# Patient Record
Sex: FEMALE | Race: WHITE | NOT HISPANIC OR LATINO | Employment: OTHER | URBAN - METROPOLITAN AREA
[De-identification: names, ages, dates, MRNs, and addresses within clinical notes are randomized per-mention and may not be internally consistent; named-entity substitution may affect disease eponyms.]

---

## 2017-03-03 ENCOUNTER — OFFICE VISIT (OUTPATIENT)
Dept: INTERNAL MEDICINE | Facility: CLINIC | Age: 62
End: 2017-03-03
Payer: COMMERCIAL

## 2017-03-03 VITALS
BODY MASS INDEX: 36.06 KG/M2 | OXYGEN SATURATION: 99 % | SYSTOLIC BLOOD PRESSURE: 131 MMHG | HEART RATE: 90 BPM | HEIGHT: 68 IN | DIASTOLIC BLOOD PRESSURE: 84 MMHG | RESPIRATION RATE: 16 BRPM | WEIGHT: 237.9 LBS | TEMPERATURE: 97.7 F

## 2017-03-03 DIAGNOSIS — I10 ESSENTIAL HYPERTENSION, BENIGN: ICD-10-CM

## 2017-03-03 PROCEDURE — 99213 OFFICE O/P EST LOW 20 MIN: CPT | Performed by: INTERNAL MEDICINE

## 2017-03-03 RX ORDER — LISINOPRIL AND HYDROCHLOROTHIAZIDE 12.5; 2 MG/1; MG/1
1 TABLET ORAL DAILY
Qty: 90 TABLET | Refills: 1 | Status: SHIPPED | OUTPATIENT
Start: 2017-03-03 | End: 2017-08-27

## 2017-03-03 NOTE — MR AVS SNAPSHOT
"              After Visit Summary   3/3/2017    Heather Bauer    MRN: [id number]           Patient Information     Date Of Birth          1955        Visit Information        Provider Department      3/3/2017 1:20 PM Sylvia Bird MD [facility]        Today's Diagnoses     Essential hypertension, benign           Follow-ups after your visit        Who to contact     If you have questions or need follow up information about today's clinic visit or your schedule please contact [facility] directly at [phone].  Normal or non-critical lab and imaging results will be communicated to you by MyChart, letter or phone within 4 business days after the clinic has received the results. If you do not hear from us within 7 days, please contact the clinic through Deemhart or phone. If you have a critical or abnormal lab result, we will notify you by phone as soon as possible.  Submit refill requests through Ziftit or call your pharmacy and they will forward the refill request to us. Please allow 3 business days for your refill to be completed.          Additional Information About Your Visit        MyChart Information     Ziftit gives you secure access to your electronic health record. If you see a primary care provider, you can also send messages to your care team and make appointments. If you have questions, please call your primary care clinic.  If you do not have a primary care provider, please call [phone] and they will assist you.        Care EveryWhere ID     This is your Care EveryWhere ID. This could be used by other organizations to access your [facility] medical records  [id number]        Your Vitals Were     Pulse Temperature Respirations Height Last Period Pulse Oximetry    90 97.7  F (36.5  C) (Oral) 16 5' 7.75\" (1.721 m) 07/07/2008 99%    BMI (Body Mass Index)                   36.44 kg/m2            Blood Pressure from Last 3 Encounters:   03/03/17 131/84 "   07/11/16 110/80   12/14/15 132/88    Weight from Last 3 Encounters:   03/03/17 237 lb 14.4 oz (107.9 kg)   07/11/16 236 lb (107 kg)   12/14/15 242 lb (109.8 kg)              Today, you had the following     No orders found for display         Where to get your medicines      These medications were sent to [facility] - [place], [place] - [address]  [address] [facility] [address]     Phone:  [phone]     lisinopril-hydrochlorothiazide 20-12.5 MG per tablet          Primary Care Provider Office Phone # Fax #    Sylvia Bird [phone]       [facility] [address] [facility] [address]  [facility] [address]        Thank you!     Thank you for choosing [facility]  for your care. Our goal is always to provide you with excellent care. Hearing back from our patients is one way we can continue to improve our services. Please take a few minutes to complete the written survey that you may receive in the mail after your visit with us. Thank you!             Your Updated Medication List - Protect others around you: Learn how to safely use, store and throw away your medicines at www.disposemymeds.org.          This list is accurate as of: 3/3/17  3:32 PM.  Always use your most recent med list.                   Brand Name Dispense Instructions for use    CALCIUM-VITAMIN D3 PO      Take 2 tablets by mouth as needed       lisinopril-hydrochlorothiazide 20-12.5 MG per tablet    PRINZIDE/ZESTORETIC    90 tablet    Take 1 tablet by mouth daily       Multiple Minerals-Vitamins Tabs

## 2017-03-03 NOTE — NURSING NOTE
"Chief Complaint   Patient presents with     Hypertension     LOV 07/11/2016       Initial /84  Pulse 90  Temp 97.7  F (36.5  C) (Oral)  Resp 16  Ht 5' 7.75\" (1.721 m)  Wt 237 lb 14.4 oz (107.9 kg)  LMP 07/07/2008  SpO2 99%  BMI 36.44 kg/m2 Estimated body mass index is 36.44 kg/(m^2) as calculated from the following:    Height as of this encounter: 5' 7.75\" (1.721 m).    Weight as of this encounter: 237 lb 14.4 oz (107.9 kg).  Medication Reconciliation: complete      Tete Lopez CMA    Pt received HCD and will return when complete.          "

## 2017-03-03 NOTE — PROGRESS NOTES
"  SUBJECTIVE:                                                    Heather Bauer is a 62 year old female who presents to clinic today for the following health issues:      Hypertension Follow-up      Outpatient blood pressures are not being checked.    Low Salt Diet: low salt       Amount of exercise or physical activity: None    Problems taking medications regularly: Yes,  problems remembering to take    Medication side effects: none  Diet: low salt             Current Concerns: none    Patient Active Problem List   Diagnosis     Essential hypertension, benign     CARDIOVASCULAR SCREENING; LDL GOAL LESS THAN 160     HCD (health care directive)     Pap smear of cervix with ASCUS, cannot exclude HGSIL       Current Outpatient Prescriptions   Medication Sig Dispense Refill     lisinopril-hydrochlorothiazide (PRINZIDE,ZESTORETIC) 20-12.5 MG per tablet Take 1 tablet by mouth daily 90 tablet 3     Calcium Carbonate-Vitamin D (CALCIUM-VITAMIN D3 PO) Take 2 tablets by mouth as needed        MULTIPLE MINERALS-VITAMINS OR TABS            Social History   Substance Use Topics     Smoking status: Former Smoker     Smokeless tobacco: Never Used      Comment: quit 20 years ago     Alcohol use Yes      Comment: very little        ROS:  No fever, chills, no dyspnea on exertion, no chest pain, palpitations, PND, orthopnea, edema, syncope, headache, abdominal pain     OBJECTIVE:                                                    /84  Pulse 90  Temp 97.7  F (36.5  C) (Oral)  Resp 16  Ht 5' 7.75\" (1.721 m)  Wt 237 lb 14.4 oz (107.9 kg)  LMP 07/07/2008  SpO2 99%  BMI 36.44 kg/m2  Body mass index is 36.44 kg/(m^2).      Lungs: clear  CV: normal S1, S2 without murmur, S3 or S4.   Abdomen: Bowel sounds normal, soft, nontender. No hepatosplenomegaly. No masses.   No edema  Pulses full, no carotid bruits       ASSESSMENT/PLAN:                                                            1. Essential hypertension, benign  well " controlled, continue med, follow up in 6 months with physical, labs then.   - lisinopril-hydrochlorothiazide (PRINZIDE/ZESTORETIC) 20-12.5 MG per tablet; Take 1 tablet by mouth daily  Dispense: 90 tablet; Refill: 1        Sylvia Bird MD  [facility]

## 2017-08-27 DIAGNOSIS — I10 ESSENTIAL HYPERTENSION, BENIGN: ICD-10-CM

## 2017-08-28 RX ORDER — LISINOPRIL AND HYDROCHLOROTHIAZIDE 12.5; 2 MG/1; MG/1
TABLET ORAL
Qty: 30 TABLET | Refills: 0 | Status: SHIPPED | OUTPATIENT
Start: 2017-08-28 | End: 2017-10-21

## 2017-08-28 NOTE — TELEPHONE ENCOUNTER
Lisinopril-Hydrochlorothiazide      Last Written Prescription Date: 03/03/17  Last Fill Quantity: 90, # refills: 1  Last Office Visit with G, P or [facility] prescribing provider: 03/03/17       Potassium   Date Value Ref Range Status   07/11/2016 3.9 3.4 - 5.3 mmol/L Final     Creatinine   Date Value Ref Range Status   07/11/2016 0.85 0.52 - 1.04 mg/dL Final     BP Readings from Last 3 Encounters:   03/03/17 131/84   07/11/16 110/80   12/14/15 132/88

## 2017-10-21 DIAGNOSIS — I10 ESSENTIAL HYPERTENSION, BENIGN: ICD-10-CM

## 2017-10-24 RX ORDER — LISINOPRIL AND HYDROCHLOROTHIAZIDE 12.5; 2 MG/1; MG/1
TABLET ORAL
Qty: 30 TABLET | Refills: 0 | Status: SHIPPED | OUTPATIENT
Start: 2017-10-24 | End: 2017-11-30

## 2017-10-24 NOTE — TELEPHONE ENCOUNTER
Pt was sent my chart message that her 11/3 appt is being cancelled, due to provider being out of the office

## 2017-11-30 ENCOUNTER — OFFICE VISIT (OUTPATIENT)
Dept: INTERNAL MEDICINE | Facility: CLINIC | Age: 62
End: 2017-11-30
Payer: COMMERCIAL

## 2017-11-30 VITALS
DIASTOLIC BLOOD PRESSURE: 82 MMHG | OXYGEN SATURATION: 99 % | RESPIRATION RATE: 16 BRPM | WEIGHT: 239.5 LBS | TEMPERATURE: 98.4 F | HEART RATE: 74 BPM | BODY MASS INDEX: 36.69 KG/M2 | SYSTOLIC BLOOD PRESSURE: 118 MMHG

## 2017-11-30 DIAGNOSIS — L30.9 ECZEMA, UNSPECIFIED TYPE: ICD-10-CM

## 2017-11-30 DIAGNOSIS — Z23 NEED FOR PROPHYLACTIC VACCINATION AND INOCULATION AGAINST INFLUENZA: ICD-10-CM

## 2017-11-30 DIAGNOSIS — Z13.6 CARDIOVASCULAR SCREENING; LDL GOAL LESS THAN 130: ICD-10-CM

## 2017-11-30 DIAGNOSIS — I10 ESSENTIAL HYPERTENSION, BENIGN: Primary | ICD-10-CM

## 2017-11-30 DIAGNOSIS — Z11.59 SCREENING FOR VIRAL DISEASE: ICD-10-CM

## 2017-11-30 PROCEDURE — 99214 OFFICE O/P EST MOD 30 MIN: CPT | Mod: 25 | Performed by: INTERNAL MEDICINE

## 2017-11-30 PROCEDURE — 90471 IMMUNIZATION ADMIN: CPT | Performed by: INTERNAL MEDICINE

## 2017-11-30 PROCEDURE — 90686 IIV4 VACC NO PRSV 0.5 ML IM: CPT | Performed by: INTERNAL MEDICINE

## 2017-11-30 PROCEDURE — 82043 UR ALBUMIN QUANTITATIVE: CPT | Performed by: INTERNAL MEDICINE

## 2017-11-30 RX ORDER — FLUOCINONIDE 0.5 MG/G
CREAM TOPICAL
Qty: 30 G | Refills: 1 | Status: SHIPPED | OUTPATIENT
Start: 2017-11-30 | End: 2018-04-10

## 2017-11-30 RX ORDER — LISINOPRIL AND HYDROCHLOROTHIAZIDE 12.5; 2 MG/1; MG/1
1 TABLET ORAL DAILY
Qty: 90 TABLET | Refills: 3 | Status: SHIPPED | OUTPATIENT
Start: 2017-11-30 | End: 2018-10-21

## 2017-11-30 NOTE — NURSING NOTE
"Chief Complaint   Patient presents with     Hypertension     LOV 03/03/2017:      Derm Problem     spot on left inner ankle- have noticed for a few years. Sometimes it can get sore.      Imm/Inj       Initial /82  Pulse 74  Temp 98.4  F (36.9  C) (Oral)  Resp 16  Wt 239 lb 8 oz (108.6 kg)  LMP 07/07/2008  SpO2 99%  BMI 36.69 kg/m2 Estimated body mass index is 36.69 kg/(m^2) as calculated from the following:    Height as of 3/3/17: 5' 7.75\" (1.721 m).    Weight as of this encounter: 239 lb 8 oz (108.6 kg).  Medication Reconciliation: complete      Tete Lopez CMA      "

## 2017-11-30 NOTE — MR AVS SNAPSHOT
After Visit Summary   11/30/2017    Heather Bauer    MRN: [id number]           Patient Information     Date Of Birth          1955        Visit Information        Provider Department      11/30/2017 5:00 PM Sylvia Bird MD [facility]        Today's Diagnoses     Essential hypertension, benign    -  1    Eczema, unspecified type        CARDIOVASCULAR SCREENING; LDL GOAL LESS THAN 130        Screening for viral disease        Need for prophylactic vaccination and inoculation against influenza           Follow-ups after your visit        Your next 10 appointments already scheduled     Dec 09, 2017  9:15 AM CST   LAB with [facility]   [facility]    [address]  [facility] [address]   [phone]           Please do not eat 10-12 hours before your appointment if you are coming in fasting for labs on lipids, cholesterol, or glucose (sugar). This does not apply to pregnant women. Water, hot tea and black coffee (with nothing added) are okay. Do not drink other fluids, diet soda or chew gum.              Who to contact     If you have questions or need follow up information about today's clinic visit or your schedule please contact [facility] directly at [phone].  Normal or non-critical lab and imaging results will be communicated to you by MyChart, letter or phone within 4 business days after the clinic has received the results. If you do not hear from us within 7 days, please contact the clinic through Funderahart or phone. If you have a critical or abnormal lab result, we will notify you by phone as soon as possible.  Submit refill requests through AwarenessHub or call your pharmacy and they will forward the refill request to us. Please allow 3 business days for your refill to be completed.          Additional Information About Your Visit        MyChart Information     AwarenessHub gives you secure access to your  electronic health record. If you see a primary care provider, you can also send messages to your care team and make appointments. If you have questions, please call your primary care clinic.  If you do not have a primary care provider, please call [phone] and they will assist you.        Care EveryWhere ID     This is your Care EveryWhere ID. This could be used by other organizations to access your [facility] medical records  [id number]        Your Vitals Were     Pulse Temperature Respirations Last Period Pulse Oximetry BMI (Body Mass Index)    74 98.4  F (36.9  C) (Oral) 16 07/07/2008 99% 36.69 kg/m2       Blood Pressure from Last 3 Encounters:   11/30/17 118/82   03/03/17 131/84   07/11/16 110/80    Weight from Last 3 Encounters:   11/30/17 239 lb 8 oz (108.6 kg)   03/03/17 237 lb 14.4 oz (107.9 kg)   07/11/16 236 lb (107 kg)              We Performed the Following     Albumin Random Urine Quantitative with Creat Ratio     FLU VAC, SPLIT VIRUS IM > 3 YO (QUADRIVALENT) [96591]     Vaccine Administration, Initial [43135]          Today's Medication Changes          These changes are accurate as of: 11/30/17 11:59 PM.  If you have any questions, ask your nurse or doctor.               Start taking these medicines.        Dose/Directions    fluocinonide 0.05 % cream   Commonly known as:  LIDEX   Used for:  Eczema, unspecified type   Started by:  Sylvia Bird MD        Apply sparingly to affected area daily   Quantity:  30 g   Refills:  1         These medicines have changed or have updated prescriptions.        Dose/Directions    lisinopril-hydrochlorothiazide 20-12.5 MG per tablet   Commonly known as:  PRINZIDE/ZESTORETIC   This may have changed:  See the new instructions.   Used for:  Essential hypertension, benign   Changed by:  Sylvia Bird MD        Dose:  1 tablet   Take 1 tablet by mouth daily   Quantity:  90 tablet   Refills:  3            Where to get your medicines      These medications were sent to  [facility] - [place], [place] - [address]  [address], [facility] [address]     Phone:  [phone]     fluocinonide 0.05 % cream    lisinopril-hydrochlorothiazide 20-12.5 MG per tablet                Primary Care Provider Office Phone # Fax #    Sylvia Bird [phone]       [address] [facility] [address]  [facility] [address]        Equal Access to Services     MARK KHAN : Hadii aad ku hadasho Soomaali, waaxda luqadaha, qaybta kaalmada adeegyada, waxay idiin hayaan adeeg kharash laemily . So [facility] [phone].    ATENCIÓN: Si habla español, tiene a torres disposición servicios gratuitos de asistencia lingüística. [facility] [phone].    We comply with applicable federal civil rights laws and [place] laws. We do not discriminate on the basis of race, color, national origin, age, disability, sex, sexual orientation, or gender identity.            Thank you!     Thank you for choosing [facility]  for your care. Our goal is always to provide you with excellent care. Hearing back from our patients is one way we can continue to improve our services. Please take a few minutes to complete the written survey that you may receive in the mail after your visit with us. Thank you!             Your Updated Medication List - Protect others around you: Learn how to safely use, store and throw away your medicines at www.disposemymeds.org.          This list is accurate as of: 11/30/17 11:59 PM.  Always use your most recent med list.                   Brand Name Dispense Instructions for use Diagnosis    CALCIUM-VITAMIN D3 PO      Take 2 tablets by mouth as needed        fluocinonide 0.05 % cream    LIDEX    30 g    Apply sparingly to affected area daily    Eczema, unspecified type       lisinopril-hydrochlorothiazide 20-12.5 MG per tablet    PRINZIDE/ZESTORETIC    90 tablet    Take 1 tablet by mouth daily    Essential hypertension, benign       Multiple Minerals-Vitamins Tabs

## 2017-11-30 NOTE — PROGRESS NOTES
SUBJECTIVE:   Heather Bauer is a 62 year old female who presents to clinic today for the following health issues:      Hypertension Follow-up      Outpatient blood pressures are not being checked.    Low Salt Diet: sometimes- no added salt         Amount of exercise or physical activity: None    Problems taking medications regularly: forgetting     Medication side effects: none    Diet: low salt      Current Concerns:   She has a skin rash at left lower leg medially. This has been present several years. It can seem to almost go away then worsens. It can be itchy, sometimes sore. She has used lotion, tried fungal cream in distant pas. She has had some lidex but did not try it on this area.       Patient Active Problem List   Diagnosis     Essential hypertension, benign     HCD (health care directive)     CARDIOVASCULAR SCREENING; LDL GOAL LESS THAN 130       Current Outpatient Prescriptions   Medication Sig Dispense Refill     lisinopril-hydrochlorothiazide (PRINZIDE/ZESTORETIC) 20-12.5 MG per tablet TAKE 1 TABLET BY MOUTH DAILY 30 tablet 0     Calcium Carbonate-Vitamin D (CALCIUM-VITAMIN D3 PO) Take 2 tablets by mouth as needed        MULTIPLE MINERALS-VITAMINS OR TABS            Social History   Substance Use Topics     Smoking status: Former Smoker     Smokeless tobacco: Never Used      Comment: quit 20 years ago     Alcohol use Yes      Comment: very little          Reviewed and updated as needed this visit by clinical staff     Reviewed and updated as needed this visit by Provider         ROS:  No fever, chills, no dyspnea on exertion, no chest pain, palpitations, PND, orthopnea, edema, syncope, headache, abdominal pain     OBJECTIVE:     /82  Pulse 74  Temp 98.4  F (36.9  C) (Oral)  Resp 16  Wt 239 lb 8 oz (108.6 kg)  LMP 07/07/2008  SpO2 99%  BMI 36.69 kg/m2  Body mass index is 36.69 kg/(m^2).    Lungs: clear  CV: normal S1, S2 without murmur, S3 or S4.   Abdomen: Bowel sounds normal, soft,  nontender. No hepatosplenomegaly. No masses.   No edema  Pulses full, no carotid bruits      Skin: 2 cm patch with raised edges, mild scaling on left lower leg      ASSESSMENT/PLAN:             1. Essential hypertension, benign  Well controlled, continue med, labs due  - Albumin Random Urine Quantitative with Creat Ratio; Future  - lisinopril-hydrochlorothiazide (PRINZIDE/ZESTORETIC) 20-12.5 MG per tablet; Take 1 tablet by mouth daily  Dispense: 90 tablet; Refill: 3  - Albumin Random Urine Quantitative with Creat Ratio  - Basic metabolic panel; Future    2. Eczema, unspecified type  Likely to be some eczematous dermatitis since it did not respond to antifungal. She can try the lidex on this patch, if worse then try lotrimin, consider derm in future.   - fluocinonide (LIDEX) 0.05 % cream; Apply sparingly to affected area daily  Dispense: 30 g; Refill: 1    3. CARDIOVASCULAR SCREENING; LDL GOAL LESS THAN 130  recheck  - Lipid panel reflex to direct LDL Fasting; Future    4. Screening for viral disease  Hep C    5. Need for prophylactic vaccination and inoculation against influenza    - FLU VAC, SPLIT VIRUS IM > 3 YO (QUADRIVALENT) [62623]  - Vaccine Administration, Initial [38078]        Sylvia Bird MD  [facility]    Injectable Influenza Immunization Documentation    1.  Is the person to be vaccinated sick today?   No    2. Does the person to be vaccinated have an allergy to a component   of the vaccine?   No  Egg Allergy Algorithm Link    3. Has the person to be vaccinated ever had a serious reaction   to influenza vaccine in the past?   No    4. Has the person to be vaccinated ever had Guillain-Barré syndrome?   No    Form completed by  Tete Lopez [facility]

## 2017-12-01 LAB
CREAT UR-MCNC: 154 MG/DL
MICROALBUMIN UR-MCNC: 11 MG/L
MICROALBUMIN/CREAT UR: 6.88 MG/G CR (ref 0–25)

## 2017-12-04 ENCOUNTER — TELEPHONE (OUTPATIENT)
Dept: INTERNAL MEDICINE | Facility: CLINIC | Age: 62
End: 2017-12-04

## 2017-12-04 NOTE — TELEPHONE ENCOUNTER
Left message on pt's mobile number to call back. Need to know if pt received flu vaccine at her office visit with Dr Bird on 11/30/2017?

## 2017-12-09 DIAGNOSIS — I10 ESSENTIAL HYPERTENSION, BENIGN: ICD-10-CM

## 2017-12-09 DIAGNOSIS — Z13.6 CARDIOVASCULAR SCREENING; LDL GOAL LESS THAN 130: ICD-10-CM

## 2017-12-09 DIAGNOSIS — Z11.59 SCREENING FOR VIRAL DISEASE: ICD-10-CM

## 2017-12-09 LAB
ANION GAP SERPL CALCULATED.3IONS-SCNC: 5 MMOL/L (ref 3–14)
BUN SERPL-MCNC: 21 MG/DL (ref 7–30)
CALCIUM SERPL-MCNC: 9.2 MG/DL (ref 8.5–10.1)
CHLORIDE SERPL-SCNC: 105 MMOL/L (ref 94–109)
CHOLEST SERPL-MCNC: 191 MG/DL
CO2 SERPL-SCNC: 29 MMOL/L (ref 20–32)
CREAT SERPL-MCNC: 0.92 MG/DL (ref 0.52–1.04)
GFR SERPL CREATININE-BSD FRML MDRD: 62 ML/MIN/1.7M2
GLUCOSE SERPL-MCNC: 79 MG/DL (ref 70–99)
HDLC SERPL-MCNC: 77 MG/DL
LDLC SERPL CALC-MCNC: 103 MG/DL
NONHDLC SERPL-MCNC: 114 MG/DL
POTASSIUM SERPL-SCNC: 4.4 MMOL/L (ref 3.4–5.3)
SODIUM SERPL-SCNC: 139 MMOL/L (ref 133–144)
TRIGL SERPL-MCNC: 57 MG/DL

## 2017-12-09 PROCEDURE — 36415 COLL VENOUS BLD VENIPUNCTURE: CPT | Performed by: INTERNAL MEDICINE

## 2017-12-09 PROCEDURE — 80061 LIPID PANEL: CPT | Performed by: INTERNAL MEDICINE

## 2017-12-09 PROCEDURE — 86803 HEPATITIS C AB TEST: CPT | Performed by: INTERNAL MEDICINE

## 2017-12-09 PROCEDURE — 80048 BASIC METABOLIC PNL TOTAL CA: CPT | Performed by: INTERNAL MEDICINE

## 2017-12-11 LAB — HCV AB SERPL QL IA: NONREACTIVE

## 2018-04-10 ENCOUNTER — DOCUMENTATION ONLY (OUTPATIENT)
Dept: LAB | Facility: CLINIC | Age: 63
End: 2018-04-10

## 2018-04-10 ENCOUNTER — OFFICE VISIT (OUTPATIENT)
Dept: INTERNAL MEDICINE | Facility: CLINIC | Age: 63
End: 2018-04-10
Payer: COMMERCIAL

## 2018-04-10 VITALS
OXYGEN SATURATION: 100 % | RESPIRATION RATE: 16 BRPM | HEART RATE: 90 BPM | DIASTOLIC BLOOD PRESSURE: 62 MMHG | HEIGHT: 68 IN | SYSTOLIC BLOOD PRESSURE: 118 MMHG | TEMPERATURE: 98.1 F | BODY MASS INDEX: 35.89 KG/M2 | WEIGHT: 236.8 LBS

## 2018-04-10 DIAGNOSIS — I10 ESSENTIAL HYPERTENSION, BENIGN: ICD-10-CM

## 2018-04-10 DIAGNOSIS — N93.9 VAGINAL BLEEDING: ICD-10-CM

## 2018-04-10 DIAGNOSIS — E66.01 MORBID OBESITY (H): ICD-10-CM

## 2018-04-10 DIAGNOSIS — Z00.00 ENCOUNTER FOR ROUTINE ADULT HEALTH EXAMINATION WITHOUT ABNORMAL FINDINGS: Primary | ICD-10-CM

## 2018-04-10 PROCEDURE — 99396 PREV VISIT EST AGE 40-64: CPT | Performed by: INTERNAL MEDICINE

## 2018-04-10 NOTE — MR AVS SNAPSHOT
After Visit Summary   4/10/2018    Heather Bauer    MRN: [id number]           Patient Information     Date Of Birth          1955        Visit Information        Provider Department      4/10/2018 4:00 PM Sylvia Bird MD [facility]        Care Instructions      Preventive Health Recommendations  Female Ages 50 - 64    Yearly exam: See your health care provider every year in order to  o Review health changes.   o Discuss preventive care.    o Review your medicines if your doctor has prescribed any.      Get a Pap test every three years (unless you have an abnormal result and your provider advises testing more often).    If you get Pap tests with HPV test, you only need to test every 5 years, unless you have an abnormal result.     You do not need a Pap test if your uterus was removed (hysterectomy) and you have not had cancer.    You should be tested each year for STDs (sexually transmitted diseases) if you're at risk.     Have a mammogram every 1 to 2 years.    Have a colonoscopy at age 50, or have a yearly FIT test (stool test). These exams screen for colon cancer.      Have a cholesterol test every 5 years, or more often if advised.    Have a diabetes test (fasting glucose) every three years. If you are at risk for diabetes, you should have this test more often.     If you are at risk for osteoporosis (brittle bone disease), think about having a bone density scan (DEXA).    Shots: Get a flu shot each year. Get a tetanus shot every 10 years.    Nutrition:     Eat at least 5 servings of fruits and vegetables each day.    Eat whole-grain bread, whole-wheat pasta and brown rice instead of white grains and rice.    Talk to your provider about Calcium and Vitamin D.     Lifestyle    Exercise at least 150 minutes a week (30 minutes a day, 5 days a week). This will help you control your weight and prevent disease.    Limit alcohol to one drink per day.    No smoking.     Wear  sunscreen to prevent skin cancer.     See your dentist every six months for an exam and cleaning.    See your eye doctor every 1 to 2 years.            Follow-ups after your visit        Your next 10 appointments already scheduled     Apr 21, 2018  9:45 AM CDT   LAB with [facility]   [facility]    [address]  [facility] [address]   [phone]           Please do not eat 10-12 hours before your appointment if you are coming in fasting for labs on lipids, cholesterol, or glucose (sugar). This does not apply to pregnant women. Water, hot tea and black coffee (with nothing added) are okay. Do not drink other fluids, diet soda or chew gum.              Who to contact     If you have questions or need follow up information about today's clinic visit or your schedule please contact [facility] directly at [phone].  Normal or non-critical lab and imaging results will be communicated to you by [employer], letter or phone within 4 business days after the clinic has received the results. If you do not hear from us within 7 days, please contact the clinic through [employer] or phone. If you have a critical or abnormal lab result, we will notify you by phone as soon as possible.  Submit refill requests through [employer] or call your pharmacy and they will forward the refill request to us. Please allow 3 business days for your refill to be completed.          Additional Information About Your Visit        [employer] Information     [employer] gives you secure access to your electronic health record. If you see a primary care provider, you can also send messages to your care team and make appointments. If you have questions, please call your primary care clinic.  If you do not have a primary care provider, please call [phone] and they will assist you.        Care EveryWhere ID     This is your Care EveryWhere ID. This could be used by other organizations to  "access your [facility] medical records  [id number]        Your Vitals Were     Pulse Temperature Respirations Height Last Period Pulse Oximetry    90 98.1  F (36.7  C) (Oral) 16 5' 7.5\" (1.715 m) 07/07/2008 100%    BMI (Body Mass Index)                   36.54 kg/m2            Blood Pressure from Last 3 Encounters:   04/10/18 118/62   11/30/17 118/82   03/03/17 131/84    Weight from Last 3 Encounters:   04/10/18 236 lb 12.8 oz (107.4 kg)   11/30/17 239 lb 8 oz (108.6 kg)   03/03/17 237 lb 14.4 oz (107.9 kg)              Today, you had the following     No orders found for display         Today's Medication Changes          These changes are accurate as of 4/10/18  4:43 PM.  If you have any questions, ask your nurse or doctor.               Stop taking these medicines if you haven't already. Please contact your care team if you have questions.     CALCIUM-VITAMIN D3 PO   Stopped by:  Sylvia Bird MD                    Primary Care Provider Office Phone # Fax #    Sylvia Bird [phone]       [address] [facility] [address]  [facility] [address]        Equal Access to Services     [facility] : Hadii liliana huertao Soraeann, waaxda luqadaha, qaybta kaalmada adeegyada, taisha max . So [facility] [phone].    ATENCIÓN: Si habla español, tiene a torres disposición servicios gratuitos de asistencia lingüística. Llame al [phone].    We comply with applicable federal civil rights laws and [place] laws. We do not discriminate on the basis of race, color, national origin, age, disability, sex, sexual orientation, or gender identity.            Thank you!     Thank you for choosing [facility]  for your care. Our goal is always to provide you with excellent care. Hearing back from our patients is one way we can continue to improve our services. Please take a few minutes to complete the written survey that you may receive in the mail after your visit with us. Thank " you!             Your Updated Medication List - Protect others around you: Learn how to safely use, store and throw away your medicines at www.disposemymeds.org.          This list is accurate as of 4/10/18  4:43 PM.  Always use your most recent med list.                   Brand Name Dispense Instructions for use Diagnosis    lisinopril-hydrochlorothiazide 20-12.5 MG per tablet    PRINZIDE/ZESTORETIC    90 tablet    Take 1 tablet by mouth daily    Essential hypertension, benign       Multiple Minerals-Vitamins Tabs

## 2018-04-10 NOTE — PROGRESS NOTES
SUBJECTIVE:   CC: Heather Bauer is an 63 year old woman who presents for preventive health visit.     Healthy Habits:    Do you get at least three servings of calcium containing foods daily (dairy, green leafy vegetables, etc.)? yes    Amount of exercise or daily activities, outside of work: no    Problems taking medications regularly No    Medication side effects: No    Have you had an eye exam in the past two years? no    Do you see a dentist twice per year? yes    Do you have sleep apnea, excessive snoring or daytime drowsiness?no      Current concerns:   She has had some blood spotting. She thinks it is vaginal, not rectal. She has had small  Spots of bright red blood on paper when wiping. This happened for for 5-7 days, last was about a week ago. She had a little burning sensation with the wiping. No dark blood or clots, no discharge or itching, no urinary symptoms, no recent intercourse.     Today's PHQ-2 Score:   PHQ-2 ( 1999 [employer]) 4/10/2018 11/30/2017   Q1: Little interest or pleasure in doing things 0 0   Q2: Feeling down, depressed or hopeless 0 0   PHQ-2 Score 0 0       Abuse: Current or Past(Physical, Sexual or Emotional)- No  Do you feel safe in your environment - Yes    Social History   Substance Use Topics     Smoking status: Former Smoker     Smokeless tobacco: Never Used      Comment: quit 20 years ago     Alcohol use Yes      Comment: very little     If you drink alcohol do you typically have >3 drinks per day or >7 drinks per week? No                     Reviewed orders with patient.  Reviewed health maintenance and updated orders accordingly - Yes      Patient over age 50, mutual decision to screen reflected in health maintenance.    Pertinent mammograms are reviewed under the imaging tab.  History of abnormal Pap smear: NO - age 30-65 PAP every 5 years with negative HPV co-testing recommended    Reviewed and updated as needed this visit by clinical staff  Tobacco  Allergies  Meds   "Med Hx  Surg Hx  Fam Hx  Soc Hx        Reviewed and updated as needed this visit by Provider          Patient Active Problem List   Diagnosis     Essential hypertension, benign     HCD (health care directive)     CARDIOVASCULAR SCREENING; LDL GOAL LESS THAN 130     Morbid obesity (H)     Current Outpatient Prescriptions   Medication Sig Dispense Refill     lisinopril-hydrochlorothiazide (PRINZIDE/ZESTORETIC) 20-12.5 MG per tablet Take 1 tablet by mouth daily 90 tablet 3     MULTIPLE MINERALS-VITAMINS OR TABS         Past Surgical History:   Procedure Laterality Date     C APPENDECTOMY        Review Of Systems  Skin: negative  Eyes: negative  Ears/Nose/Throat: negative  Respiratory: No shortness of breath, dyspnea on exertion, cough,   Cardiovascular: negative  Gastrointestinal: negative  Genitourinary: negative  Musculoskeletal: negative  Neurologic: negative  Psychiatric: negative  Hematologic/Lymphatic/Immunologic: negative  Endocrine: negative   Gynecologic ros reveals:no breast pain or new or enlarging lumps on self exam, bleeding, burning as above    Objective:  Patient alert, in no acute distress  /62  Pulse 90  Temp 98.1  F (36.7  C) (Oral)  Resp 16  Ht 5' 7.5\" (1.715 m)  Wt 236 lb 12.8 oz (107.4 kg)  LMP 07/07/2008  SpO2 100%  BMI 36.54 kg/m2    HEENT: extraocular movements are intact, pupils equal and reactive to light and accommodation, TMs clear, oropharynx clear  NECK: Neck supple. No adenopathy. Thyroid symmetric, normal size,, Carotids without bruits.  PULMONARY: clear to auscultation  CARDIAC: regular rate and rhythm and no murmurs, clicks, or gallops  PULSES: 2/2 throughout  BACK: no spinal or CVAT  ABDOMINAL: Soft, nontender.  Normal bowel sounds.  No hepatosplenomegaly or abnormal masses  BREAST: No breast masses or tenderness, No axillary masses or tenderness and No galactorrhea  PELVIC: External genitalia: mild atrophy, no lesions, no vaginal lesions or blood, bimanual exam " "unremarkable  REFLEXES: 2+ throughout  SKIN: unremarkable           ASSESSMENT/PLAN:   1. Encounter for routine adult health examination without abnormal findings  Up to date    2. Morbid obesity (H)  Weight down a little, work on exercise, diet    3. Vaginal bleeding  Reassured not likely due to uterine issues since it is small spotting and bright red blood, resolved now. May be related to some atrophy, no other skin issues.   Care with wiping, consider estrogen cream if more irritation, if darker blood, heavier bleeding refer for US.    4. Essential hypertension, benign  Controlled, continue med      COUNSELING:   Reviewed preventive health counseling, as reflected in patient instructions         reports that she has quit smoking. She has never used smokeless tobacco.    Estimated body mass index is 36.54 kg/(m^2) as calculated from the following:    Height as of this encounter: 5' 7.5\" (1.715 m).    Weight as of this encounter: 236 lb 12.8 oz (107.4 kg).   Weight management plan: Discussed healthy diet and exercise guidelines and patient will follow up in 12 months in clinic to re-evaluate.    Counseling Resources:  ATP IV Guidelines  Pooled Cohorts Equation Calculator  Breast Cancer Risk Calculator  FRAX Risk Assessment  ICSI Preventive Guidelines  Dietary Guidelines for Americans, 2010  USDA's MyPlate  ASA Prophylaxis  Lung CA Screening    Sylvia Bird MD  [facility]  "

## 2018-04-10 NOTE — NURSING NOTE
"Chief Complaint   Patient presents with     Physical     c/o vaginal blood spotting. Notice burning sensation 1 week ago.        Initial /62  Pulse 90  Temp 98.1  F (36.7  C) (Oral)  Resp 16  Ht 5' 7.5\" (1.715 m)  Wt 236 lb 12.8 oz (107.4 kg)  LMP 07/07/2008  SpO2 100%  BMI 36.54 kg/m2 Estimated body mass index is 36.54 kg/(m^2) as calculated from the following:    Height as of this encounter: 5' 7.5\" (1.715 m).    Weight as of this encounter: 236 lb 12.8 oz (107.4 kg).  Medication Reconciliation: complete      Tete Lopez CMA      "

## 2018-04-10 NOTE — PROGRESS NOTES
Please cancel this appointment. She does not need lab now. We discussed this at her appointment today. It was scheduled before I saw her.

## 2018-04-14 PROBLEM — E66.01 MORBID OBESITY (H): Status: ACTIVE | Noted: 2018-04-14

## 2018-10-21 DIAGNOSIS — I10 ESSENTIAL HYPERTENSION, BENIGN: ICD-10-CM

## 2018-10-22 RX ORDER — LISINOPRIL AND HYDROCHLOROTHIAZIDE 12.5; 2 MG/1; MG/1
TABLET ORAL
Qty: 90 TABLET | Refills: 0 | Status: SHIPPED | OUTPATIENT
Start: 2018-10-22 | End: 2019-01-18

## 2018-10-22 NOTE — TELEPHONE ENCOUNTER
"Requested Prescriptions   Pending Prescriptions Disp Refills     lisinopril-hydrochlorothiazide (PRINZIDE/ZESTORETIC) 20-12.5 MG per tablet [Pharmacy Med Name: LISINOPRIL-HCTZ 20-12.5 MG TAB] 90 tablet 2    Last Written Prescription Date:  11/30/2017  Last Fill Quantity: 90,  # refills: 3   Last office visit: 4/10/2018 with prescribing provider:     Future Office Visit:   Sig: TAKE 1 TABLET BY MOUTH DAILY    Diuretics (Including Combos) Protocol Passed    10/21/2018  4:13 AM       Passed - Blood pressure under 140/90 in past 12 months    BP Readings from Last 3 Encounters:   04/10/18 118/62   11/30/17 118/82   03/03/17 131/84                Passed - Recent (12 mo) or future (30 days) visit within the authorizing provider's specialty    Patient had office visit in the last 12 months or has a visit in the next 30 days with authorizing provider or within the authorizing provider's specialty.  See \"Patient Info\" tab in inbasket, or \"Choose Columns\" in Meds & Orders section of the refill encounter.             Passed - Patient is age 18 or older       Passed - No active pregancy on record       Passed - Normal serum creatinine on file in past 12 months    Recent Labs   Lab Test  12/09/17 0915   CR  0.92             Passed - Normal serum potassium on file in past 12 months    Recent Labs   Lab Test  12/09/17 0915   POTASSIUM  4.4                   Passed - Normal serum sodium on file in past 12 months    Recent Labs   Lab Test  12/09/17 0915   NA  139             Passed - No positive pregnancy test in past 12 months        "

## 2018-10-22 NOTE — TELEPHONE ENCOUNTER
Medication is being filled for 1 time refill only due to:  Patient needs labs --creat, K+, and NA in Dec 2018.  Future order in chart.    Zoom[employer] message sent.

## 2018-11-30 ENCOUNTER — TELEPHONE (OUTPATIENT)
Dept: INTERNAL MEDICINE | Facility: CLINIC | Age: 63
End: 2018-11-30

## 2018-11-30 NOTE — TELEPHONE ENCOUNTER
Panel Management Review      Patient has the following on her problem list:     Hypertension   Last three blood pressure readings:  BP Readings from Last 3 Encounters:   04/10/18 118/62   11/30/17 118/82   03/03/17 131/84     Blood pressure: Passed    HTN Guidelines:  Age 18-59 BP range:  Less than 140/90  Age 60-85 with Diabetes:  Less than 140/90  Age 60-85 without Diabetes:  less than 150/90      Composite cancer screening  Chart review shows that this patient is due/due soon for the following Mammogram  Summary:    Patient is due/failing the following:   MAMMOGRAM    Action needed:   Patient needs referral/order: schedule mammogram    Type of outreach:    Sent letter.    Questions for provider review:    None                                                                                                                                     Tete Lopez CMA       Chart routed to Care Team .

## 2018-11-30 NOTE — LETTER
[facility]  [address], [address]  [place], [place]  [address]                                            TEL:[phone]  FAX:[phone]      Heather Bauer  [address] [facility] [address]      November 30, 2018    Dear Heather,    In order to ensure we are providing the best quality care, we have reviewed your chart and see that you are due for:     1. Mammogram    Please call the Mammogram Breast center : [phone] at your earliest convenience to schedule an appointment.     Thank you for trusting us with your health care.              Sincerely,      Dr Bird

## 2018-11-30 NOTE — LETTER
[facility]  [address], [address]  [place], [place]  [address]                                            TEL:[phone]  FAX:[phone]      Heather Bauer  [address] [address] [facility] [address]      December 27, 2018    Dear Heather,    We sent you a letter a couple of weeks ago informing you of what you are due for, we hope that you did receive it. Your health is extremely important to us. Please take the time to consider calling the clinic to discuss your preventative health measures.?   Thank you for allowing us to help you take care of your health!       Sincerely,      Sylvia Bird M.D.                  ?

## 2019-01-18 DIAGNOSIS — I10 ESSENTIAL HYPERTENSION, BENIGN: ICD-10-CM

## 2019-01-18 NOTE — TELEPHONE ENCOUNTER
"Requested Prescriptions   Pending Prescriptions Disp Refills     lisinopril-hydrochlorothiazide (PRINZIDE/ZESTORETIC) 20-12.5 MG tablet [Pharmacy Med Name: LISINOPRIL-HCTZ 20-12.5 MG TAB] 90 tablet 0    Last Written Prescription Date:  10/22/2018  Last Fill Quantity: 90,  # refills: 0   Last office visit: 4/10/2018 with prescribing provider:     Future Office Visit:   Sig: TAKE 1 TABLET BY MOUTH EVERY DAY    Diuretics (Including Combos) Protocol Failed - 1/18/2019  1:37 AM       Failed - Normal serum creatinine on file in past 12 months    Recent Labs   Lab Test 12/09/17  0915   CR 0.92             Failed - Normal serum potassium on file in past 12 months    Recent Labs   Lab Test 12/09/17  0915   POTASSIUM 4.4                   Failed - Normal serum sodium on file in past 12 months    Recent Labs   Lab Test 12/09/17  0915                Passed - Blood pressure under 140/90 in past 12 months    BP Readings from Last 3 Encounters:   04/10/18 118/62   11/30/17 118/82   03/03/17 131/84                Passed - Recent (12 mo) or future (30 days) visit within the authorizing provider's specialty    Patient had office visit in the last 12 months or has a visit in the next 30 days with authorizing provider or within the authorizing provider's specialty.  See \"Patient Info\" tab in inbasket, or \"Choose Columns\" in Meds & Orders section of the refill encounter.             Passed - Medication is active on med list       Passed - Patient is age 18 or older       Passed - No active pregancy on record       Passed - No positive pregnancy test in past 12 months        "

## 2019-01-22 RX ORDER — LISINOPRIL AND HYDROCHLOROTHIAZIDE 12.5; 2 MG/1; MG/1
TABLET ORAL
Qty: 90 TABLET | Refills: 0 | Status: SHIPPED | OUTPATIENT
Start: 2019-01-22 | End: 2019-02-01

## 2019-02-01 ENCOUNTER — OFFICE VISIT (OUTPATIENT)
Dept: INTERNAL MEDICINE | Facility: CLINIC | Age: 64
End: 2019-02-01
Payer: COMMERCIAL

## 2019-02-01 ENCOUNTER — HOSPITAL ENCOUNTER (OUTPATIENT)
Dept: MAMMOGRAPHY | Facility: CLINIC | Age: 64
Discharge: HOME | End: 2019-02-01
Attending: INTERNAL MEDICINE | Admitting: INTERNAL MEDICINE
Payer: COMMERCIAL

## 2019-02-01 VITALS
WEIGHT: 242.1 LBS | HEIGHT: 68 IN | SYSTOLIC BLOOD PRESSURE: 108 MMHG | TEMPERATURE: 98.2 F | OXYGEN SATURATION: 97 % | BODY MASS INDEX: 36.69 KG/M2 | DIASTOLIC BLOOD PRESSURE: 82 MMHG | RESPIRATION RATE: 16 BRPM | HEART RATE: 93 BPM

## 2019-02-01 DIAGNOSIS — R21 RASH: ICD-10-CM

## 2019-02-01 DIAGNOSIS — I10 ESSENTIAL HYPERTENSION, BENIGN: Primary | ICD-10-CM

## 2019-02-01 DIAGNOSIS — Z13.6 CARDIOVASCULAR SCREENING; LDL GOAL LESS THAN 130: ICD-10-CM

## 2019-02-01 DIAGNOSIS — E66.01 MORBID OBESITY (H): ICD-10-CM

## 2019-02-01 DIAGNOSIS — Z12.31 VISIT FOR SCREENING MAMMOGRAM: ICD-10-CM

## 2019-02-01 DIAGNOSIS — Z11.59 SCREENING FOR VIRAL DISEASE: ICD-10-CM

## 2019-02-01 DIAGNOSIS — I35.0 AORTIC VALVE STENOSIS, ETIOLOGY OF CARDIAC VALVE DISEASE UNSPECIFIED: ICD-10-CM

## 2019-02-01 DIAGNOSIS — Z23 NEED FOR PROPHYLACTIC VACCINATION AND INOCULATION AGAINST INFLUENZA: ICD-10-CM

## 2019-02-01 DIAGNOSIS — I35.1 AORTIC VALVE INSUFFICIENCY, ETIOLOGY OF CARDIAC VALVE DISEASE UNSPECIFIED: ICD-10-CM

## 2019-02-01 PROCEDURE — 82043 UR ALBUMIN QUANTITATIVE: CPT | Performed by: INTERNAL MEDICINE

## 2019-02-01 PROCEDURE — 77063 BREAST TOMOSYNTHESIS BI: CPT

## 2019-02-01 PROCEDURE — 90688 IIV4 VACCINE SPLT 0.5 ML IM: CPT | Performed by: INTERNAL MEDICINE

## 2019-02-01 PROCEDURE — 87389 HIV-1 AG W/HIV-1&-2 AB AG IA: CPT | Performed by: INTERNAL MEDICINE

## 2019-02-01 PROCEDURE — 36415 COLL VENOUS BLD VENIPUNCTURE: CPT | Performed by: INTERNAL MEDICINE

## 2019-02-01 PROCEDURE — 80048 BASIC METABOLIC PNL TOTAL CA: CPT | Performed by: INTERNAL MEDICINE

## 2019-02-01 PROCEDURE — 99214 OFFICE O/P EST MOD 30 MIN: CPT | Mod: 25 | Performed by: INTERNAL MEDICINE

## 2019-02-01 PROCEDURE — 80061 LIPID PANEL: CPT | Performed by: INTERNAL MEDICINE

## 2019-02-01 PROCEDURE — 90471 IMMUNIZATION ADMIN: CPT | Performed by: INTERNAL MEDICINE

## 2019-02-01 RX ORDER — LISINOPRIL AND HYDROCHLOROTHIAZIDE 12.5; 2 MG/1; MG/1
1 TABLET ORAL DAILY
Qty: 90 TABLET | Refills: 3 | Status: SHIPPED | OUTPATIENT
Start: 2019-02-01 | End: 2020-01-16

## 2019-02-01 ASSESSMENT — MIFFLIN-ST. JEOR: SCORE: 1711.66

## 2019-02-01 NOTE — LETTER
February 5, 2019      Heather Bauer  [address] [address] [address] [facility] [address]        Dear ,    The HIV test is negative.  Your kidney tests, creatinine, GFR and urine albumin, are normal. The sugar is normal. The LDL, bad cholesterol, is at your goal of < 130.  Continue medication and recheck in a year.     Please call clinic if you have any questions.     Resulted Orders   Basic metabolic panel  (Ca, Cl, CO2, Creat, Gluc, K, Na, BUN)   Result Value Ref Range    Sodium 138 133 - 144 mmol/L    Potassium 3.8 3.4 - 5.3 mmol/L    Chloride 104 94 - 109 mmol/L    Carbon Dioxide 30 20 - 32 mmol/L    Anion Gap 4 3 - 14 mmol/L    Glucose 97 70 - 99 mg/dL      Comment:      Fasting specimen    Urea Nitrogen 21 7 - 30 mg/dL    Creatinine 0.90 0.52 - 1.04 mg/dL    GFR Estimate 69 >60 mL/min/[1.73_m2]      Comment:      Non  GFR Calc  Starting 12/18/2018, serum creatinine based estimated GFR (eGFR) will be   calculated using the Chronic Kidney Disease Epidemiology Collaboration   (CKD-EPI) equation.      GFR Estimate If Black 79 >60 mL/min/[1.73_m2]      Comment:       GFR Calc  Starting 12/18/2018, serum creatinine based estimated GFR (eGFR) will be   calculated using the Chronic Kidney Disease Epidemiology Collaboration   (CKD-EPI) equation.      Calcium 9.3 8.5 - 10.1 mg/dL   Albumin Random Urine Quantitative with Creat Ratio   Result Value Ref Range    Creatinine Urine 82 mg/dL    Albumin Urine mg/L <5 mg/L    Albumin Urine mg/g Cr Unable to calculate due to low value 0 - 25 mg/g Cr   HIV Antigen Antibody Combo   Result Value Ref Range    HIV Antigen Antibody Combo Nonreactive NR^Nonreactive          Comment:      HIV-1 p24 Ag & HIV-1/HIV-2 Ab Not Detected   Lipid panel reflex to direct LDL Fasting   Result Value Ref Range    Cholesterol 191 <200 mg/dL    Triglycerides 76 <150 mg/dL      Comment:      Fasting specimen    HDL Cholesterol 71 >49 mg/dL    LDL Cholesterol Calculated  105 (H) <100 mg/dL      Comment:      Above desirable:  100-129 mg/dl  Borderline High:  130-159 mg/dL  High:             160-189 mg/dL  Very high:       >189 mg/dl      Non HDL Cholesterol 120 <130 mg/dL       If you have any questions or concerns, please call the clinic at the number listed above.       Sincerely,        Sylvia Bird MD

## 2019-02-01 NOTE — PROGRESS NOTES
"  SUBJECTIVE:   Heather Bauer is a 63 year old female who presents to clinic today for the following health issues:      Hypertension Follow-up      Outpatient blood pressures are not being checked.    Low Salt Diet: no added salt        Do you get at least three servings of calcium containing foods daily (dairy, green leafy vegetables, etc.)? No    Amount of exercise or daily activities, outside of work: No    Problems taking medications regularly No    Medication side effects: No      Other problems:   1.  Morbid obesity: Weight is stable      Current Concerns:   She has had a brownish patch on her left lower leg near the medial ankle for some time.  She has tried some cream and it did not really help.  It is itchy or sore.    Patient Active Problem List   Diagnosis     Essential hypertension, benign     HCD (health care directive)     CARDIOVASCULAR SCREENING; LDL GOAL LESS THAN 130     Morbid obesity (H)       Current Outpatient Medications   Medication Sig Dispense Refill     lisinopril-hydrochlorothiazide (PRINZIDE/ZESTORETIC) 20-12.5 MG tablet Take 1 tablet by mouth daily 90 tablet 3     MULTIPLE MINERALS-VITAMINS OR TABS            Social History     Tobacco Use     Smoking status: Former Smoker     Smokeless tobacco: Never Used     Tobacco comment: quit 20 years ago   Substance Use Topics     Alcohol use: Yes     Comment: very little        Reviewed and updated as needed this visit by clinical staff  Tobacco  Allergies  Meds  Problems  Med Hx  Surg Hx  Fam Hx  Soc Hx        Reviewed and updated as needed this visit by Provider  Tobacco  Allergies  Meds  Problems  Med Hx  Surg Hx  Fam Hx         ROS:  No fever, chills, no dyspnea on exertion, no chest pain, palpitations, PND, orthopnea, edema, syncope, headache, abdominal pain     OBJECTIVE:     /82   Pulse 93   Temp 98.2  F (36.8  C) (Oral)   Resp 16   Ht 1.727 m (5' 8\")   Wt 109.8 kg (242 lb 1.6 oz)   LMP 07/07/2008   SpO2 97% "   BMI 36.81 kg/m    Body mass index is 36.81 kg/m .    Lungs: clear  CV: normal S1, S2 with 2/6 ZAYDA murmur, S3 or S4.   No edema, left lower leg with a patch approximately 15 cm long, 3 cm wide of slightly brownish discoloration, slightly firm skin.  Pulses full, no carotid bruits        ASSESSMENT/PLAN:         1. Essential hypertension, benign  Well-controlled, continue medication, check labs  - lisinopril-hydrochlorothiazide (PRINZIDE/ZESTORETIC) 20-12.5 MG tablet; Take 1 tablet by mouth daily  Dispense: 90 tablet; Refill: 3  - Basic metabolic panel  (Ca, Cl, CO2, Creat, Gluc, K, Na, BUN)  - Albumin Random Urine Quantitative with Creat Ratio    2. Morbid obesity (H)  Weight is stable, work on diet and exercise    3. Aortic valve stenosis, etiology of cardiac valve disease unspecified  Exam reveals a murmur that either was not there before was very very soft.  Previous echo had shown some valvular changes, recommend an echo to reassess the valve  - Echocardiogram Complete; Future    4. Aortic valve insufficiency, etiology of cardiac valve disease unspecified    - Echocardiogram Complete; Future    5. CARDIOVASCULAR SCREENING; LDL GOAL LESS THAN 130  Recommend check lipid  - Lipid panel reflex to direct LDL Fasting    6. Need for prophylactic vaccination and inoculation against influenza    - FLU VACCINE, IM (QUADRIVALENT W/PRESERVATIVES/MULTI-DOSE) [31304]- >3 YRS  - Vaccine Administration, Initial [42961]    7. Screening for viral disease    - HIV Antigen Antibody Combo    8 Rash: Reassured this is benign, likely related to some venous insufficiency.  It does get itchy try hydrocortisone cream    Sylvia Bird MD  [facility]  .      [facility]ation    1.  Is the person to be vaccinated sick today?   No    2. Does the person to be vaccinated have an allergy to a component   of the vaccine?   No  Egg Allergy Algorithm Link    3. Has the  person to be vaccinated ever had a serious reaction   to influenza vaccine in the past?   No    4. Has the person to be vaccinated ever had Guillain-Barré syndrome?   No    Form completed by  Tete Lopez [facility]

## 2019-02-01 NOTE — NURSING NOTE
"/82   Pulse 93   Temp 98.2  F (36.8  C) (Oral)   Resp 16   Ht 1.727 m (5' 8\")   Wt 109.8 kg (242 lb 1.6 oz)   LMP 07/07/2008   SpO2 97%   BMI 36.81 kg/m        Completed mamogram this morning. Will get flu vaccine.     Screening Questionnaire for Adult Immunization    Are you sick today?   No   Do you have allergies to medications, food, a vaccine component or latex?   No   Have you ever had a serious reaction after receiving a vaccination?   No   Do you have a long-term health problem with heart disease, lung disease, asthma, kidney disease, metabolic disease (e.g. diabetes), anemia, or other blood disorder?   No   Do you have cancer, leukemia, HIV/AIDS, or any other immune system problem?   No   In the past 3 months, have you taken medications that affect  your immune system, such as prednisone, other steroids, or anticancer drugs; drugs for the treatment of rheumatoid arthritis, Crohn s disease, or psoriasis; or have you had radiation treatments?   No   Have you had a seizure, or a brain or other nervous system problem?   No   During the past year, have you received a transfusion of blood or blood     products, or been given immune (gamma) globulin or antiviral drug?   No   For women: Are you pregnant or is there a chance you could become        pregnant during the next month?   No   Have you received any vaccinations in the past 4 weeks?   No     Immunization questionnaire answers were all negative.        Per orders of Dr. Bird, injection of Flu Vaccine given by Debo Lopez. Patient instructed to remain in clinic for 15 minutes afterwards, and to report any adverse reaction to me immediately.       Screening performed by Debo Loepz on 2/1/2019 at 10:25 AM.    "

## 2019-02-01 NOTE — PROGRESS NOTES
"  SUBJECTIVE:   Heather Bauer is a 63 year old female who presents to clinic today for the following health issues:      Hypertension Follow-up      Outpatient blood pressures {ISCHECKIN}    Low Salt Diet: { :557528::\"no added salt\"}      Amount of exercise or physical activity: {Exercise frequency days per week:665806}    Problems taking medications regularly: {Med Problems:700757::\"No\"}    Medication side effects: {CHRONIC MED SIDE EFFECTS:582439::\"none\"}    Diet: { :202240}        {additional problems for provider to add:779467}    Problem list and histories reviewed & adjusted, as indicated.  Additional history: {NONE - AS DOCUMENTED:693773::\"as documented\"}    {HIST REVIEW/ LINKS 2:291123}    Reviewed and updated as needed this visit by clinical staff       Reviewed and updated as needed this visit by Provider         {PROVIDER CHARTING PREFERENCE:053682}    "

## 2019-02-02 LAB
ANION GAP SERPL CALCULATED.3IONS-SCNC: 4 MMOL/L (ref 3–14)
BUN SERPL-MCNC: 21 MG/DL (ref 7–30)
CALCIUM SERPL-MCNC: 9.3 MG/DL (ref 8.5–10.1)
CHLORIDE SERPL-SCNC: 104 MMOL/L (ref 94–109)
CHOLEST SERPL-MCNC: 191 MG/DL
CO2 SERPL-SCNC: 30 MMOL/L (ref 20–32)
CREAT SERPL-MCNC: 0.9 MG/DL (ref 0.52–1.04)
CREAT UR-MCNC: 82 MG/DL
GFR SERPL CREATININE-BSD FRML MDRD: 69 ML/MIN/{1.73_M2}
GLUCOSE SERPL-MCNC: 97 MG/DL (ref 70–99)
HDLC SERPL-MCNC: 71 MG/DL
LDLC SERPL CALC-MCNC: 105 MG/DL
MICROALBUMIN UR-MCNC: <5 MG/L
MICROALBUMIN/CREAT UR: NORMAL MG/G CR (ref 0–25)
NONHDLC SERPL-MCNC: 120 MG/DL
POTASSIUM SERPL-SCNC: 3.8 MMOL/L (ref 3.4–5.3)
SODIUM SERPL-SCNC: 138 MMOL/L (ref 133–144)
TRIGL SERPL-MCNC: 76 MG/DL

## 2019-02-04 LAB — HIV 1+2 AB+HIV1 P24 AG SERPL QL IA: NONREACTIVE

## 2019-02-08 ENCOUNTER — HOSPITAL ENCOUNTER (OUTPATIENT)
Dept: CARDIOLOGY | Facility: CLINIC | Age: 64
Discharge: HOME | End: 2019-02-08
Attending: INTERNAL MEDICINE | Admitting: INTERNAL MEDICINE
Payer: COMMERCIAL

## 2019-02-08 DIAGNOSIS — I35.0 AORTIC VALVE STENOSIS, ETIOLOGY OF CARDIAC VALVE DISEASE UNSPECIFIED: ICD-10-CM

## 2019-02-08 DIAGNOSIS — I35.1 AORTIC VALVE INSUFFICIENCY, ETIOLOGY OF CARDIAC VALVE DISEASE UNSPECIFIED: ICD-10-CM

## 2019-02-08 PROCEDURE — 93306 TTE W/DOPPLER COMPLETE: CPT

## 2019-02-08 PROCEDURE — 93306 TTE W/DOPPLER COMPLETE: CPT | Mod: 26 | Performed by: INTERNAL MEDICINE

## 2019-02-12 ENCOUNTER — PRE VISIT (OUTPATIENT)
Dept: CARDIOLOGY | Facility: CLINIC | Age: 64
End: 2019-02-12

## 2019-02-13 ENCOUNTER — OFFICE VISIT (OUTPATIENT)
Dept: CARDIOLOGY | Facility: CLINIC | Age: 64
End: 2019-02-13
Attending: INTERNAL MEDICINE
Payer: COMMERCIAL

## 2019-02-13 VITALS
SYSTOLIC BLOOD PRESSURE: 124 MMHG | WEIGHT: 242 LBS | BODY MASS INDEX: 36.68 KG/M2 | HEART RATE: 80 BPM | DIASTOLIC BLOOD PRESSURE: 82 MMHG | HEIGHT: 68 IN

## 2019-02-13 DIAGNOSIS — I10 ESSENTIAL HYPERTENSION, BENIGN: Primary | ICD-10-CM

## 2019-02-13 DIAGNOSIS — I35.0 NONRHEUMATIC AORTIC VALVE STENOSIS: ICD-10-CM

## 2019-02-13 DIAGNOSIS — R06.09 DOE (DYSPNEA ON EXERTION): ICD-10-CM

## 2019-02-13 DIAGNOSIS — Z13.6 CARDIOVASCULAR SCREENING; LDL GOAL LESS THAN 130: ICD-10-CM

## 2019-02-13 PROBLEM — E66.812 CLASS 2 OBESITY DUE TO EXCESS CALORIES WITHOUT SERIOUS COMORBIDITY WITH BODY MASS INDEX (BMI) OF 36.0 TO 36.9 IN ADULT: Status: ACTIVE | Noted: 2018-04-14

## 2019-02-13 PROBLEM — E66.09 CLASS 2 OBESITY DUE TO EXCESS CALORIES WITHOUT SERIOUS COMORBIDITY WITH BODY MASS INDEX (BMI) OF 36.0 TO 36.9 IN ADULT: Status: ACTIVE | Noted: 2018-04-14

## 2019-02-13 PROCEDURE — 93000 ELECTROCARDIOGRAM COMPLETE: CPT | Performed by: INTERNAL MEDICINE

## 2019-02-13 PROCEDURE — 99204 OFFICE O/P NEW MOD 45 MIN: CPT | Performed by: INTERNAL MEDICINE

## 2019-02-13 ASSESSMENT — MIFFLIN-ST. JEOR: SCORE: 1706.2

## 2019-02-13 NOTE — PROGRESS NOTES
Service Date: 02/13/2019      CLINIC VISIT      HISTORY OF PRESENT ILLNESS:  Ms. Bauer is a very nice 64-year-old woman with past medical history significant for a family history of coronary artery disease and valvular heart disease, a former smoker having quit in 1999, obesity, hypertension and aortic stenosis.      Heather's mother was a smoker and had coronary artery bypass grafting x3 in her 60s.  Her father is 76 and had valve surgery and Heather does not know any details.  Heather states she has had a heart murmur she thinks going back just 2 years.  She does not remember being told she had a heart murmur prior to that.  She has no exertional chest discomfort but states she does get short of breath when walking up hills or walking up 2 flights of stairs.  She has had no lightheadedness, dizziness, syncope or near syncope.  She has had no palpitations.      She had an echocardiogram which demonstrates a normal size and ejection fraction of 60%.  She has moderate-to-severe aortic stenosis with a mean gradient of 33.  Aortic valve area is calculated to be 0.9.  Her dimensionless index is 0.27.  Stroke volume index is 39 mL/m.  It appears that the valve is tricuspid and heavily calcified.      ASSESSMENT AND PLAN:  Heather has symptoms of shortness of breath and dyspnea on exertion.  It is unclear as to what this is from.  Her valve is not that bad that I would suspect it is causing the symptoms.  It could easily be her weight and deconditioning, as she has a body mass index of 36.9.  Another possibility is this could be underlying coronary artery disease, given her family history of heart problems.  At this time, I would proceed with a stress echocardiogram.  If this appears that she has adequate exercise tolerance and no evidence of underlying coronary artery disease, I then would perform a calcium score to see what her risk is for coronary artery disease and I would use this to help guide whether we need to start  her on a statin.  If, however, she appears to have significantly decreased exercise tolerance that may be related to her valve, then we may need to proceed towards aortic valve replacement.      The other possibility is this is mostly just weight and deconditioning and I have asked her to start exercising on a regular basis and try to lose weight, as aortic valve replacement is inevitable and she should get in shape for this.      Fasting lipid profile:  Total cholesterol is 191, HDL 71, LDL is 105, triglycerides are 76.  At this time, I am not going to start her on a statin but we will evaluate further as outlined above.      Obviously, if her stress test is significantly abnormal, she will need a heart catheterization.      Basic metabolic profile shows normal kidney function with creatinine of 0.9, a BUN of 21 and a potassium of 3.8.      I reviewed her medications.  We will continue them as is.  I would also consider adding a beta blocker, given her resting heart rate between 80 and 90 but I will run her stress test first.       Thank you for allowing me to participate in her care.      Adolph Kimbrough MD, FACC         ADOLPH KIMBROUGH MD, FACC             D: 2019   T: 2019   MT: DAY      Name:     ABBY ASHTON   MRN:      [id number]        Account:      [id number]   :      1955           Service Date: 2019      Document: [id number]

## 2019-02-13 NOTE — PROGRESS NOTES
HPI and Plan:   See dictation    Orders Placed This Encounter   Procedures     EKG 12-lead complete w/read - Clinics (performed today)     Exercise Stress Echocardiogram       No orders of the defined types were placed in this encounter.      There are no discontinued medications.      Encounter Diagnoses   Name Primary?     Essential hypertension, benign Yes     CARDIOVASCULAR SCREENING; LDL GOAL LESS THAN 130      BORJA (dyspnea on exertion)      Nonrheumatic aortic valve stenosis        CURRENT MEDICATIONS:  Current Outpatient Medications   Medication Sig Dispense Refill     lisinopril-hydrochlorothiazide (PRINZIDE/ZESTORETIC) 20-12.5 MG tablet Take 1 tablet by mouth daily 90 tablet 3     MULTIPLE MINERALS-VITAMINS OR TABS          ALLERGIES     Allergies   Allergen Reactions     No Known Drug Allergies        PAST MEDICAL HISTORY:  Past Medical History:   Diagnosis Date     Aortic valve stenosis      Essential hypertension, benign      Fracture, tibia, shaft      History of colposcopy with cervical biopsy 2/22/11    AMRITA I     Papanicolaou smear of vagina with atypical squamous cells cannot exclude high grade squamous intraepithelial lesion (ASC-H) 1/31/11       PAST SURGICAL HISTORY:  Past Surgical History:   Procedure Laterality Date     C APPENDECTOMY         FAMILY HISTORY:  Family History   Problem Relation Age of Onset     C.A.D. Mother         early 60's     Lipids Mother      Heart Disease Mother 68        heart bypass surgery     Hypertension Mother      Thyroid Disease Mother      Heart Disease Father         valvular disease     Thyroid Disease Paternal Aunt      Heart Disease Maternal Grandmother         heart attack       SOCIAL HISTORY:  Social History     Socioeconomic History     Marital status:      Spouse name: None     Number of children: 2     Years of education: None     Highest education level: None   Social Needs     Financial resource strain: None     Food insecurity - worry: None      "Food insecurity - inability: None     Transportation needs - medical: None     Transportation needs - non-medical: None   Occupational History     Employer: [employer]   Tobacco Use     Smoking status: Former Smoker     Smokeless tobacco: Never Used     Tobacco comment: quit 20 years ago   Substance and Sexual Activity     Alcohol use: Yes     Comment: wine every week     Drug use: No     Sexual activity: Yes     Birth control/protection: Condom   Other Topics Concern      Service Not Asked     Blood Transfusions Not Asked     Caffeine Concern Not Asked     Occupational Exposure Not Asked     Hobby Hazards Not Asked     Sleep Concern Not Asked     Stress Concern Not Asked     Weight Concern Not Asked     Special Diet Not Asked     Back Care Not Asked     Exercise Yes     Bike Helmet Not Asked     Seat Belt Yes     Self-Exams Not Asked     Parent/sibling w/ CABG, MI or angioplasty before 65F 55M? Not Asked   Social History Narrative     None       Review of Systems:  Skin:  Positive for lumps or bumps red pino and bump on lower left ankle - stings occ   Eyes:  Positive for glasses    ENT:  Positive for   itching in both ears  Respiratory:  Positive for dyspnea on exertion;shortness of breath     Cardiovascular:    Positive for;heaviness chest heaviness/tightness with exertion; occ discomfort in jaw - none recently  Gastroenterology: Negative      Genitourinary:  Negative      Musculoskeletal:  Positive for nocturnal cramping    Neurologic:  Positive for numbness or tingling of hands    Psychiatric:  Negative      Heme/Lymph/Imm:  Negative      Endocrine:  Negative        Physical Exam:  Vitals: /82 (BP Location: Right arm, Patient Position: Chair, Cuff Size: Adult Large)   Pulse 80   Ht 1.727 m (5' 8\")   Wt 109.8 kg (242 lb)   LMP 07/07/2008   BMI 36.80 kg/m      Constitutional:  cooperative, alert and oriented, well developed, well nourished, in no acute distress obese      Skin:  warm and dry to " the touch, no apparent skin lesions or masses noted          Head:  normocephalic, no masses or lesions        Eyes:  pupils equal and round, conjunctivae and lids unremarkable, sclera white, no xanthalasma, EOMS intact, no nystagmus        Lymph:      ENT:           Neck:  carotid pulses are full and equal bilaterally;no carotid bruit        Respiratory:            Cardiac: regular rhythm       systolic murmur;LUSB;grade 2        pulses full and equal                                        GI:    obese      Extremities and Muscular Skeletal:  no edema;no spinal abnormalities noted;normal muscle strength and tone              Neurological:  no gross motor deficits        Psych:  affect appropriate, oriented to time, person and place        CC  Sylvia Bird MD  [address] [facility] [address]  [place] [address]

## 2019-02-13 NOTE — LETTER
2/13/2019      Sylvia iBrd MD  [address]  [facility] [address]      RE: Heather Callejasjulia       Dear Colleague,    I had the pleasure of seeing Heather Bauer in the [facility].    Service Date: 02/13/2019      CLINIC VISIT      HISTORY OF PRESENT ILLNESS:  Ms. Bauer is a very nice 64-year-old woman with past medical history significant for a family history of coronary artery disease and valvular heart disease, a former smoker having quit in 1999, obesity, hypertension and aortic stenosis.      Heather's mother was a smoker and had coronary artery bypass grafting x3 in her 60s.  Her father is 76 and had valve surgery and Heather does not know any details.  Heather states she has had a heart murmur she thinks going back just 2 years.  She does not remember being told she had a heart murmur prior to that.  She has no exertional chest discomfort but states she does get short of breath when walking up hills or walking up 2 flights of stairs.  She has had no lightheadedness, dizziness, syncope or near syncope.  She has had no palpitations.      She had an echocardiogram which demonstrates a normal size and ejection fraction of 60%.  She has moderate-to-severe aortic stenosis with a mean gradient of 33.  Aortic valve area is calculated to be 0.9.  Her dimensionless index is 0.27.  Stroke volume index is 39 mL/m.  It appears that the valve is tricuspid and heavily calcified.      ASSESSMENT AND PLAN:  Heather has symptoms of shortness of breath and dyspnea on exertion.  It is unclear as to what this is from.  Her valve is not that bad that I would suspect it is causing the symptoms.  It could easily be her weight and deconditioning, as she has a body mass index of 36.9.  Another possibility is this could be underlying coronary artery disease, given her family history of heart problems.  At this time, I would proceed with a stress echocardiogram.  If this appears that she has  adequate exercise tolerance and no evidence of underlying coronary artery disease, I then would perform a calcium score to see what her risk is for coronary artery disease and I would use this to help guide whether we need to start her on a statin.  If, however, she appears to have significantly decreased exercise tolerance that may be related to her valve, then we may need to proceed towards aortic valve replacement.      The other possibility is this is mostly just weight and deconditioning and I have asked her to start exercising on a regular basis and try to lose weight, as aortic valve replacement is inevitable and she should get in shape for this.      Fasting lipid profile:  Total cholesterol is 191, HDL 71, LDL is 105, triglycerides are 76.  At this time, I am not going to start her on a statin but we will evaluate further as outlined above.      Obviously, if her stress test is significantly abnormal, she will need a heart catheterization.      Basic metabolic profile shows normal kidney function with creatinine of 0.9, a BUN of 21 and a potassium of 3.8.      I reviewed her medications.  We will continue them as is.  I would also consider adding a beta blocker, given her resting heart rate between 80 and 90 but I will run her stress test first.       Thank you for allowing me to participate in her care.      Adolph Kimbrough MD, FACC         ADOLPH KIMBROUGH MD, FACC             D: 2019   T: 2019   MT: DAY      Name:     ABBY ASHTON   MRN:      [id number]        Account:      [id number]   :      1955           Service Date: 2019      Document: [id number]         Outpatient Encounter Medications as of 2019   Medication Sig Dispense Refill     lisinopril-hydrochlorothiazide (PRINZIDE/ZESTORETIC) 20-12.5 MG tablet Take 1 tablet by mouth daily 90 tablet 3     MULTIPLE MINERALS-VITAMINS OR TABS        No facility-administered encounter medications on file as of  2/13/2019.          Again, thank you for allowing me to participate in the care of your patient.      Sincerely,    Adolph Dave MD     [facility]

## 2019-02-13 NOTE — LETTER
2/13/2019    Sylvia Bird MD  [address]  [facility] [address]    RE: Heather Bauer       Dear Colleague,    I had the pleasure of seeing Heather Bauer in the [facility].    HPI and Plan:   See dictation    Orders Placed This Encounter   Procedures     EKG 12-lead complete w/read - Clinics (performed today)     Exercise Stress Echocardiogram       No orders of the defined types were placed in this encounter.      There are no discontinued medications.      Encounter Diagnoses   Name Primary?     Essential hypertension, benign Yes     CARDIOVASCULAR SCREENING; LDL GOAL LESS THAN 130      BORJA (dyspnea on exertion)      Nonrheumatic aortic valve stenosis        CURRENT MEDICATIONS:  Current Outpatient Medications   Medication Sig Dispense Refill     lisinopril-hydrochlorothiazide (PRINZIDE/ZESTORETIC) 20-12.5 MG tablet Take 1 tablet by mouth daily 90 tablet 3     MULTIPLE MINERALS-VITAMINS OR TABS          ALLERGIES     Allergies   Allergen Reactions     No Known Drug Allergies        PAST MEDICAL HISTORY:  Past Medical History:   Diagnosis Date     Aortic valve stenosis      Essential hypertension, benign      Fracture, tibia, shaft      History of colposcopy with cervical biopsy 2/22/11    AMRITA I     Papanicolaou smear of vagina with atypical squamous cells cannot exclude high grade squamous intraepithelial lesion (ASC-H) 1/31/11       PAST SURGICAL HISTORY:  Past Surgical History:   Procedure Laterality Date     C APPENDECTOMY         FAMILY HISTORY:  Family History   Problem Relation Age of Onset     C.A.D. Mother         early 60's     Lipids Mother      Heart Disease Mother 68        heart bypass surgery     Hypertension Mother      Thyroid Disease Mother      Heart Disease Father         valvular disease     Thyroid Disease Paternal Aunt      Heart Disease Maternal Grandmother         heart attack       SOCIAL HISTORY:  Social History     Socioeconomic History      Marital status:      Spouse name: None     Number of children: 2     Years of education: None     Highest education level: None   Social Needs     Financial resource strain: None     Food insecurity - worry: None     Food insecurity - inability: None     Transportation needs - medical: None     Transportation needs - non-medical: None   Occupational History     Employer: [employer]   Tobacco Use     Smoking status: Former Smoker     Smokeless tobacco: Never Used     Tobacco comment: quit 20 years ago   Substance and Sexual Activity     Alcohol use: Yes     Comment: wine every week     Drug use: No     Sexual activity: Yes     Birth control/protection: Condom   Other Topics Concern      Service Not Asked     Blood Transfusions Not Asked     Caffeine Concern Not Asked     Occupational Exposure Not Asked     Hobby Hazards Not Asked     Sleep Concern Not Asked     Stress Concern Not Asked     Weight Concern Not Asked     Special Diet Not Asked     Back Care Not Asked     Exercise Yes     Bike Helmet Not Asked     Seat Belt Yes     Self-Exams Not Asked     Parent/sibling w/ CABG, MI or angioplasty before 65F 55M? Not Asked   Social History Narrative     None       Review of Systems:  Skin:  Positive for lumps or bumps red pino and bump on lower left ankle - stings occ   Eyes:  Positive for glasses    ENT:  Positive for   itching in both ears  Respiratory:  Positive for dyspnea on exertion;shortness of breath     Cardiovascular:    Positive for;heaviness chest heaviness/tightness with exertion; occ discomfort in jaw - none recently  Gastroenterology: Negative      Genitourinary:  Negative      Musculoskeletal:  Positive for nocturnal cramping    Neurologic:  Positive for numbness or tingling of hands    Psychiatric:  Negative      Heme/Lymph/Imm:  Negative      Endocrine:  Negative        Physical Exam:  Vitals: /82 (BP Location: Right arm, Patient Position: Chair, Cuff Size: Adult Large)   Pulse 80   " Ht 1.727 m (5' 8\")   Wt 109.8 kg (242 lb)   LMP 07/07/2008   BMI 36.80 kg/m       Constitutional:  cooperative, alert and oriented, well developed, well nourished, in no acute distress obese      Skin:  warm and dry to the touch, no apparent skin lesions or masses noted          Head:  normocephalic, no masses or lesions        Eyes:  pupils equal and round, conjunctivae and lids unremarkable, sclera white, no xanthalasma, EOMS intact, no nystagmus        Lymph:      ENT:           Neck:  carotid pulses are full and equal bilaterally;no carotid bruit        Respiratory:            Cardiac: regular rhythm       systolic murmur;LUSB;grade 2        pulses full and equal                                        GI:    obese      Extremities and Muscular Skeletal:  no edema;no spinal abnormalities noted;normal muscle strength and tone              Neurological:  no gross motor deficits        Psych:  affect appropriate, oriented to time, person and place        CC  Sylvia Bird MD  [address] [place], [place] [address] [address]                Thank you for allowing me to participate in the care of your patient.      Sincerely,     Adolph Dave MD     [facility]    cc:   Sylvia Bird MD  [address] [place], [place] [address] [id number]        "

## 2019-02-20 ENCOUNTER — HOSPITAL ENCOUNTER (OUTPATIENT)
Facility: CLINIC | Age: 64
End: 2019-02-20
Payer: COMMERCIAL

## 2019-02-20 ENCOUNTER — HOSPITAL ENCOUNTER (OUTPATIENT)
Facility: CLINIC | Age: 64
End: 2019-02-20

## 2019-02-20 ENCOUNTER — TELEPHONE (OUTPATIENT)
Dept: CARDIOLOGY | Facility: CLINIC | Age: 64
End: 2019-02-20

## 2019-02-20 ENCOUNTER — HOSPITAL ENCOUNTER (OUTPATIENT)
Dept: CARDIOLOGY | Facility: CLINIC | Age: 64
Discharge: HOME | End: 2019-02-20
Attending: INTERNAL MEDICINE | Admitting: INTERNAL MEDICINE
Payer: COMMERCIAL

## 2019-02-20 ENCOUNTER — OFFICE VISIT (OUTPATIENT)
Dept: CARDIOLOGY | Facility: CLINIC | Age: 64
End: 2019-02-20
Payer: COMMERCIAL

## 2019-02-20 DIAGNOSIS — R06.09 DOE (DYSPNEA ON EXERTION): Primary | ICD-10-CM

## 2019-02-20 DIAGNOSIS — R94.39 ABNORMAL STRESS TEST: ICD-10-CM

## 2019-02-20 DIAGNOSIS — R94.39 ABNORMAL STRESS TEST: Primary | ICD-10-CM

## 2019-02-20 DIAGNOSIS — I35.0 NONRHEUMATIC AORTIC VALVE STENOSIS: ICD-10-CM

## 2019-02-20 DIAGNOSIS — R06.09 DOE (DYSPNEA ON EXERTION): ICD-10-CM

## 2019-02-20 PROCEDURE — 40000264 ECHO STRESS ECHOCARDIOGRAM

## 2019-02-20 PROCEDURE — 93016 CV STRESS TEST SUPVJ ONLY: CPT | Performed by: INTERNAL MEDICINE

## 2019-02-20 PROCEDURE — 93321 DOPPLER ECHO F-UP/LMTD STD: CPT | Mod: 26 | Performed by: INTERNAL MEDICINE

## 2019-02-20 PROCEDURE — 25500064 ZZH RX 255 OP 636: Performed by: INTERNAL MEDICINE

## 2019-02-20 PROCEDURE — 93350 STRESS TTE ONLY: CPT | Mod: 26 | Performed by: INTERNAL MEDICINE

## 2019-02-20 PROCEDURE — 93018 CV STRESS TEST I&R ONLY: CPT | Performed by: INTERNAL MEDICINE

## 2019-02-20 PROCEDURE — 93325 DOPPLER ECHO COLOR FLOW MAPG: CPT | Mod: 26 | Performed by: INTERNAL MEDICINE

## 2019-02-20 PROCEDURE — 99215 OFFICE O/P EST HI 40 MIN: CPT | Performed by: INTERNAL MEDICINE

## 2019-02-20 RX ORDER — ATORVASTATIN CALCIUM 40 MG/1
40 TABLET, FILM COATED ORAL DAILY
Qty: 90 TABLET | Refills: 3 | Status: SHIPPED | OUTPATIENT
Start: 2019-02-20 | End: 2019-06-04

## 2019-02-20 RX ORDER — METOPROLOL SUCCINATE 25 MG/1
12.5 TABLET, EXTENDED RELEASE ORAL DAILY
Qty: 45 TABLET | Refills: 3 | Status: SHIPPED | OUTPATIENT
Start: 2019-02-20 | End: 2019-06-04

## 2019-02-20 RX ADMIN — HUMAN ALBUMIN MICROSPHERES AND PERFLUTREN 3 ML: 10; .22 INJECTION, SOLUTION INTRAVENOUS at 13:46

## 2019-02-20 NOTE — PROGRESS NOTES
HPI and Plan:     The patient is a pleasant 64-year-old female with a history of moderate to severe aortic stenosis who has had progressive dyspnea with exertion and some chest tightness with exertion is sent for an exercise stress echocardiogram by Dr. Rodriguez.  I read the stress echocardiogram at [facility] today.  It shows evidence for moderate to severe aortic stenosis.  There is evidence for LAD ischemia.  The patient was only able to exercise for 2 minutes and 26 seconds on the Roe protocol before developing dyspnea with exertion.  Fortunately, she has been having no symptoms at rest.  She denies any other cardiac symptoms such as orthopnea, paroxysmal nocturnal dyspnea, palpitations, presyncope, or syncope.    CURRENT MEDICATIONS:  Current Outpatient Medications   Medication Sig Dispense Refill     lisinopril-hydrochlorothiazide (PRINZIDE/ZESTORETIC) 20-12.5 MG tablet Take 1 tablet by mouth daily 90 tablet 3     MULTIPLE MINERALS-VITAMINS OR TABS          ALLERGIES     Allergies   Allergen Reactions     No Known Drug Allergies        PAST MEDICAL HISTORY:  Past Medical History:   Diagnosis Date     Aortic valve stenosis      Essential hypertension, benign      Fracture, tibia, shaft      History of colposcopy with cervical biopsy 2/22/11    AMRITA I     Papanicolaou smear of vagina with atypical squamous cells cannot exclude high grade squamous intraepithelial lesion (ASC-H) 1/31/11       PAST SURGICAL HISTORY:  Past Surgical History:   Procedure Laterality Date     C APPENDECTOMY         FAMILY HISTORY:  Family History   Problem Relation Age of Onset     C.A.D. Mother         early 60's     Lipids Mother      Heart Disease Mother 68        heart bypass surgery     Hypertension Mother      Thyroid Disease Mother      Heart Disease Father         valvular disease     Thyroid Disease Paternal Aunt      Heart Disease Maternal Grandmother         heart attack       SOCIAL HISTORY:  Social History      Socioeconomic History     Marital status:      Spouse name: Not on file     Number of children: 2     Years of education: Not on file     Highest education level: Not on file   Occupational History     Employer: [employer]   Social Needs     Financial resource strain: Not on file     Food insecurity:     Worry: Not on file     Inability: Not on file     Transportation needs:     Medical: Not on file     Non-medical: Not on file   Tobacco Use     Smoking status: Former Smoker     Smokeless tobacco: Never Used     Tobacco comment: quit 20 years ago   Substance and Sexual Activity     Alcohol use: Yes     Comment: wine every week     Drug use: No     Sexual activity: Yes     Birth control/protection: Condom   Lifestyle     Physical activity:     Days per week: Not on file     Minutes per session: Not on file     Stress: Not on file   Relationships     Social connections:     Talks on phone: Not on file     Gets together: Not on file     Attends Holiness service: Not on file     Active member of club or organization: Not on file     Attends meetings of clubs or organizations: Not on file     Relationship status: Not on file     Intimate partner violence:     Fear of current or ex partner: Not on file     Emotionally abused: Not on file     Physically abused: Not on file     Forced sexual activity: Not on file   Other Topics Concern      Service Not Asked     Blood Transfusions Not Asked     Caffeine Concern Not Asked     Occupational Exposure Not Asked     Hobby Hazards Not Asked     Sleep Concern Not Asked     Stress Concern Not Asked     Weight Concern Not Asked     Special Diet Not Asked     Back Care Not Asked     Exercise Yes     Bike Helmet Not Asked     Seat Belt Yes     Self-Exams Not Asked     Parent/sibling w/ CABG, MI or angioplasty before 65F 55M? Not Asked   Social History Narrative     Not on file       Review of Systems:  Skin:          Eyes:         ENT:         Respiratory:           Cardiovascular:         Gastroenterology:        Genitourinary:         Musculoskeletal:         Neurologic:         Psychiatric:         Heme/Lymph/Imm:         Endocrine:           Physical Exam:  Vitals: LMP 07/07/2008     Constitutional:  in no acute distress obese      Skin:  warm and dry to the touch          Head:  normocephalic        Eyes:  sclera white        Lymph:No Cervical lymphadenopathy present     ENT:  no pallor or cyanosis        Neck:  no stiffness        Respiratory:  clear to auscultation         Cardiac: regular rhythm       systolic murmur;LUSB;grade 2        pulses full and equal                                        GI:    obese      Extremities and Muscular Skeletal:  no edema;no spinal abnormalities noted;normal muscle strength and tone              Neurological:  no gross motor deficits        Psych:  affect appropriate, oriented to time, person and place      Impression/plan:    The patient is a pleasant 64-year-old female with moderate to severe aortic stenosis and a abnormal stress echocardiogram suggesting ischemia in the LAD.  Fortunately, she is having no symptoms at rest and I believe that we can proceed to an outpatient cardiac catheterization.  I discussed her case with Dr. Rodriguez today who is in agreement with proceeding with a cardiac catheterization.  Informed consent was obtained after explaining the risks and benefits of the cardiac catheterization with the patient.  She is instructed to present to the emergency department with any new rest symptoms.  I have asked her to start aspirin 81 mg a day, and I will initiate low-dose beta-blocker therapy and statin therapy.  Further recommendations will be based on the results of her cardiac catheterization.    CC  No referring provider defined for this encounter.

## 2019-02-20 NOTE — TELEPHONE ENCOUNTER
Message from Dr Dave-     She will need right heart and cors and apt to TAVR clinic.    Order changed to left and right heart catheterization per Dr Dave. Left message for Anabela NAM RN with TAV[facility] group regarding referral.     2/21/19: Discussed with Anabela Peñaloza to call Dr Dave to discuss reason for TAVR and candidacy.

## 2019-02-20 NOTE — PROGRESS NOTES
Treadmill stress echo completed.  Patient talked with Dr. Urias and Dr. Dave about the results today.

## 2019-02-20 NOTE — LETTER
2/20/2019    Sylvia Bird MD  [address]  [facility] [address]    RE: Heather Bauer       Dear Colleague,    I had the pleasure of seeing Heather Bauer in the [facility].    HPI and Plan:     The patient is a pleasant 64-year-old female with a history of moderate to severe aortic stenosis who has had progressive dyspnea with exertion and some chest tightness with exertion is sent for an exercise stress echocardiogram by Dr. Rodriguez.  I read the stress echocardiogram at [facility] today.  It shows evidence for moderate to severe aortic stenosis.  There is evidence for LAD ischemia.  The patient was only able to exercise for 2 minutes and 26 seconds on the Roe protocol before developing dyspnea with exertion.  Fortunately, she has been having no symptoms at rest.  She denies any other cardiac symptoms such as orthopnea, paroxysmal nocturnal dyspnea, palpitations, presyncope, or syncope.    CURRENT MEDICATIONS:  Current Outpatient Medications   Medication Sig Dispense Refill     lisinopril-hydrochlorothiazide (PRINZIDE/ZESTORETIC) 20-12.5 MG tablet Take 1 tablet by mouth daily 90 tablet 3     MULTIPLE MINERALS-VITAMINS OR TABS          ALLERGIES     Allergies   Allergen Reactions     No Known Drug Allergies        PAST MEDICAL HISTORY:  Past Medical History:   Diagnosis Date     Aortic valve stenosis      Essential hypertension, benign      Fracture, tibia, shaft      History of colposcopy with cervical biopsy 2/22/11    AMRITA I     Papanicolaou smear of vagina with atypical squamous cells cannot exclude high grade squamous intraepithelial lesion (ASC-H) 1/31/11       PAST SURGICAL HISTORY:  Past Surgical History:   Procedure Laterality Date     C APPENDECTOMY         FAMILY HISTORY:  Family History   Problem Relation Age of Onset     C.A.D. Mother         early 60's     Lipids Mother      Heart Disease Mother 68        heart bypass surgery      Hypertension Mother      Thyroid Disease Mother      Heart Disease Father         valvular disease     Thyroid Disease Paternal Aunt      Heart Disease Maternal Grandmother         heart attack       SOCIAL HISTORY:  Social History     Socioeconomic History     Marital status:      Spouse name: Not on file     Number of children: 2     Years of education: Not on file     Highest education level: Not on file   Occupational History     Employer: [employer]   Social Needs     Financial resource strain: Not on file     Food insecurity:     Worry: Not on file     Inability: Not on file     Transportation needs:     Medical: Not on file     Non-medical: Not on file   Tobacco Use     Smoking status: Former Smoker     Smokeless tobacco: Never Used     Tobacco comment: quit 20 years ago   Substance and Sexual Activity     Alcohol use: Yes     Comment: wine every week     Drug use: No     Sexual activity: Yes     Birth control/protection: Condom   Lifestyle     Physical activity:     Days per week: Not on file     Minutes per session: Not on file     Stress: Not on file   Relationships     Social connections:     Talks on phone: Not on file     Gets together: Not on file     Attends Episcopal service: Not on file     Active member of club or organization: Not on file     Attends meetings of clubs or organizations: Not on file     Relationship status: Not on file     Intimate partner violence:     Fear of current or ex partner: Not on file     Emotionally abused: Not on file     Physically abused: Not on file     Forced sexual activity: Not on file   Other Topics Concern      Service Not Asked     Blood Transfusions Not Asked     Caffeine Concern Not Asked     Occupational Exposure Not Asked     Hobby Hazards Not Asked     Sleep Concern Not Asked     Stress Concern Not Asked     Weight Concern Not Asked     Special Diet Not Asked     Back Care Not Asked     Exercise Yes     Bike Helmet Not Asked     Seat Belt  Yes     Self-Exams Not Asked     Parent/sibling w/ CABG, MI or angioplasty before 65F 55M? Not Asked   Social History Narrative     Not on file       Review of Systems:  Skin:          Eyes:         ENT:         Respiratory:          Cardiovascular:         Gastroenterology:        Genitourinary:         Musculoskeletal:         Neurologic:         Psychiatric:         Heme/Lymph/Imm:         Endocrine:           Physical Exam:  Vitals: LMP 07/07/2008     Constitutional:  in no acute distress obese      Skin:  warm and dry to the touch          Head:  normocephalic        Eyes:  sclera white        Lymph:No Cervical lymphadenopathy present     ENT:  no pallor or cyanosis        Neck:  no stiffness        Respiratory:  clear to auscultation         Cardiac: regular rhythm       systolic murmur;LUSB;grade 2        pulses full and equal                                        GI:    obese      Extremities and Muscular Skeletal:  no edema;no spinal abnormalities noted;normal muscle strength and tone              Neurological:  no gross motor deficits        Psych:  affect appropriate, oriented to time, person and place      Impression/plan:    The patient is a pleasant 64-year-old female with moderate to severe aortic stenosis and a abnormal stress echocardiogram suggesting ischemia in the LAD.  Fortunately, she is having no symptoms at rest and I believe that we can proceed to an outpatient cardiac catheterization.  I discussed her case with Dr. Rodriguez today who is in agreement with proceeding with a cardiac catheterization.  Informed consent was obtained after explaining the risks and benefits of the cardiac catheterization with the patient.  She is instructed to present to the emergency department with any new rest symptoms.  I have asked her to start aspirin 81 mg a day, and I will initiate low-dose beta-blocker therapy and statin therapy.  Further recommendations will be based on the results of her cardiac  catheterization.        Thank you for allowing me to participate in the care of your patient.    Sincerely,     Dieter Urias MD     [facility]

## 2019-02-20 NOTE — TELEPHONE ENCOUNTER
Pt underwent stress echocardiogram today, results below. Routed patient call to Dr Dave and Dr Dave paged for further instruction.     Interpretation Summary  Target Heart Rate was achieved.  There was a maximum 2.0mm ST segment depression in the inferior lead(s).  There was a maximum 2.0mm ST segment depression in the lateral lead(s).  Normal rest wall motion with stress-induced wall motion abnormalities  consistent with ischemia in the anterior wall(s).  Moderate to severe valvular aortic stenosis.  The results were discussed with the ordering MD.  Reyn[place]: Dr Urias

## 2019-02-20 NOTE — LETTER
2/20/2019    Sylvia Bird MD  [address]  [facility] [address]    RE: Heather Bauer       Dear Colleague,    I had the pleasure of seeing Heather Bauer in the [facility].    HPI and Plan:     The patient is a pleasant 64-year-old female with a history of moderate to severe aortic stenosis who has had progressive dyspnea with exertion and some chest tightness with exertion is sent for an exercise stress echocardiogram by Dr. Rodriguez.  I read the stress echocardiogram at [facility] today.  It shows evidence for moderate to severe aortic stenosis.  There is evidence for LAD ischemia.  The patient was only able to exercise for 2 minutes and 26 seconds on the Roe protocol before developing dyspnea with exertion.  Fortunately, she has been having no symptoms at rest.  She denies any other cardiac symptoms such as orthopnea, paroxysmal nocturnal dyspnea, palpitations, presyncope, or syncope.    CURRENT MEDICATIONS:  Current Outpatient Medications   Medication Sig Dispense Refill     lisinopril-hydrochlorothiazide (PRINZIDE/ZESTORETIC) 20-12.5 MG tablet Take 1 tablet by mouth daily 90 tablet 3     MULTIPLE MINERALS-VITAMINS OR TABS          ALLERGIES     Allergies   Allergen Reactions     No Known Drug Allergies        PAST MEDICAL HISTORY:  Past Medical History:   Diagnosis Date     Aortic valve stenosis      Essential hypertension, benign      Fracture, tibia, shaft      History of colposcopy with cervical biopsy 2/22/11    AMRITA I     Papanicolaou smear of vagina with atypical squamous cells cannot exclude high grade squamous intraepithelial lesion (ASC-H) 1/31/11       PAST SURGICAL HISTORY:  Past Surgical History:   Procedure Laterality Date     C APPENDECTOMY         FAMILY HISTORY:  Family History   Problem Relation Age of Onset     C.A.D. Mother         early 60's     Lipids Mother      Heart Disease Mother 68        heart bypass surgery      Hypertension Mother      Thyroid Disease Mother      Heart Disease Father         valvular disease     Thyroid Disease Paternal Aunt      Heart Disease Maternal Grandmother         heart attack       SOCIAL HISTORY:  Social History     Socioeconomic History     Marital status:      Spouse name: Not on file     Number of children: 2     Years of education: Not on file     Highest education level: Not on file   Occupational History     Employer: [employer]   Social Needs     Financial resource strain: Not on file     Food insecurity:     Worry: Not on file     Inability: Not on file     Transportation needs:     Medical: Not on file     Non-medical: Not on file   Tobacco Use     Smoking status: Former Smoker     Smokeless tobacco: Never Used     Tobacco comment: quit 20 years ago   Substance and Sexual Activity     Alcohol use: Yes     Comment: wine every week     Drug use: No     Sexual activity: Yes     Birth control/protection: Condom   Lifestyle     Physical activity:     Days per week: Not on file     Minutes per session: Not on file     Stress: Not on file   Relationships     Social connections:     Talks on phone: Not on file     Gets together: Not on file     Attends Spiritism service: Not on file     Active member of club or organization: Not on file     Attends meetings of clubs or organizations: Not on file     Relationship status: Not on file     Intimate partner violence:     Fear of current or ex partner: Not on file     Emotionally abused: Not on file     Physically abused: Not on file     Forced sexual activity: Not on file   Other Topics Concern      Service Not Asked     Blood Transfusions Not Asked     Caffeine Concern Not Asked     Occupational Exposure Not Asked     Hobby Hazards Not Asked     Sleep Concern Not Asked     Stress Concern Not Asked     Weight Concern Not Asked     Special Diet Not Asked     Back Care Not Asked     Exercise Yes     Bike Helmet Not Asked     Seat Belt  Yes     Self-Exams Not Asked     Parent/sibling w/ CABG, MI or angioplasty before 65F 55M? Not Asked   Social History Narrative     Not on file       Review of Systems:  Skin:          Eyes:         ENT:         Respiratory:          Cardiovascular:         Gastroenterology:        Genitourinary:         Musculoskeletal:         Neurologic:         Psychiatric:         Heme/Lymph/Imm:         Endocrine:           Physical Exam:  Vitals: LMP 07/07/2008     Constitutional:  in no acute distress obese      Skin:  warm and dry to the touch          Head:  normocephalic        Eyes:  sclera white        Lymph:No Cervical lymphadenopathy present     ENT:  no pallor or cyanosis        Neck:  no stiffness        Respiratory:  clear to auscultation         Cardiac: regular rhythm       systolic murmur;LUSB;grade 2        pulses full and equal                                        GI:    obese      Extremities and Muscular Skeletal:  no edema;no spinal abnormalities noted;normal muscle strength and tone              Neurological:  no gross motor deficits        Psych:  affect appropriate, oriented to time, person and place      Impression/plan:    The patient is a pleasant 64-year-old female with moderate to severe aortic stenosis and a abnormal stress echocardiogram suggesting ischemia in the LAD.  Fortunately, she is having no symptoms at rest and I believe that we can proceed to an outpatient cardiac catheterization.  I discussed her case with Dr. Rodriguez today who is in agreement with proceeding with a cardiac catheterization.  Informed consent was obtained after explaining the risks and benefits of the cardiac catheterization with the patient.  She is instructed to present to the emergency department with any new rest symptoms.  I have asked her to start aspirin 81 mg a day, and I will initiate low-dose beta-blocker therapy and statin therapy.  Further recommendations will be based on the results of her cardiac  catheterization.    CC  No referring provider defined for this encounter.                Thank you for allowing me to participate in the care of your patient.      Sincerely,     Dieter Urias MD     [facility]    cc:   No referring provider defined for this encounter.

## 2019-02-20 NOTE — TELEPHONE ENCOUNTER
Message from Dr Urias-     This patient of Dr Dave's needs a cath.  She had an abnormal stress echo and I saw the patient as did Chidi. I dictated a visit note so she should be ready to go straight to the cath lab.     Thanks    Pt contacted to discuss preferred location, order entered for left heart catheterization to be done at [place] per patient preference. Team 2 phone number provided, reminded patient of Dr Urias's instruction to go to the ED with any symptoms at rest (see note 2/20/19). Pt verbalized understanding.

## 2019-02-21 ENCOUNTER — TELEPHONE (OUTPATIENT)
Dept: CARDIOLOGY | Facility: CLINIC | Age: 64
End: 2019-02-21

## 2019-02-21 NOTE — TELEPHONE ENCOUNTER
Dr. Dave wanted this patient to be seen in TAVR clinic for possible low risk TAVR.  Since she is only 64 and is not on Medicare she would not qualify for low risk TAVR.  I reviewed this with Dr. Dave he would like patient to see Dr. Palmer after her cath results are known.  I have left patient this information and also contacted Nilsa Couch with Dr. Palmer.

## 2019-02-22 ENCOUNTER — TELEPHONE (OUTPATIENT)
Dept: CARDIOLOGY | Facility: CLINIC | Age: 64
End: 2019-02-22

## 2019-02-22 NOTE — TELEPHONE ENCOUNTER
Per task, pt needs to schedule a clinic appt with Dr. Palmer. LM asking pt to call back. Will try again later

## 2019-02-22 NOTE — TELEPHONE ENCOUNTER
[facility] Call [facility]    Phone Message    May a detailed message be left on voicemail: yes    Reason for Call: Other: Pt scheduled appt for Monday at [facility] with Dr. Palmer.     Action Taken: Message routed to:  [facility] ([facility] Cardiology

## 2019-02-25 DIAGNOSIS — I35.0 NONRHEUMATIC AORTIC VALVE STENOSIS: Primary | ICD-10-CM

## 2019-02-26 ENCOUNTER — TELEPHONE (OUTPATIENT)
Dept: CARDIOLOGY | Facility: CLINIC | Age: 64
End: 2019-02-26

## 2019-02-26 NOTE — TELEPHONE ENCOUNTER
Patient called asking who will be doing her angiogram 3-11-19.  Dr. Dave is in the cath lab that day.  Patient wants to know if she can do more activity but advised to follow limited activity as recommended at OV 2-20-19.  Patient states she is feeling well. Will call with any questions or concerns. Prior.

## 2019-02-28 ENCOUNTER — DOCUMENTATION ONLY (OUTPATIENT)
Dept: CARDIOLOGY | Facility: CLINIC | Age: 64
End: 2019-02-28

## 2019-02-28 NOTE — PROGRESS NOTES
Patient is scheduled for a left heart angiogram 3/11/19 @ 8:30 AM. Patient does not quality for the TAVR program at this time. She is scheduled to see Dr. Palmer on 3/11/19 at 2PM to discuss possible valve surgery after her angiogram.  Message to Dr. Dave to confirm that only a left heart cath is needed as she is not completing a TAVR work up. The original order was for both a right and left heart angiogram.    3/1/19 message from Dr. Dave: OK for just left heart

## 2019-03-06 ENCOUNTER — TELEPHONE (OUTPATIENT)
Dept: CARDIOLOGY | Facility: CLINIC | Age: 64
End: 2019-03-06

## 2019-03-06 RX ORDER — SODIUM CHLORIDE 9 MG/ML
INJECTION, SOLUTION INTRAVENOUS CONTINUOUS
Status: CANCELLED | OUTPATIENT
Start: 2019-03-06

## 2019-03-06 RX ORDER — POTASSIUM CHLORIDE 1500 MG/1
20 TABLET, EXTENDED RELEASE ORAL
Status: CANCELLED | OUTPATIENT
Start: 2019-03-06

## 2019-03-06 RX ORDER — LIDOCAINE 40 MG/G
CREAM TOPICAL
Status: CANCELLED | OUTPATIENT
Start: 2019-03-06

## 2019-03-06 NOTE — TELEPHONE ENCOUNTER
Spoke with patient to review pre-planning for angiogram: Contacted patient to review pre-cath procedures: patient is scheduled for coronary angiogram (left)  on 3/11/19 . Patient's arrival time is 0630 and procedure time is 0830. Patient is aware to be NPO except for medications. Patient will hold the medication lisinopril-HCTZ . Patient has arranged for transportation and 24 hour f/u care. Patient has no known contract dye allergy. Patient is not diabetic. Patient is not taking anti-coagulation medication. Patient's renal function is WNL for procedure. Patient will aspirin 325mg the day before and morning of procedure. Order for procedure entered.

## 2019-03-11 ENCOUNTER — HOSPITAL ENCOUNTER (OUTPATIENT)
Facility: CLINIC | Age: 64
Discharge: HOME | End: 2019-03-11
Admitting: INTERNAL MEDICINE
Payer: COMMERCIAL

## 2019-03-11 ENCOUNTER — SURGERY (OUTPATIENT)
Age: 64
End: 2019-03-11
Payer: COMMERCIAL

## 2019-03-11 VITALS
HEART RATE: 71 BPM | HEIGHT: 68 IN | OXYGEN SATURATION: 98 % | TEMPERATURE: 98 F | RESPIRATION RATE: 18 BRPM | WEIGHT: 242 LBS | BODY MASS INDEX: 36.68 KG/M2 | SYSTOLIC BLOOD PRESSURE: 98 MMHG | DIASTOLIC BLOOD PRESSURE: 63 MMHG

## 2019-03-11 DIAGNOSIS — Z98.61 POSTSURGICAL PERCUTANEOUS TRANSLUMINAL CORONARY ANGIOPLASTY STATUS: ICD-10-CM

## 2019-03-11 DIAGNOSIS — I35.0 NONRHEUMATIC AORTIC VALVE STENOSIS: ICD-10-CM

## 2019-03-11 DIAGNOSIS — I25.118 CORONARY ARTERY DISEASE OF NATIVE ARTERY OF NATIVE HEART WITH STABLE ANGINA PECTORIS (H): ICD-10-CM

## 2019-03-11 PROBLEM — Z98.890 STATUS POST CORONARY ANGIOGRAM: Status: ACTIVE | Noted: 2019-03-11

## 2019-03-11 LAB
ALBUMIN SERPL-MCNC: 3.7 G/DL (ref 3.4–5)
ALP SERPL-CCNC: 86 U/L (ref 40–150)
ALT SERPL W P-5'-P-CCNC: 22 U/L (ref 0–50)
ANION GAP SERPL CALCULATED.3IONS-SCNC: 6 MMOL/L (ref 3–14)
APTT PPP: 31 SEC (ref 22–37)
AST SERPL W P-5'-P-CCNC: 18 U/L (ref 0–45)
BILIRUB DIRECT SERPL-MCNC: 0.2 MG/DL (ref 0–0.2)
BILIRUB SERPL-MCNC: 0.6 MG/DL (ref 0.2–1.3)
BUN SERPL-MCNC: 17 MG/DL (ref 7–30)
CALCIUM SERPL-MCNC: 9.1 MG/DL (ref 8.5–10.1)
CHLORIDE SERPL-SCNC: 106 MMOL/L (ref 94–109)
CO2 SERPL-SCNC: 29 MMOL/L (ref 20–32)
CREAT SERPL-MCNC: 0.86 MG/DL (ref 0.52–1.04)
ERYTHROCYTE [DISTWIDTH] IN BLOOD BY AUTOMATED COUNT: 12.4 % (ref 10–15)
GFR SERPL CREATININE-BSD FRML MDRD: 72 ML/MIN/{1.73_M2}
GLUCOSE SERPL-MCNC: 107 MG/DL (ref 70–99)
HBA1C MFR BLD: 5.7 % (ref 0–5.6)
HCT VFR BLD AUTO: 42 % (ref 35–47)
HGB BLD-MCNC: 14.4 G/DL (ref 11.7–15.7)
INR PPP: 0.96 (ref 0.86–1.14)
KCT BLD-ACNC: 339 SEC (ref 75–150)
MCH RBC QN AUTO: 32.5 PG (ref 26.5–33)
MCHC RBC AUTO-ENTMCNC: 34.3 G/DL (ref 31.5–36.5)
MCV RBC AUTO: 95 FL (ref 78–100)
PLATELET # BLD AUTO: 275 10E9/L (ref 150–450)
POTASSIUM SERPL-SCNC: 3.9 MMOL/L (ref 3.4–5.3)
PROT SERPL-MCNC: 7.3 G/DL (ref 6.8–8.8)
RBC # BLD AUTO: 4.43 10E12/L (ref 3.8–5.2)
SODIUM SERPL-SCNC: 141 MMOL/L (ref 133–144)
WBC # BLD AUTO: 6 10E9/L (ref 4–11)

## 2019-03-11 PROCEDURE — 93010 ELECTROCARDIOGRAM REPORT: CPT | Performed by: INTERNAL MEDICINE

## 2019-03-11 PROCEDURE — C9600 PERC DRUG-EL COR STENT SING: HCPCS | Performed by: INTERNAL MEDICINE

## 2019-03-11 PROCEDURE — 99152 MOD SED SAME PHYS/QHP 5/>YRS: CPT | Performed by: INTERNAL MEDICINE

## 2019-03-11 PROCEDURE — 40000065 ZZH STATISTIC EKG NON-CHARGEABLE

## 2019-03-11 PROCEDURE — 93005 ELECTROCARDIOGRAM TRACING: CPT

## 2019-03-11 PROCEDURE — 86901 BLOOD TYPING SEROLOGIC RH(D): CPT | Performed by: SURGERY

## 2019-03-11 PROCEDURE — C1894 INTRO/SHEATH, NON-LASER: HCPCS | Performed by: INTERNAL MEDICINE

## 2019-03-11 PROCEDURE — 36415 COLL VENOUS BLD VENIPUNCTURE: CPT | Performed by: INTERNAL MEDICINE

## 2019-03-11 PROCEDURE — 25800030 ZZH RX IP 258 OP 636: Performed by: INTERNAL MEDICINE

## 2019-03-11 PROCEDURE — 27210794 ZZH OR GENERAL SUPPLY STERILE: Performed by: INTERNAL MEDICINE

## 2019-03-11 PROCEDURE — C1874 STENT, COATED/COV W/DEL SYS: HCPCS | Performed by: INTERNAL MEDICINE

## 2019-03-11 PROCEDURE — 40000852 ZZH STATISTIC HEART CATH LAB OR EP LAB

## 2019-03-11 PROCEDURE — 80048 BASIC METABOLIC PNL TOTAL CA: CPT | Performed by: INTERNAL MEDICINE

## 2019-03-11 PROCEDURE — 25000132 ZZH RX MED GY IP 250 OP 250 PS 637: Performed by: INTERNAL MEDICINE

## 2019-03-11 PROCEDURE — C1725 CATH, TRANSLUMIN NON-LASER: HCPCS | Performed by: INTERNAL MEDICINE

## 2019-03-11 PROCEDURE — 93454 CORONARY ARTERY ANGIO S&I: CPT | Performed by: INTERNAL MEDICINE

## 2019-03-11 PROCEDURE — 93454 CORONARY ARTERY ANGIO S&I: CPT | Mod: 26 | Performed by: INTERNAL MEDICINE

## 2019-03-11 PROCEDURE — C1769 GUIDE WIRE: HCPCS | Performed by: INTERNAL MEDICINE

## 2019-03-11 PROCEDURE — 85027 COMPLETE CBC AUTOMATED: CPT | Performed by: INTERNAL MEDICINE

## 2019-03-11 PROCEDURE — 40000235 ZZH STATISTIC TELEMETRY

## 2019-03-11 PROCEDURE — 85730 THROMBOPLASTIN TIME PARTIAL: CPT | Performed by: INTERNAL MEDICINE

## 2019-03-11 PROCEDURE — 85610 PROTHROMBIN TIME: CPT | Performed by: INTERNAL MEDICINE

## 2019-03-11 PROCEDURE — 80076 HEPATIC FUNCTION PANEL: CPT | Performed by: INTERNAL MEDICINE

## 2019-03-11 PROCEDURE — 83036 HEMOGLOBIN GLYCOSYLATED A1C: CPT | Performed by: INTERNAL MEDICINE

## 2019-03-11 PROCEDURE — 25000125 ZZHC RX 250: Performed by: INTERNAL MEDICINE

## 2019-03-11 PROCEDURE — C1887 CATHETER, GUIDING: HCPCS | Performed by: INTERNAL MEDICINE

## 2019-03-11 PROCEDURE — 99153 MOD SED SAME PHYS/QHP EA: CPT | Performed by: INTERNAL MEDICINE

## 2019-03-11 PROCEDURE — 92928 PRQ TCAT PLMT NTRAC ST 1 LES: CPT | Mod: LD | Performed by: INTERNAL MEDICINE

## 2019-03-11 PROCEDURE — 40000854 ZZH STATISTIC SIMPLE TUBE INSERTION/CHARGE, PORT, CATH, FISTULOGRAM

## 2019-03-11 PROCEDURE — 86900 BLOOD TYPING SEROLOGIC ABO: CPT | Performed by: SURGERY

## 2019-03-11 PROCEDURE — 85347 COAGULATION TIME ACTIVATED: CPT

## 2019-03-11 PROCEDURE — 86850 RBC ANTIBODY SCREEN: CPT | Performed by: SURGERY

## 2019-03-11 PROCEDURE — 25000128 H RX IP 250 OP 636: Performed by: INTERNAL MEDICINE

## 2019-03-11 DEVICE — STENT SYNERGY DRUG ELUTING 3.50X16MM  H7493926016350: Type: IMPLANTABLE DEVICE | Status: FUNCTIONAL

## 2019-03-11 RX ORDER — CLOPIDOGREL BISULFATE 75 MG/1
TABLET ORAL
Status: DISCONTINUED | OUTPATIENT
Start: 2019-03-11 | End: 2019-03-11 | Stop reason: HOSPADM

## 2019-03-11 RX ORDER — SODIUM CHLORIDE 9 MG/ML
INJECTION, SOLUTION INTRAVENOUS CONTINUOUS
Status: ACTIVE | OUTPATIENT
Start: 2019-03-11 | End: 2019-03-11

## 2019-03-11 RX ORDER — NITROGLYCERIN 5 MG/ML
VIAL (ML) INTRAVENOUS
Status: DISCONTINUED | OUTPATIENT
Start: 2019-03-11 | End: 2019-03-11 | Stop reason: HOSPADM

## 2019-03-11 RX ORDER — DOBUTAMINE HYDROCHLORIDE 200 MG/100ML
2-20 INJECTION INTRAVENOUS CONTINUOUS PRN
Status: DISCONTINUED | OUTPATIENT
Start: 2019-03-11 | End: 2019-03-11 | Stop reason: HOSPADM

## 2019-03-11 RX ORDER — NITROGLYCERIN 0.4 MG/1
0.4 TABLET SUBLINGUAL EVERY 5 MIN PRN
Status: DISCONTINUED | OUTPATIENT
Start: 2019-03-11 | End: 2019-03-11 | Stop reason: HOSPADM

## 2019-03-11 RX ORDER — ASPIRIN 81 MG/1
81 TABLET ORAL DAILY
COMMUNITY

## 2019-03-11 RX ORDER — HEPARIN SODIUM 1000 [USP'U]/ML
INJECTION, SOLUTION INTRAVENOUS; SUBCUTANEOUS
Status: DISCONTINUED | OUTPATIENT
Start: 2019-03-11 | End: 2019-03-11 | Stop reason: HOSPADM

## 2019-03-11 RX ORDER — FENTANYL CITRATE 50 UG/ML
25-50 INJECTION, SOLUTION INTRAMUSCULAR; INTRAVENOUS
Status: DISCONTINUED | OUTPATIENT
Start: 2019-03-11 | End: 2019-03-11 | Stop reason: HOSPADM

## 2019-03-11 RX ORDER — NITROGLYCERIN 20 MG/100ML
.07-1.82 INJECTION INTRAVENOUS CONTINUOUS PRN
Status: DISCONTINUED | OUTPATIENT
Start: 2019-03-11 | End: 2019-03-11 | Stop reason: HOSPADM

## 2019-03-11 RX ORDER — POTASSIUM CHLORIDE 1500 MG/1
20 TABLET, EXTENDED RELEASE ORAL
Status: DISCONTINUED | OUTPATIENT
Start: 2019-03-11 | End: 2019-03-11 | Stop reason: HOSPADM

## 2019-03-11 RX ORDER — CLOPIDOGREL BISULFATE 75 MG/1
75 TABLET ORAL DAILY
Qty: 90 TABLET | Refills: 3 | Status: SHIPPED | OUTPATIENT
Start: 2019-03-12 | End: 2019-05-01

## 2019-03-11 RX ORDER — ATROPINE SULFATE 0.1 MG/ML
0.5 INJECTION INTRAVENOUS EVERY 5 MIN PRN
Status: DISCONTINUED | OUTPATIENT
Start: 2019-03-11 | End: 2019-03-11 | Stop reason: HOSPADM

## 2019-03-11 RX ORDER — ACETAMINOPHEN 325 MG/1
325-650 TABLET ORAL EVERY 4 HOURS PRN
Status: DISCONTINUED | OUTPATIENT
Start: 2019-03-11 | End: 2019-03-11 | Stop reason: HOSPADM

## 2019-03-11 RX ORDER — LIDOCAINE 40 MG/G
CREAM TOPICAL
Status: DISCONTINUED | OUTPATIENT
Start: 2019-03-11 | End: 2019-03-11 | Stop reason: HOSPADM

## 2019-03-11 RX ORDER — DOPAMINE HYDROCHLORIDE 160 MG/100ML
2-20 INJECTION, SOLUTION INTRAVENOUS CONTINUOUS PRN
Status: DISCONTINUED | OUTPATIENT
Start: 2019-03-11 | End: 2019-03-11 | Stop reason: HOSPADM

## 2019-03-11 RX ORDER — ARGATROBAN 1 MG/ML
350 INJECTION, SOLUTION INTRAVENOUS
Status: DISCONTINUED | OUTPATIENT
Start: 2019-03-11 | End: 2019-03-11 | Stop reason: HOSPADM

## 2019-03-11 RX ORDER — CLOPIDOGREL BISULFATE 75 MG/1
75 TABLET ORAL ONCE
Status: DISCONTINUED | OUTPATIENT
Start: 2019-03-12 | End: 2019-03-11 | Stop reason: HOSPADM

## 2019-03-11 RX ORDER — NALOXONE HYDROCHLORIDE 0.4 MG/ML
.2-.4 INJECTION, SOLUTION INTRAMUSCULAR; INTRAVENOUS; SUBCUTANEOUS
Status: DISCONTINUED | OUTPATIENT
Start: 2019-03-11 | End: 2019-03-11 | Stop reason: HOSPADM

## 2019-03-11 RX ORDER — NALOXONE HYDROCHLORIDE 0.4 MG/ML
.1-.4 INJECTION, SOLUTION INTRAMUSCULAR; INTRAVENOUS; SUBCUTANEOUS
Status: DISCONTINUED | OUTPATIENT
Start: 2019-03-11 | End: 2019-03-11 | Stop reason: HOSPADM

## 2019-03-11 RX ORDER — VERAPAMIL HYDROCHLORIDE 2.5 MG/ML
INJECTION, SOLUTION INTRAVENOUS
Status: DISCONTINUED | OUTPATIENT
Start: 2019-03-11 | End: 2019-03-11 | Stop reason: HOSPADM

## 2019-03-11 RX ORDER — ASPIRIN 81 MG/1
81 TABLET ORAL DAILY
Status: DISCONTINUED | OUTPATIENT
Start: 2019-03-11 | End: 2019-03-11

## 2019-03-11 RX ORDER — NITROGLYCERIN 0.4 MG/1
TABLET SUBLINGUAL
Qty: 25 TABLET | Refills: 3 | Status: SHIPPED | OUTPATIENT
Start: 2019-03-11

## 2019-03-11 RX ORDER — DOBUTAMINE HYDROCHLORIDE 200 MG/100ML
5-40 INJECTION INTRAVENOUS CONTINUOUS PRN
Status: DISCONTINUED | OUTPATIENT
Start: 2019-03-11 | End: 2019-03-11 | Stop reason: HOSPADM

## 2019-03-11 RX ORDER — FLUMAZENIL 0.1 MG/ML
0.2 INJECTION, SOLUTION INTRAVENOUS
Status: DISCONTINUED | OUTPATIENT
Start: 2019-03-11 | End: 2019-03-11 | Stop reason: HOSPADM

## 2019-03-11 RX ORDER — SODIUM CHLORIDE 9 MG/ML
INJECTION, SOLUTION INTRAVENOUS CONTINUOUS
Status: DISCONTINUED | OUTPATIENT
Start: 2019-03-11 | End: 2019-03-11 | Stop reason: HOSPADM

## 2019-03-11 RX ORDER — ARGATROBAN 1 MG/ML
150 INJECTION, SOLUTION INTRAVENOUS
Status: DISCONTINUED | OUTPATIENT
Start: 2019-03-11 | End: 2019-03-11 | Stop reason: HOSPADM

## 2019-03-11 RX ORDER — ATORVASTATIN CALCIUM 40 MG/1
40 TABLET, FILM COATED ORAL DAILY
Status: DISCONTINUED | OUTPATIENT
Start: 2019-03-11 | End: 2019-03-11 | Stop reason: HOSPADM

## 2019-03-11 RX ORDER — FENTANYL CITRATE 50 UG/ML
INJECTION, SOLUTION INTRAMUSCULAR; INTRAVENOUS
Status: DISCONTINUED | OUTPATIENT
Start: 2019-03-11 | End: 2019-03-11 | Stop reason: HOSPADM

## 2019-03-11 RX ORDER — NOREPINEPHRINE BITARTRATE/D5W 16MG/250ML
.03-.4 PLASTIC BAG, INJECTION (ML) INTRAVENOUS CONTINUOUS PRN
Status: DISCONTINUED | OUTPATIENT
Start: 2019-03-11 | End: 2019-03-11 | Stop reason: HOSPADM

## 2019-03-11 RX ORDER — LISINOPRIL AND HYDROCHLOROTHIAZIDE 12.5; 2 MG/1; MG/1
1 TABLET ORAL DAILY
Status: DISCONTINUED | OUTPATIENT
Start: 2019-03-11 | End: 2019-03-11 | Stop reason: HOSPADM

## 2019-03-11 RX ORDER — HYDROCODONE BITARTRATE AND ACETAMINOPHEN 5; 325 MG/1; MG/1
1-2 TABLET ORAL EVERY 4 HOURS PRN
Status: DISCONTINUED | OUTPATIENT
Start: 2019-03-11 | End: 2019-03-11 | Stop reason: HOSPADM

## 2019-03-11 RX ORDER — EPTIFIBATIDE 2 MG/ML
2 INJECTION, SOLUTION INTRAVENOUS CONTINUOUS PRN
Status: DISCONTINUED | OUTPATIENT
Start: 2019-03-11 | End: 2019-03-11 | Stop reason: HOSPADM

## 2019-03-11 RX ORDER — EPTIFIBATIDE 2 MG/ML
180 INJECTION, SOLUTION INTRAVENOUS EVERY 10 MIN PRN
Status: DISCONTINUED | OUTPATIENT
Start: 2019-03-11 | End: 2019-03-11 | Stop reason: HOSPADM

## 2019-03-11 RX ORDER — ASPIRIN 81 MG/1
81 TABLET ORAL DAILY
Status: DISCONTINUED | OUTPATIENT
Start: 2019-03-11 | End: 2019-03-11 | Stop reason: HOSPADM

## 2019-03-11 RX ADMIN — FENTANYL CITRATE 50 MCG: 50 INJECTION, SOLUTION INTRAMUSCULAR; INTRAVENOUS at 08:51

## 2019-03-11 RX ADMIN — FENTANYL CITRATE 50 MCG: 50 INJECTION, SOLUTION INTRAMUSCULAR; INTRAVENOUS at 08:59

## 2019-03-11 RX ADMIN — MIDAZOLAM 1 MG: 1 INJECTION INTRAMUSCULAR; INTRAVENOUS at 08:59

## 2019-03-11 RX ADMIN — MIDAZOLAM 1 MG: 1 INJECTION INTRAMUSCULAR; INTRAVENOUS at 09:28

## 2019-03-11 RX ADMIN — MIDAZOLAM 1 MG: 1 INJECTION INTRAMUSCULAR; INTRAVENOUS at 09:12

## 2019-03-11 RX ADMIN — SODIUM CHLORIDE: 9 INJECTION, SOLUTION INTRAVENOUS at 07:57

## 2019-03-11 RX ADMIN — VERAPAMIL HYDROCHLORIDE 2.5 MG: 2.5 INJECTION, SOLUTION INTRAVENOUS at 09:18

## 2019-03-11 RX ADMIN — NITROGLYCERIN 300 MCG: 5 INJECTION, SOLUTION INTRAVENOUS at 09:38

## 2019-03-11 RX ADMIN — NITROGLYCERIN 200 MCG: 5 INJECTION, SOLUTION INTRAVENOUS at 09:18

## 2019-03-11 RX ADMIN — HEPARIN SODIUM 5000 UNITS: 1000 INJECTION, SOLUTION INTRAVENOUS; SUBCUTANEOUS at 09:19

## 2019-03-11 RX ADMIN — CLOPIDOGREL BISULFATE 600 MG: 75 TABLET ORAL at 09:27

## 2019-03-11 RX ADMIN — HEPARIN SODIUM 3000 UNITS: 1000 INJECTION, SOLUTION INTRAVENOUS; SUBCUTANEOUS at 09:24

## 2019-03-11 RX ADMIN — LIDOCAINE HYDROCHLORIDE 2 ML: 10 INJECTION, SOLUTION EPIDURAL; INFILTRATION; INTRACAUDAL; PERINEURAL at 09:12

## 2019-03-11 RX ADMIN — MIDAZOLAM 1 MG: 1 INJECTION INTRAMUSCULAR; INTRAVENOUS at 08:51

## 2019-03-11 ASSESSMENT — MIFFLIN-ST. JEOR: SCORE: 1706.2

## 2019-03-11 NOTE — DISCHARGE INSTRUCTIONS
Cardiac Angioplasty/Stent Discharge Instructions - Radial    After you go home:      Have an adult stay with you until tomorrow.    Drink extra fluids for 2 days.    You may resume your normal diet.    No smoking       For 24 hours - due to the sedation you received:    Relax and take it easy.    Do NOT make any important or legal decisions.    Do NOT drive or operate machines at home or at work.    Do NOT drink alcohol.    Care of Wrist Puncture Site:      For the first 24 hrs - check the puncture site every 1-2 hours while awake.    It is normal to have soreness at the puncture site and mild tingling in your hand for up to 3 days.    Remove the bandaid after 24 hours. If there is minor oozing, apply another bandaid and remove it after 12 hours.    You may shower tomorrow.  Do NOT take a bath, or use a hot tub or pool for at least 3 days. Do NOT scrub the site. Do not use lotion or powder near the puncture site.           Activity:          For 2 days:     do not use your hand or arm to support your weight (such as rising from a chair)     do not bend your wrist (such as lifting a garage door).    do not lift more than 5 pounds or exercise your arm (such as tennis, golf or bowling).    Do NOT do any heavy activity such as exercise, lifting, or straining.     Bleeding:      If you start bleeding from the site in your wrist, sit down and press firmly on/above the site for 10 minutes.     Once bleeding stops, keep arm still for 2 hours.     [facility] as soon as you can.       Call 911 right away if you have heavy bleeding or bleeding that does not stop.      Medicines:      If you are taking an antiplatelet medication such as Plavix, Brilinta or Effient, do not stop taking it until you talk to your cardiologist.        If you are on Metformin (Glucophage), do not restart it until you have blood tests (within 2 to 3 days after discharge).  After you have your blood drawn, you may restart the Metformin.     Take  your medications, including blood thinners, unless your provider tells you not to.  If you take Coumadin (Warfarin), have your INR checked by your provider in  3-5 days. Call your clinic to schedule this.    If you have stopped any medicines, check with your provider about when to restart them.    Follow Up Appointments:      Follow up with [facility] Nurse Practitioner at [facility] of patient preference in 7-10 days.    Cardiac Rehab will contact you for follow up care.    Call the clinic if:      You have a large or growing hard lump around the site.    The site is red, swollen, hot or tender.    Blood or fluid is draining from the site.    You have chills or a fever greater than 101 F (38 C).    Your arm feels numb, cool or changes color.    You have hives, a rash or unusual itching.    Any questions or concerns.    Other Instructions:      If you received a stent - carry your stent card with you at all times.      [facility] at [place]:    [phone] [facility] (7 days a week)

## 2019-03-11 NOTE — Clinical Note
The DP pulses are 1+ bilaterally. The PT pulses are 1+ bilaterally. The radial pulses are 1+ bilaterally.

## 2019-03-11 NOTE — PROGRESS NOTES
Pt up and walked hallway > restroom  > + urination   Radial site remains dry and intact with + distal pulse  VSS  Denies any CP -SOB   IV Dc'd   ate - taking fluids   Pt ready for discharge home with

## 2019-03-11 NOTE — Clinical Note
Stent deployed in the proximal left anterior descending. Max pressure = 16 jaden. Total duration = 30 seconds.

## 2019-03-11 NOTE — IP AVS SNAPSHOT
"                  MRN:[id number]                      After Visit Summary   3/11/2019    Heather Bauer    MRN: [id number]           Visit Information        Department      3/11/2019  6:39 AM [facility]          Review of your medicines      UNREVIEWED medicines. Ask your doctor about these medicines       Dose / Directions   aspirin 81 MG EC tablet      Dose:  81 mg  Take 81 mg by mouth daily  Refills:  0     atorvastatin 40 MG tablet  Commonly known as:  LIPITOR  Used for:  Abnormal stress test, BORJA (dyspnea on exertion)      Dose:  40 mg  Take 1 tablet (40 mg) by mouth daily  Quantity:  90 tablet  Refills:  3     lisinopril-hydrochlorothiazide 20-12.5 MG tablet  Commonly known as:  PRINZIDE/ZESTORETIC  Used for:  Essential hypertension, benign      Dose:  1 tablet  Take 1 tablet by mouth daily  Quantity:  90 tablet  Refills:  3     metoprolol succinate ER 25 MG 24 hr tablet  Commonly known as:  TOPROL XL  Used for:  Abnormal stress test      Dose:  12.5 mg  Take 0.5 tablets (12.5 mg) by mouth daily  Quantity:  45 tablet  Refills:  3     Multiple Minerals-Vitamins Tabs      Refills:  0        START taking       Dose / Directions   clopidogrel 75 MG tablet  Commonly known as:  PLAVIX  Used for:  Postsurgical percutaneous transluminal coronary angioplasty status, Coronary artery disease of native artery of native heart with stable angina pectoris (H)      Dose:  75 mg  Start taking on:  3/12/2019  Take 1 tablet (75 mg) by mouth daily  Quantity:  90 tablet  Refills:  3     nitroGLYcerin 0.4 MG sublingual tablet  Commonly known as:  NITROSTAT  Used for:  Postsurgical percutaneous transluminal coronary angioplasty status, Coronary artery disease of native artery of native heart with stable angina pectoris (H)      One tablet under the tongue every 5 minutes if needed for chest pain. May repeat every 5 minutes for a maximum of 3 doses in 15 minutes\"  Quantity:  25 tablet  Refills:  3         "   Where to get your medicines      Some of these will need a paper prescription and others can be bought over the counter. Ask your nurse if you have questions.    Bring a paper prescription for each of these medications  clopidogrel 75 MG tablet  nitroGLYcerin 0.4 MG sublingual tablet           Prescriptions were sent or printed at these locations (2 Prescriptions)            [facility] [address] [facility] [employer] - [place], [place] - [address]   [address] [facility], [facility] [address]    Telephone:  [phone]   Fax:  [phone]   Hours:                  Printed at Department/Unit printer (2 of 2)         clopidogrel (PLAVIX) 75 MG tablet               nitroGLYcerin (NITROSTAT) 0.4 MG sublingual tablet                Protect others around you: Learn how to safely use, store and throw away your medicines at www.disposemymeds.org.       Follow-ups after your visit       Additional Services     CARDIAC REHAB REFERRAL      Please be aware that coverage of these services is subject to the terms and limitations of your health insurance plan.  Call member services at your health plan with any benefit or coverage questions.     Order is sent electronically to central rehab scheduling. Call [phone] if you haven't been contacted regarding these appointments within 2 business days of discharge.           Your next 10 appointments already scheduled    Mar 18, 2019 10:30 AM CDT  Return Visit with Blanca Ash PA-C  [facility] ([facility]) [address] [address] [address] [phone]      Care Instructions       After Care Instructions     Discharge Instructions - IF on Metformin (Glucophage or Glucovance) or Metformin containing medications      IF on Metformin (Glucophage or Glucovance) or Metformin containing medications , schedule a Basic Metabolic Panel at [facility] or Primary Clinic in 48 - 72 hours post procedure and PRIOR TO resuming the  Metformin or Metformin containing medications.  Hold Metformin (Glucophage or Glucovance) or Metformin containing medications until after the Basic Metabolic Panel on the 2nd or 3rd day following the procedure.  May resume after blood draw is complete.           Further instructions from your care team       Cardiac Angioplasty/Stent Discharge Instructions - Radial    After you go home:      Have an adult stay with you until tomorrow.    Drink extra fluids for 2 days.    You may resume your normal diet.    No smoking       For 24 hours - due to the sedation you received:    Relax and take it easy.    Do NOT make any important or legal decisions.    Do NOT drive or operate machines at home or at work.    Do NOT drink alcohol.    Care of Wrist Puncture Site:      For the first 24 hrs - check the puncture site every 1-2 hours while awake.    It is normal to have soreness at the puncture site and mild tingling in your hand for up to 3 days.    Remove the bandaid after 24 hours. If there is minor oozing, apply another bandaid and remove it after 12 hours.    You may shower tomorrow.  Do NOT take a bath, or use a hot tub or pool for at least 3 days. Do NOT scrub the site. Do not use lotion or powder near the puncture site.           Activity:          For 2 days:     do not use your hand or arm to support your weight (such as rising from a chair)     do not bend your wrist (such as lifting a garage door).    do not lift more than 5 pounds or exercise your arm (such as tennis, golf or bowling).    Do NOT do any heavy activity such as exercise, lifting, or straining.     Bleeding:      If you start bleeding from the site in your wrist, sit down and press firmly on/above the site for 10 minutes.     Once bleeding stops, keep arm still for 2 hours.     Call [facility] as soon as you can.       Call 911 right away if you have heavy bleeding or bleeding that does not stop.      Medicines:      If you are taking an antiplatelet  medication such as Plavix, Brilinta or Effient, do not stop taking it until you talk to your cardiologist.        If you are on Metformin (Glucophage), do not restart it until you have blood tests (within 2 to 3 days after discharge).  After you have your blood drawn, you may restart the Metformin.     Take your medications, including blood thinners, unless your provider tells you not to.  If you take Coumadin (Warfarin), have your INR checked by your provider in  3-5 days. Call your clinic to schedule this.    If you have stopped any medicines, check with your provider about when to restart them.    Follow Up Appointments:      Follow up with [facility] Nurse Practitioner at [facility] of patient preference in 7-10 days.    Cardiac Rehab will contact you for follow up care.    Call the clinic if:      You have a large or growing hard lump around the site.    The site is red, swollen, hot or tender.    Blood or fluid is draining from the site.    You have chills or a fever greater than 101 F (38 C).    Your arm feels numb, cool or changes color.    You have hives, a rash or unusual itching.    Any questions or concerns.    Other Instructions:      If you received a stent - carry your stent card with you at all times.      [facility] at [place]:    [phone] [facility] (7 days a week)          Additional Information About Your Visit       MyChart Information    [facility] gives you secure access to your electronic health record. If you see a primary care provider, you can also send messages to your care team and make appointments. If you have questions, please call your primary care clinic.  If you do not have a primary care provider, please call [phone] and they will assist you.       Care EveryWhere ID    This is your Care EveryWhere ID. This could be used by other organizations to access your [place] medical records  [id number]       Your Vitals Were     Blood Pressure   99/65  "         Pulse   71          Temperature   98  F (36.7  C) (Oral)          Respirations   18          Height   1.727 m (5' 8\")             Weight   109.8 kg (241 lb 16 oz)    Last Period   07/07/2008    Pulse Oximetry   95%    BMI (Body Mass Index)   36.80 kg/m           Primary Care Provider Office Phone # Fax #    Sylvia Bird [phone]      Equal Access to Services    [facility] : Hadii aad ku hadasho Soomaali, waaxda luqadaha, qaybta kaalmada adeegyada, waxay ismael ishan adesigrid candice laRickiepool . So [facility] [phone].    ATENCIÓN: Si habla hina, tiene a torres disposición servicios gratuitos de asistencia lingüística. Llame al [phone].    We comply with applicable federal civil rights laws and [place] laws. We do not discriminate on the basis of race, color, national origin, age, disability, sex, sexual orientation, or gender identity.           Thank you!    Thank you for choosing [facility] for your care. Our goal is always to provide you with excellent care. Hearing back from our patients is one way we can continue to improve our services. Please take a few minutes to complete the written survey that you may receive in the mail after you visit with us. Thank you!            Medication List      Medications          Morning Afternoon Evening Bedtime As Needed    clopidogrel 75 MG tablet  Also known as:  PLAVIX  Start taking on:  3/12/2019  INSTRUCTIONS:  Take 1 tablet (75 mg) by mouth daily  LAST TAKEN:  600 mg on 3/11/2019  9:27 AM                     nitroGLYcerin 0.4 MG sublingual tablet  Also known as:  NITROSTAT  INSTRUCTIONS:  One tablet under the tongue every 5 minutes if needed for chest pain. May repeat every 5 minutes for a maximum of 3 doses in 15 minutes\"  LAST TAKEN:  Ask your nurse or doctor                       ASK your doctor about these medications          Morning Afternoon Evening Bedtime As Needed    aspirin 81 MG EC tablet  INSTRUCTIONS:  Take 81 mg by mouth " daily                     atorvastatin 40 MG tablet  Also known as:  LIPITOR  INSTRUCTIONS:  Take 1 tablet (40 mg) by mouth daily                     lisinopril-hydrochlorothiazide 20-12.5 MG tablet  Also known as:  PRINZIDE/ZESTORETIC  INSTRUCTIONS:  Take 1 tablet by mouth daily  Doctor's comments:  Profile Rx: patient will contact pharmacy when needed                     metoprolol succinate ER 25 MG 24 hr tablet  Also known as:  TOPROL XL  INSTRUCTIONS:  Take 0.5 tablets (12.5 mg) by mouth daily                     Multiple Minerals-Vitamins Tabs

## 2019-03-11 NOTE — Clinical Note
The first balloon was inserted into the left anterior descending and proximal left anterior descending.  Max pressure = 12 jaden. Total duration = 30 seconds.

## 2019-03-11 NOTE — CONSULTS
Consult Date:  03/11/2019      REFERRING CARDIOLOGIST:  Adolph Dave MD      REASON FOR CONSULTATION:  Aortic stenosis.      HISTORY OF PRESENT ILLNESS:  Ms. Bauer is a very pleasant 64-year-old previously healthy female who was recently seen by Dr. Dave.  She has a history of aortic stenosis which appeared to be in the moderate range with a mean gradient of 33 mmHg and a calculated aortic valve area of 0.9 cm2 on the most recent echocardiogram.  She was having new onset symptoms; however, with dyspnea on exertion.  This was felt to be disproportionate to the degree of aortic stenosis based on echocardiogram.  A stress echo was subsequently performed to rule out any evidence of ischemic heart disease.  This demonstrated ST depression in the inferior and lateral leads consistent with ischemia in the anterior wall.  A coronary angiogram was performed today as an outpatient and demonstrated severe proximal LAD disease that was successfully stented with a drug-eluting stent.  The patient was initially scheduled to see me in clinic this afternoon to evaluate her for potential aortic valve surgery.      PAST MEDICAL HISTORY:  Aortic stenosis as described above, hypertension.      PAST SURGICAL HISTORY:  Appendectomy.      ALLERGIES:  NO KNOWN DRUG ALLERGIES.      CURRENT MEDICATIONS:  Reviewed in her Epic chart.      FAMILY HISTORY:  Significant for coronary artery disease.      SOCIAL HISTORY:  She is  with 2 children.  She is a former smoker but quit 20 years ago.  She drinks alcohol lightly.      REVIEW OF SYSTEMS:  As per HPI.  All other 10-point review of systems are completed and were otherwise negative unless stated above.      PHYSICAL EXAMINATION:   VITAL SIGNS:  Stable.   GENERAL:  She appears well, in no acute distress.   HEENT:  Within normal limits.   NECK:  Supple, no lymphadenopathy, no thyromegaly.   CARDIOVASCULAR:  Regular rate and rhythm, normal S1 and S2.  Grade 2/6 systolic murmur at  the right upper sternal border.   LUNGS:  Clear bilaterally.   ABDOMEN:  Soft, nontender, nondistended.   EXTREMITIES:  Negative for edema, cyanosis or clubbing.   NEUROLOGIC:  She is A and O x3, no focal deficits.      IMPRESSION AND PLAN:  Ms. Ashton is a very pleasant 64-year-old obese female with recent dyspnea on exertion and aortic stenosis in the moderate range based on echocardiogram with a mean gradient of 33 mmHg.  Coronary angiogram was performed today that demonstrated severe proximal LAD disease that was successfully stented.  This is probably the likely culprit of her symptoms given that her aortic stenosis does not appear to be in the severe range.  I discussed this with Dr. Dave.  The plan will be to follow up with her in 6 months in Cardiology Clinic and hope that her symptoms resolve in the meantime.  We will be happy to see her again in the future when her aortic stenosis progresses to the severe range or if we think that she is still having symptoms from her aortic stenosis.  It was a pleasure to meet Ms. Ashton today.         DOMINGA SHETH MD             D: 2019   T: 2019   MT: MAZIN      Name:     ABBY ASHTON   MRN:      [id number]        Account:       [id number]   :      1955           Consult Date:  2019      Document: [id number]

## 2019-03-11 NOTE — IP AVS SNAPSHOT
[place] [address] [place] [address]  Phone:  [phone]                                    After Visit Summary   3/11/2019    Heather Bauer    MRN: [id number]           After Visit Summary Signature Page    I have received my discharge instructions, and my questions have been answered. I have discussed any challenges I see with this plan with the nurse or doctor.    ..........................................................................................................................................  Patient/Patient Representative Signature      ..........................................................................................................................................  Patient Representative Print Name and Relationship to Patient    ..................................................               ................................................  Date                                   Time    ..........................................................................................................................................  Reviewed by Signature/Title    ...................................................              ..............................................  Date                                               Time          22EPIC Rev 08/18

## 2019-03-11 NOTE — PROGRESS NOTES
Care Suites Arrival    Reason for Visit: left heart cath    A/O. Denies pain. Labs WNL. IV flds infusing. Contrast D/C instructions reviewed with pt with verbal understanding received. Copy given to pt. All questions & concerns addressed.       Pt resting in bed, denies additional needs at this time, call light within reach. Family at bedside.   will stay with pt overnight.      0830 Dr. Dave at bedside to speak with pt &   Consent signed at this time.    0842Pt to cath lab at this time.

## 2019-03-11 NOTE — PROGRESS NOTES
0900Pt returned from Cath Lab. TR band intact to left wrist.  No oozing or hematoma noted. Area soft & flat.Left arm elevated on pillows. Pt denies pain. Pt instructed on activity restrictions with left wrist. Verbal understanding received from pt.    0910 Pt's family at bedside. Detailed update given.     0930 Pt taking diet & flds well. No complaints.    1100 Air released from TR band per protocol.      1130 Discharge teaching & instructions given to both pt &  w/ verbal understanding received. All questions & concerns addressed.

## 2019-03-12 ENCOUNTER — TELEPHONE (OUTPATIENT)
Dept: CARDIOLOGY | Facility: CLINIC | Age: 64
End: 2019-03-12

## 2019-03-12 LAB
INTERPRETATION ECG - MUSE: NORMAL
INTERPRETATION ECG - MUSE: NORMAL

## 2019-03-12 NOTE — TELEPHONE ENCOUNTER
Spoke with patient post discharge from care suites after coronary angiography.     Intervention: Coronary angiogram with PCI     Access Site: left radial.    Instructions:   Patient may remove the Band-Aid after 24 hours, but if there is minor oozing apply another and may remove second Band-Aid after 12 hours. Also, no heavy exercise for 2 days, do not take a bath, or use a hot tub or pool for at least 3 days but may shower.     Reviewed with patient care of wrist site and that is it normal to have soreness at the puncture site and mild tingling in the hand for up to 3 days. For 2 days use hand to support body weight or bend wrist, do not lift > 5 lbs or exercise the arm. If the wrist site startsto bleed, sit down and place firm pressure at the site with your fingers for 10 minutes. If the bleeding stops, sit still and keep wrist straight for 2 hours.     Instructed patient to call 911 right away if the bleeding is heavy or does not stop. Call the clinic if there is a large or growing lump around the site, the site is red, swollen, hot, or tender, have fever >101 degrees F or chills, your arm or leg feels numb or cool, or hives, a rash, or unusual itching, shortness of breath, or chest discomfort.     Heart Follow Up: 03/21/2019 with martell Ash     After hours contact number [phone] option 2.

## 2019-03-13 LAB
ABO + RH BLD: NORMAL
ABO + RH BLD: NORMAL
BLD GP AB SCN SERPL QL: NORMAL
BLOOD BANK CMNT PATIENT-IMP: NORMAL
BLOOD BANK CMNT PATIENT-IMP: NORMAL
SPECIMEN EXP DATE BLD: NORMAL

## 2019-03-21 ENCOUNTER — OFFICE VISIT (OUTPATIENT)
Dept: CARDIOLOGY | Facility: CLINIC | Age: 64
End: 2019-03-21
Payer: COMMERCIAL

## 2019-03-21 VITALS
WEIGHT: 240.1 LBS | HEART RATE: 68 BPM | BODY MASS INDEX: 36.39 KG/M2 | SYSTOLIC BLOOD PRESSURE: 108 MMHG | HEIGHT: 68 IN | DIASTOLIC BLOOD PRESSURE: 72 MMHG

## 2019-03-21 DIAGNOSIS — I35.0 AORTIC VALVE STENOSIS, ETIOLOGY OF CARDIAC VALVE DISEASE UNSPECIFIED: ICD-10-CM

## 2019-03-21 DIAGNOSIS — I25.10 CORONARY ARTERY DISEASE INVOLVING NATIVE CORONARY ARTERY OF NATIVE HEART WITHOUT ANGINA PECTORIS: Primary | ICD-10-CM

## 2019-03-21 DIAGNOSIS — R06.09 DOE (DYSPNEA ON EXERTION): ICD-10-CM

## 2019-03-21 PROCEDURE — 99214 OFFICE O/P EST MOD 30 MIN: CPT | Performed by: PHYSICIAN ASSISTANT

## 2019-03-21 ASSESSMENT — MIFFLIN-ST. JEOR: SCORE: 1697.59

## 2019-03-21 NOTE — PROGRESS NOTES
"Please see separate dictation for HPI, PHYSICAL EXAM AND IMPRESSION/PLAN.    CURRENT MEDICATIONS:  Current Outpatient Medications   Medication Sig Dispense Refill     aspirin 81 MG EC tablet Take 81 mg by mouth daily       atorvastatin (LIPITOR) 40 MG tablet Take 1 tablet (40 mg) by mouth daily 90 tablet 3     clopidogrel (PLAVIX) 75 MG tablet Take 1 tablet (75 mg) by mouth daily 90 tablet 3     lisinopril-hydrochlorothiazide (PRINZIDE/ZESTORETIC) 20-12.5 MG tablet Take 1 tablet by mouth daily 90 tablet 3     metoprolol succinate ER (TOPROL XL) 25 MG 24 hr tablet Take 0.5 tablets (12.5 mg) by mouth daily 45 tablet 3     MULTIPLE MINERALS-VITAMINS OR TABS        nitroGLYcerin (NITROSTAT) 0.4 MG sublingual tablet One tablet under the tongue every 5 minutes if needed for chest pain. May repeat every 5 minutes for a maximum of 3 doses in 15 minutes\" 25 tablet 3       ALLERGIES:     Allergies   Allergen Reactions     No Known Drug Allergies        PAST MEDICAL HISTORY:  Past Medical History:   Diagnosis Date     Aortic valve stenosis      Essential hypertension, benign      Fracture, tibia, shaft      History of colposcopy with cervical biopsy 2/22/11    AMRITA I     Papanicolaou smear of vagina with atypical squamous cells cannot exclude high grade squamous intraepithelial lesion (ASC-H) 1/31/11       PAST SURGICAL HISTORY:  Past Surgical History:   Procedure Laterality Date     C APPENDECTOMY       CV CORONARY ANGIOGRAM N/A 3/11/2019    Procedure: Coronary Angiogram;  Surgeon: Adolph Dave MD;  Location: [facility] CARDIAC CATH LAB     CV PERCUTANEOUS CORONARY INTERVENTION N/A 3/11/2019    Procedure: Percutaneous Coronary Intervention;  Surgeon: Adolph Dave MD;  Location: [facility] CARDIAC CATH LAB       SOCIAL HISTORY:  Social History     Socioeconomic History     Marital status:      Spouse name: None     Number of children: 2     Years of education: None     Highest education level: None "   Occupational History     Employer: [employer]   Social Needs     Financial resource strain: None     Food insecurity:     Worry: None     Inability: None     Transportation needs:     Medical: None     Non-medical: None   Tobacco Use     Smoking status: Former Smoker     Smokeless tobacco: Never Used     Tobacco comment: quit 20 years ago   Substance and Sexual Activity     Alcohol use: Yes     Comment: wine every week     Drug use: No     Sexual activity: Yes     Birth control/protection: Condom   Lifestyle     Physical activity:     Days per week: None     Minutes per session: None     Stress: None   Relationships     Social connections:     Talks on phone: None     Gets together: None     Attends Anabaptism service: None     Active member of club or organization: None     Attends meetings of clubs or organizations: None     Relationship status: None     Intimate partner violence:     Fear of current or ex partner: None     Emotionally abused: None     Physically abused: None     Forced sexual activity: None   Other Topics Concern      Service Not Asked     Blood Transfusions Not Asked     Caffeine Concern Not Asked     Occupational Exposure Not Asked     Hobby Hazards Not Asked     Sleep Concern Not Asked     Stress Concern Not Asked     Weight Concern Not Asked     Special Diet Not Asked     Back Care Not Asked     Exercise Yes     Bike Helmet Not Asked     Seat Belt Yes     Self-Exams Not Asked     Parent/sibling w/ CABG, MI or angioplasty before 65F 55M? Not Asked   Social History Narrative     None       FAMILY HISTORY:  Family History   Problem Relation Age of Onset     C.A.D. Mother         early 60's     Lipids Mother      Heart Disease Mother 68        heart bypass surgery     Hypertension Mother      Thyroid Disease Mother      Heart Disease Father         valvular disease     Thyroid Disease Paternal Aunt      Heart Disease Maternal Grandmother         heart attack       Review of  Systems:  Skin:  Positive for lumps or bumps red pino and bump on lower left ankle - stings occ   Eyes:  Positive for glasses    ENT:  Positive for   itching in both ears  Respiratory:  Positive for dyspnea on exertion;dyspnea at rest dry throat occ   Cardiovascular:    Positive for;heaviness chest heaviness/tightness with exertion - has improved  Gastroenterology: Negative      Genitourinary:  Negative      Musculoskeletal:  Positive for nocturnal cramping    Neurologic:  Positive for numbness or tingling of hands    Psychiatric:  Negative      Heme/Lymph/Imm:  Negative      Endocrine:  Negative         Reviewed. Remainder of the note dictated.    Blanca Ash PA-C

## 2019-03-21 NOTE — PROGRESS NOTES
Service Date: 03/21/2019      HISTORY OF PRESENT ILLNESS:  Ms. Bauer is a pleasant 64-year-old female who presents to the office today for followup after a recent cardiac catheterization.      She was referred to the cardiology office about a month ago due to an echocardiogram showing moderate to severe aortic stenosis and with symptoms of dyspnea on exertion.  Her personal history included hypertension, aortic stenosis, obesity and prior tobacco use.  She also has a family history of coronary artery disease.  To further evaluate her dyspnea on exertion, Dr. Dave recommended that she undergo a stress echocardiogram.  This was significantly abnormal and suggested LAD ischemia.  She was referred on for cardiac catheterization.  Her coronary angiogram performed on 03/11 showed a severe lesion in the proximal LAD which was treated with drug-eluting stent placement x1.  There was no other significant coronary disease.  She was started on dual antiplatelet therapy.  She also recently has been started on high-intensity statin therapy as well as a low dose of beta blocker.  The same day as her coronary angiogram she was evaluated by Dr. Palmer of Cardiovascular Surgery.  He felt that her echocardiogram findings were most consistent with moderate aortic stenosis and that she was not a surgical candidate at this time.  He recommended re-evaluation of the valve in approximately 6 months.    Since her stent was placed, the patient does think that she has had some improvement in her dyspnea on exertion.  She still has not done a ton of activity, but is interested in increasing her exercise routine.  She is tolerating her medical therapy without difficulty.  She does not have any new questions or concerns at this time.      CURRENT CARDIAC MEDICATIONS:   1.  Aspirin 81 mg daily.   2.  Atorvastatin 40 mg daily.   3.  Clopidogrel 75 mg daily.   4.  Lisinopril/hydrochlorothiazide 20/12.5 mg daily.   5.  Toprol-XL 25 mg half  tablet daily.   6.  Sublingual nitro p.r.n.      The remainder of her medications, allergies and review of systems were reviewed and as are documented separately.      PHYSICAL EXAMINATION:   GENERAL:  The patient is a pleasant 64-year-old female who appears her stated age.  She is in no apparent distress.   VITAL SIGNS:  Her blood pressure is 108/72, pulse is 60, weight is 240 pounds which is stable.     PULMONARY:  Breathing is nonlabored and lungs are clear to auscultation.   CARDIAC:  Reveals a regular rate and rhythm.  She does have a 1/6 systolic ejection murmur heard best at the upper left sternal border.   EXTREMITIES:  Lower extremities show no evidence of edema.   NEUROLOGIC:  Alert and oriented.      ASSESSMENT AND PLAN:  Ms. Bauer is a pleasant 64-year-old female with a history of aortic stenosis, HTN, and coronary artery disease with recent stent placement to the proximal LAD.  There was minimal disease in the remainder of the coronary tree.  Currently, it is felt that her aortic valve stenosis is most likely moderate.  She recently had been noticing dyspnea on exertion.  This does seem somewhat improved after her recent coronary intervention.  I recommended that she continue to participate in routine exercise program.  I will plan on seeing her back in the office in a couple of months to see how she is progressing.      Again, I will plan to see her back in the office in a couple of months.  I have asked her to contact me in the interim with any questions, concerns or progressive symptoms.  I also have placed orders for a tentative repeat echocardiogram and followup with Dr. Dave in approximately 6 months.      Thank you for allowing us to participate in the care of this pleasant patient.       cc:   Sylvia Bird MD   [facility]    [address]    [address]   [place]  [address]         SYDNI PICKETT PA-C             D: 03/21/2019   T: 03/21/2019   MT: RADHA       Name:     ABBY ASHTON   MRN:      [id number]        Account:      [id number]   :      1955           Service Date: 2019      Document: [id number]

## 2019-03-21 NOTE — LETTER
3/21/2019      Sylvia Bird MD  [address] [facility] [address]  [facility] [address]      RE: Heather Mcadamsromel       Dear Colleague,    I had the pleasure of seeing Heather Bauer in the [facility].    Service Date: 03/21/2019      HISTORY OF PRESENT ILLNESS:  Ms. Bauer is a pleasant 64-year-old female who presents to the office today for followup after a recent cardiac catheterization.      She was referred to the cardiology office about a month ago due to an echocardiogram showing moderate to severe aortic stenosis and with symptoms of dyspnea on exertion.  Her personal history included hypertension, aortic stenosis, obesity and prior tobacco use.  She also has a family history of coronary artery disease.  To further evaluate her dyspnea on exertion, Dr. Dave recommended that she undergo a stress echocardiogram.  This was significantly abnormal and suggested LAD ischemia.  She was referred on for cardiac catheterization.  Her coronary angiogram performed on 03/11 showed a severe lesion in the proximal LAD which was treated with drug-eluting stent placement x1.  There was no other significant coronary disease.  She was started on dual antiplatelet therapy.  She also recently has been started on high-intensity statin therapy as well as a low dose of beta blocker.  The same day as her coronary angiogram she was evaluated by Dr. Palmer of Cardiovascular Surgery.  He felt that her echocardiogram findings were most consistent with moderate aortic stenosis and that she was not a surgical candidate at this time.  He recommended re-evaluation of the valve in approximately 6 months.    Since her stent was placed, the patient does think that she has had some improvement in her dyspnea on exertion.  She still has not done a ton of activity, but is interested in increasing her exercise routine.  She is tolerating her medical therapy without difficulty.  She does not have any new questions or  concerns at this time.      CURRENT CARDIAC MEDICATIONS:   1.  Aspirin 81 mg daily.   2.  Atorvastatin 40 mg daily.   3.  Clopidogrel 75 mg daily.   4.  Lisinopril/hydrochlorothiazide 20/12.5 mg daily.   5.  Toprol-XL 25 mg half tablet daily.   6.  Sublingual nitro p.r.n.      The remainder of her medications, allergies and review of systems were reviewed and as are documented separately.      PHYSICAL EXAMINATION:   GENERAL:  The patient is a pleasant 64-year-old female who appears her stated age.  She is in no apparent distress.   VITAL SIGNS:  Her blood pressure is 108/72, pulse is 60, weight is 240 pounds which is stable.     PULMONARY:  Breathing is nonlabored and lungs are clear to auscultation.   CARDIAC:  Reveals a regular rate and rhythm.  She does have a 1/6 systolic ejection murmur heard best at the upper left sternal border.   EXTREMITIES:  Lower extremities show no evidence of edema.   NEUROLOGIC:  Alert and oriented.      ASSESSMENT AND PLAN:  Ms. Bauer is a pleasant 64-year-old female with a history of aortic stenosis, HTN, and coronary artery disease with recent stent placement to the proximal LAD.  There was minimal disease in the remainder of the coronary tree.  Currently, it is felt that her aortic valve stenosis is most likely moderate.  She recently had been noticing dyspnea on exertion.  This does seem somewhat improved after her recent coronary intervention.  I recommended that she continue to participate in routine exercise program.  I will plan on seeing her back in the office in a couple of months to see how she is progressing.      Again, I will plan to see her back in the office in a couple of months.  I have asked her to contact me in the interim with any questions, concerns or progressive symptoms.  I also have placed orders for a tentative repeat echocardiogram and followup with Dr. Dave in approximately 6 months.      Thank you for allowing us to participate in the care of this  "pleasant patient.       cc:   Sylvia Bird MD   [facility]    [address]    [address]   [place]  [address]         BLANCA PICKETT PA-C             D: 2019   T: 2019   MT: RADHA      Name:     ABBY ASHTON   MRN:      [id number]        Account:      [id number]   :      1955           Service Date: 2019      Document: [id number]           Outpatient Encounter Medications as of 3/21/2019   Medication Sig Dispense Refill     aspirin 81 MG EC tablet Take 81 mg by mouth daily       atorvastatin (LIPITOR) 40 MG tablet Take 1 tablet (40 mg) by mouth daily 90 tablet 3     clopidogrel (PLAVIX) 75 MG tablet Take 1 tablet (75 mg) by mouth daily 90 tablet 3     lisinopril-hydrochlorothiazide (PRINZIDE/ZESTORETIC) 20-12.5 MG tablet Take 1 tablet by mouth daily 90 tablet 3     metoprolol succinate ER (TOPROL XL) 25 MG 24 hr tablet Take 0.5 tablets (12.5 mg) by mouth daily 45 tablet 3     MULTIPLE MINERALS-VITAMINS OR TABS        nitroGLYcerin (NITROSTAT) 0.4 MG sublingual tablet One tablet under the tongue every 5 minutes if needed for chest pain. May repeat every 5 minutes for a maximum of 3 doses in 15 minutes\" 25 tablet 3     No facility-administered encounter medications on file as of 3/21/2019.        Again, thank you for allowing me to participate in the care of your patient.      Sincerely,    Blanca Pickett PA-C     [facility]    "

## 2019-03-21 NOTE — PATIENT INSTRUCTIONS
Thank you for your [facility] visit today. Your provider has recommended the following:  Medication Changes:  No changes  Recommendations:  Nothing new at this time.   Exercise routine with cardiac rehab or at home.  Follow-up:  See Blanca BOWLES for cardiology follow up in 2 months.  See Dr Dave in 6 months with a repeat echo prior    Reminder:  Please bring in your current medication list or your medication, over the counter supplements and vitamin bottles as we will review these at each office visit.

## 2019-03-21 NOTE — LETTER
"3/21/2019    Sylvia Bird MD  [address]  [facility] [address]    RE: Heather Bauer       Dear Colleague,    I had the pleasure of seeing Heather Bauer in the [facility].    Please see separate dictation for HPI, PHYSICAL EXAM AND IMPRESSION/PLAN.    CURRENT MEDICATIONS:  Current Outpatient Medications   Medication Sig Dispense Refill     aspirin 81 MG EC tablet Take 81 mg by mouth daily       atorvastatin (LIPITOR) 40 MG tablet Take 1 tablet (40 mg) by mouth daily 90 tablet 3     clopidogrel (PLAVIX) 75 MG tablet Take 1 tablet (75 mg) by mouth daily 90 tablet 3     lisinopril-hydrochlorothiazide (PRINZIDE/ZESTORETIC) 20-12.5 MG tablet Take 1 tablet by mouth daily 90 tablet 3     metoprolol succinate ER (TOPROL XL) 25 MG 24 hr tablet Take 0.5 tablets (12.5 mg) by mouth daily 45 tablet 3     MULTIPLE MINERALS-VITAMINS OR TABS        nitroGLYcerin (NITROSTAT) 0.4 MG sublingual tablet One tablet under the tongue every 5 minutes if needed for chest pain. May repeat every 5 minutes for a maximum of 3 doses in 15 minutes\" 25 tablet 3       ALLERGIES:     Allergies   Allergen Reactions     No Known Drug Allergies        PAST MEDICAL HISTORY:  Past Medical History:   Diagnosis Date     Aortic valve stenosis      Essential hypertension, benign      Fracture, tibia, shaft      History of colposcopy with cervical biopsy 2/22/11    AMRITA I     Papanicolaou smear of vagina with atypical squamous cells cannot exclude high grade squamous intraepithelial lesion (ASC-H) 1/31/11       PAST SURGICAL HISTORY:  Past Surgical History:   Procedure Laterality Date     C APPENDECTOMY       CV CORONARY ANGIOGRAM N/A 3/11/2019    Procedure: Coronary Angiogram;  Surgeon: Adolph Dave MD;  Location: [facility] CARDIAC CATH LAB     CV PERCUTANEOUS CORONARY INTERVENTION N/A 3/11/2019    Procedure: Percutaneous Coronary Intervention;  Surgeon: Adolph Dave MD;  Location: [facility] " CARDIAC CATH LAB       SOCIAL HISTORY:  Social History     Socioeconomic History     Marital status:      Spouse name: None     Number of children: 2     Years of education: None     Highest education level: None   Occupational History     Employer: [employer]   Social Needs     Financial resource strain: None     Food insecurity:     Worry: None     Inability: None     Transportation needs:     Medical: None     Non-medical: None   Tobacco Use     Smoking status: Former Smoker     Smokeless tobacco: Never Used     Tobacco comment: quit 20 years ago   Substance and Sexual Activity     Alcohol use: Yes     Comment: wine every week     Drug use: No     Sexual activity: Yes     Birth control/protection: Condom   Lifestyle     Physical activity:     Days per week: None     Minutes per session: None     Stress: None   Relationships     Social connections:     Talks on phone: None     Gets together: None     Attends [employer] service: None     Active member of club or organization: None     Attends meetings of clubs or organizations: None     Relationship status: None     Intimate partner violence:     Fear of current or ex partner: None     Emotionally abused: None     Physically abused: None     Forced sexual activity: None   Other Topics Concern      Service Not Asked     Blood Transfusions Not Asked     Caffeine Concern Not Asked     Occupational Exposure Not Asked     Hobby Hazards Not Asked     Sleep Concern Not Asked     Stress Concern Not Asked     Weight Concern Not Asked     Special Diet Not Asked     Back Care Not Asked     Exercise Yes     Bike Helmet Not Asked     Seat Belt Yes     Self-Exams Not Asked     Parent/sibling w/ CABG, MI or angioplasty before 65F 55M? Not Asked   Social History Narrative     None       FAMILY HISTORY:  Family History   Problem Relation Age of Onset     C.A.D. Mother         early 60's     Lipids Mother      Heart Disease Mother 68        heart bypass surgery      Hypertension Mother      Thyroid Disease Mother      Heart Disease Father         valvular disease     Thyroid Disease Paternal Aunt      Heart Disease Maternal Grandmother         heart attack       Review of Systems:  Skin:  Positive for lumps or bumps red pino and bump on lower left ankle - stings occ   Eyes:  Positive for glasses    ENT:  Positive for   itching in both ears  Respiratory:  Positive for dyspnea on exertion;dyspnea at rest dry throat occ   Cardiovascular:    Positive for;heaviness chest heaviness/tightness with exertion - has improved  Gastroenterology: Negative      Genitourinary:  Negative      Musculoskeletal:  Positive for nocturnal cramping    Neurologic:  Positive for numbness or tingling of hands    Psychiatric:  Negative      Heme/Lymph/Imm:  Negative      Endocrine:  Negative         Reviewed. Remainder of the note dictated.    Blanca Ash PA-C        Service Date: 03/21/2019      HISTORY OF PRESENT ILLNESS:  Ms. Bauer is a pleasant 64-year-old female who presents to the office today for followup after a recent cardiac catheterization.      She was referred to the cardiology office about a month ago due to an echocardiogram showing moderate to severe aortic stenosis and with symptoms of dyspnea on exertion.  Her personal history included hypertension, aortic stenosis, obesity and prior tobacco use.  She also has a family history of coronary artery disease.  To further evaluate her dyspnea on exertion, Dr. Dave recommended that she undergo a stress echocardiogram.  This was significantly abnormal and suggested LAD ischemia.  She was referred on for cardiac catheterization.  Her coronary angiogram performed on 03/11 showed a severe lesion in the proximal LAD which was treated with drug-eluting stent placement x1.  There was no other significant coronary disease.  She was started on dual antiplatelet therapy.  She also recently has been started on high-intensity statin  therapy as well as a low dose of beta blocker.  The same day as her coronary angiogram she was evaluated by Dr. Palmer of Cardiovascular Surgery.  He felt that her echocardiogram findings were most consistent with moderate aortic stenosis and that she was not a surgical candidate at this time.  He recommended re-evaluation of the valve in approximately 6 months.    Since her stent was placed, the patient does think that she has had some improvement in her dyspnea on exertion.  She still has not done a ton of activity, but is interested in increasing her exercise routine.  She is tolerating her medical therapy without difficulty.  She does not have any new questions or concerns at this time.      CURRENT CARDIAC MEDICATIONS:   1.  Aspirin 81 mg daily.   2.  Atorvastatin 40 mg daily.   3.  Clopidogrel 75 mg daily.   4.  Lisinopril/hydrochlorothiazide 20/12.5 mg daily.   5.  Toprol-XL 25 mg half tablet daily.   6.  Sublingual nitro p.r.n.      The remainder of her medications, allergies and review of systems were reviewed and as are documented separately.      PHYSICAL EXAMINATION:   GENERAL:  The patient is a pleasant 64-year-old female who appears her stated age.  She is in no apparent distress.   VITAL SIGNS:  Her blood pressure is 108/72, pulse is 60, weight is 240 pounds which is stable.     PULMONARY:  Breathing is nonlabored and lungs are clear to auscultation.   CARDIAC:  Reveals a regular rate and rhythm.  She does have a 1/6 systolic ejection murmur heard best at the upper left sternal border.   EXTREMITIES:  Lower extremities show no evidence of edema.   NEUROLOGIC:  Alert and oriented.      ASSESSMENT AND PLAN:  Ms. Bauer is a pleasant 64-year-old female with a history of aortic stenosis, HTN, and coronary artery disease with recent stent placement to the proximal LAD.  There was minimal disease in the remainder of the coronary tree.  Currently, it is felt that her aortic valve stenosis is most likely  moderate.  She recently had been noticing dyspnea on exertion.  This does seem somewhat improved after her recent coronary intervention.  I recommended that she continue to participate in routine exercise program.  I will plan on seeing her back in the office in a couple of months to see how she is progressing.      Again, I will plan to see her back in the office in a couple of months.  I have asked her to contact me in the interim with any questions, concerns or progressive symptoms.  I also have placed orders for a tentative repeat echocardiogram and followup with Dr. Dave in approximately 6 months.      Thank you for allowing us to participate in the care of this pleasant patient.       cc:   Sylvia Bird MD   [address] [facility] [address]   [place]  [address]         BLANCA PICKETT PA-C             D: 2019   T: 2019   MT: RADHA      Name:     ABBY ASHTON   MRN:      [id number]        Account:      [id number]   :      1955           Service Date: 2019      Document: [id number]        Thank you for allowing me to participate in the care of your patient.      Sincerely,     Blanca Pickett PA-C     [facility]    cc:   Sylvia Bird MD  [facility] [address]  [place] [address]

## 2019-03-28 ENCOUNTER — HOSPITAL ENCOUNTER (OUTPATIENT)
Dept: CARDIAC REHAB | Facility: CLINIC | Age: 64
End: 2019-03-28
Attending: INTERNAL MEDICINE
Payer: COMMERCIAL

## 2019-03-28 DIAGNOSIS — Z98.61 POSTSURGICAL PERCUTANEOUS TRANSLUMINAL CORONARY ANGIOPLASTY STATUS: ICD-10-CM

## 2019-03-28 DIAGNOSIS — I25.118 CORONARY ARTERY DISEASE OF NATIVE ARTERY OF NATIVE HEART WITH STABLE ANGINA PECTORIS (H): ICD-10-CM

## 2019-03-28 PROCEDURE — 40000575 ZZH STATISTIC OP CARDIAC VISIT #2: Performed by: REHABILITATION PRACTITIONER

## 2019-03-28 PROCEDURE — 93798 PHYS/QHP OP CAR RHAB W/ECG: CPT

## 2019-03-28 PROCEDURE — 93797 PHYS/QHP OP CAR RHAB WO ECG: CPT | Performed by: REHABILITATION PRACTITIONER

## 2019-03-28 PROCEDURE — 40000116 ZZH STATISTIC OP CR VISIT

## 2019-04-04 ENCOUNTER — HOSPITAL ENCOUNTER (OUTPATIENT)
Dept: CARDIAC REHAB | Facility: CLINIC | Age: 64
End: 2019-04-04
Attending: INTERNAL MEDICINE
Payer: COMMERCIAL

## 2019-04-04 PROCEDURE — 40000116 ZZH STATISTIC OP CR VISIT: Performed by: OCCUPATIONAL THERAPIST

## 2019-04-04 PROCEDURE — 93798 PHYS/QHP OP CAR RHAB W/ECG: CPT | Performed by: OCCUPATIONAL THERAPIST

## 2019-04-09 ENCOUNTER — HOSPITAL ENCOUNTER (OUTPATIENT)
Dept: CARDIAC REHAB | Facility: CLINIC | Age: 64
End: 2019-04-09
Attending: INTERNAL MEDICINE
Payer: COMMERCIAL

## 2019-04-09 PROCEDURE — 40000116 ZZH STATISTIC OP CR VISIT

## 2019-04-09 PROCEDURE — 93798 PHYS/QHP OP CAR RHAB W/ECG: CPT

## 2019-04-16 ENCOUNTER — HOSPITAL ENCOUNTER (OUTPATIENT)
Dept: CARDIAC REHAB | Facility: CLINIC | Age: 64
End: 2019-04-16
Attending: INTERNAL MEDICINE
Payer: COMMERCIAL

## 2019-04-16 PROCEDURE — 40000116 ZZH STATISTIC OP CR VISIT

## 2019-04-16 PROCEDURE — 93798 PHYS/QHP OP CAR RHAB W/ECG: CPT

## 2019-04-18 ENCOUNTER — HOSPITAL ENCOUNTER (OUTPATIENT)
Dept: CARDIAC REHAB | Facility: CLINIC | Age: 64
End: 2019-04-18
Attending: INTERNAL MEDICINE
Payer: COMMERCIAL

## 2019-04-18 PROCEDURE — 93798 PHYS/QHP OP CAR RHAB W/ECG: CPT | Performed by: REHABILITATION PRACTITIONER

## 2019-04-18 PROCEDURE — 40000116 ZZH STATISTIC OP CR VISIT: Performed by: REHABILITATION PRACTITIONER

## 2019-04-23 ENCOUNTER — ANCILLARY PROCEDURE (OUTPATIENT)
Dept: GENERAL RADIOLOGY | Facility: CLINIC | Age: 64
End: 2019-04-23
Attending: FAMILY MEDICINE
Payer: COMMERCIAL

## 2019-04-23 ENCOUNTER — OFFICE VISIT (OUTPATIENT)
Dept: INTERNAL MEDICINE | Facility: CLINIC | Age: 64
End: 2019-04-23
Payer: COMMERCIAL

## 2019-04-23 ENCOUNTER — HOSPITAL ENCOUNTER (OUTPATIENT)
Dept: CARDIAC REHAB | Facility: CLINIC | Age: 64
End: 2019-04-23
Attending: INTERNAL MEDICINE
Payer: COMMERCIAL

## 2019-04-23 VITALS
RESPIRATION RATE: 16 BRPM | OXYGEN SATURATION: 100 % | HEIGHT: 68 IN | HEART RATE: 93 BPM | SYSTOLIC BLOOD PRESSURE: 121 MMHG | DIASTOLIC BLOOD PRESSURE: 80 MMHG | WEIGHT: 242.2 LBS | BODY MASS INDEX: 36.71 KG/M2 | TEMPERATURE: 98.2 F

## 2019-04-23 DIAGNOSIS — M79.642 PAIN IN BOTH HANDS: ICD-10-CM

## 2019-04-23 DIAGNOSIS — M25.531 PAIN IN BOTH WRISTS: Primary | ICD-10-CM

## 2019-04-23 DIAGNOSIS — M79.641 PAIN IN BOTH HANDS: ICD-10-CM

## 2019-04-23 DIAGNOSIS — M25.531 PAIN IN BOTH WRISTS: ICD-10-CM

## 2019-04-23 DIAGNOSIS — M25.532 PAIN IN BOTH WRISTS: Primary | ICD-10-CM

## 2019-04-23 DIAGNOSIS — M25.532 PAIN IN BOTH WRISTS: ICD-10-CM

## 2019-04-23 LAB
CRP SERPL-MCNC: 23 MG/L (ref 0–8)
ERYTHROCYTE [SEDIMENTATION RATE] IN BLOOD BY WESTERGREN METHOD: 22 MM/H (ref 0–30)

## 2019-04-23 PROCEDURE — 36415 COLL VENOUS BLD VENIPUNCTURE: CPT | Performed by: FAMILY MEDICINE

## 2019-04-23 PROCEDURE — 40000116 ZZH STATISTIC OP CR VISIT

## 2019-04-23 PROCEDURE — 86255 FLUORESCENT ANTIBODY SCREEN: CPT | Performed by: FAMILY MEDICINE

## 2019-04-23 PROCEDURE — 73130 X-RAY EXAM OF HAND: CPT | Mod: LT

## 2019-04-23 PROCEDURE — 93798 PHYS/QHP OP CAR RHAB W/ECG: CPT

## 2019-04-23 PROCEDURE — 86140 C-REACTIVE PROTEIN: CPT | Performed by: FAMILY MEDICINE

## 2019-04-23 PROCEDURE — 86431 RHEUMATOID FACTOR QUANT: CPT | Performed by: FAMILY MEDICINE

## 2019-04-23 PROCEDURE — 85652 RBC SED RATE AUTOMATED: CPT | Performed by: FAMILY MEDICINE

## 2019-04-23 PROCEDURE — 99213 OFFICE O/P EST LOW 20 MIN: CPT | Performed by: FAMILY MEDICINE

## 2019-04-23 RX ORDER — PREDNISONE 10 MG/1
TABLET ORAL
Qty: 30 TABLET | Refills: 0 | Status: SHIPPED | OUTPATIENT
Start: 2019-04-23 | End: 2019-06-04

## 2019-04-23 ASSESSMENT — MIFFLIN-ST. JEOR: SCORE: 1707.11

## 2019-04-23 NOTE — PROGRESS NOTES
"  CHIEF COMPLAINT    Hand and wrist pain.      HISTORY    She has had pain for 4-5 days. Wrist pain, stiffness - especially ulnar aspect . MCPJ's of both hands are involved.  Elbows and shoulder is not bothering.  Some foot discomfort.    No recent viral illnesses.      Patient Active Problem List   Diagnosis     Essential hypertension, benign     HCD (health care directive)     CARDIOVASCULAR SCREENING; LDL GOAL LESS THAN 130     Class 2 obesity due to excess calories without serious comorbidity with body mass index (BMI) of 36.0 to 36.9 in adult     BORJA (dyspnea on exertion)     Nonrheumatic aortic valve stenosis     Abnormal stress test     Status post coronary angiogram       Current Outpatient Medications   Medication Sig Dispense Refill     aspirin 81 MG EC tablet Take 81 mg by mouth daily       atorvastatin (LIPITOR) 40 MG tablet Take 1 tablet (40 mg) by mouth daily 90 tablet 3     clopidogrel (PLAVIX) 75 MG tablet Take 1 tablet (75 mg) by mouth daily 90 tablet 3     lisinopril-hydrochlorothiazide (PRINZIDE/ZESTORETIC) 20-12.5 MG tablet Take 1 tablet by mouth daily 90 tablet 3     metoprolol succinate ER (TOPROL XL) 25 MG 24 hr tablet Take 0.5 tablets (12.5 mg) by mouth daily 45 tablet 3     MULTIPLE MINERALS-VITAMINS OR TABS        nitroGLYcerin (NITROSTAT) 0.4 MG sublingual tablet One tablet under the tongue every 5 minutes if needed for chest pain. 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      REVIEW OF SYSTEMS    No fevers  No congestion or cough.  No S OB.  No chest pains.  No vomiting, diarrhea, abdominal pain.  No urinary difficulty.  No rashes.      Past Medical History:   Diagnosis Date     Aortic valve stenosis      Essential hypertension, benign      Fracture, tibia, shaft      History of colposcopy with cervical biopsy 2/22/11    AMRITA I     " "Papanicolaou smear of vagina with atypical squamous cells cannot exclude high grade squamous intraepithelial lesion (ASC-H) 1/31/11         EXAM  /80 (BP Location: Left arm, Patient Position: Sitting, Cuff Size: Adult Large)   Pulse 93   Temp 98.2  F (36.8  C) (Oral)   Resp 16   Ht 1.727 m (5' 8\")   Wt 109.9 kg (242 lb 3.2 oz)   LMP 07/07/2008   SpO2 100%   BMI 36.83 kg/m      Patient appears well in general.  HEENT unremarkable.  Neck no thyromegaly.  CV-RSR, 2-3/6 ZAYDA  Upper extremities:  Patient has diminished range of motion and tenderness in both wrists, flexion and extension are about 40 degrees each way.  There is mild swelling over the MCP joints      (M25.531,  M25.532) Pain in both wrists  (primary encounter diagnosis)  Comment:     Patient appears to have an acute inflammatory process in the wrist and hands.  Lab evaluation and x-rays ordered.  Brief course of prednisone was begun.  Results to be reviewed and consultation could be considered also.    Plan: ESR: Erythrocyte sedimentation rate, CRP,         inflammation, Rheumatoid factor, ANCA IgG by         IFA with Reflex to Titer, predniSONE         (DELTASONE) 10 MG tablet, XR Hand Bilateral G/E        3 Views            (M79.641,  M79.642) Pain in both hands  Comment:   Plan: ESR: Erythrocyte sedimentation rate, CRP,         inflammation, Rheumatoid factor, ANCA IgG by         IFA with Reflex to Titer, predniSONE         (DELTASONE) 10 MG tablet, XR Hand Bilateral G/E        3 Views              "

## 2019-04-24 LAB
ANCA AB PATTERN SER IF-IMP: NORMAL
C-ANCA TITR SER IF: NORMAL {TITER}
RHEUMATOID FACT SER NEPH-ACNC: <20 IU/ML (ref 0–20)

## 2019-04-25 ENCOUNTER — HOSPITAL ENCOUNTER (OUTPATIENT)
Dept: CARDIAC REHAB | Facility: CLINIC | Age: 64
End: 2019-04-25
Attending: INTERNAL MEDICINE
Payer: COMMERCIAL

## 2019-04-25 PROCEDURE — 93798 PHYS/QHP OP CAR RHAB W/ECG: CPT

## 2019-04-25 PROCEDURE — 40000116 ZZH STATISTIC OP CR VISIT

## 2019-04-30 ENCOUNTER — HOSPITAL ENCOUNTER (OUTPATIENT)
Dept: CARDIAC REHAB | Facility: CLINIC | Age: 64
End: 2019-04-30
Attending: INTERNAL MEDICINE
Payer: COMMERCIAL

## 2019-04-30 PROCEDURE — 93798 PHYS/QHP OP CAR RHAB W/ECG: CPT

## 2019-04-30 PROCEDURE — 40000116 ZZH STATISTIC OP CR VISIT

## 2019-05-01 DIAGNOSIS — Z98.61 POSTSURGICAL PERCUTANEOUS TRANSLUMINAL CORONARY ANGIOPLASTY STATUS: ICD-10-CM

## 2019-05-01 DIAGNOSIS — I25.118 CORONARY ARTERY DISEASE OF NATIVE ARTERY OF NATIVE HEART WITH STABLE ANGINA PECTORIS (H): ICD-10-CM

## 2019-05-01 RX ORDER — CLOPIDOGREL BISULFATE 75 MG/1
75 TABLET ORAL DAILY
Qty: 90 TABLET | Refills: 3 | Status: SHIPPED | OUTPATIENT
Start: 2019-05-01 | End: 2020-08-12

## 2019-05-02 ENCOUNTER — HOSPITAL ENCOUNTER (OUTPATIENT)
Dept: CARDIAC REHAB | Facility: CLINIC | Age: 64
End: 2019-05-02
Attending: INTERNAL MEDICINE
Payer: COMMERCIAL

## 2019-05-02 PROCEDURE — 40000116 ZZH STATISTIC OP CR VISIT: Performed by: OCCUPATIONAL THERAPIST

## 2019-05-02 PROCEDURE — 93798 PHYS/QHP OP CAR RHAB W/ECG: CPT | Performed by: OCCUPATIONAL THERAPIST

## 2019-05-07 ENCOUNTER — HOSPITAL ENCOUNTER (OUTPATIENT)
Dept: CARDIAC REHAB | Facility: CLINIC | Age: 64
End: 2019-05-07
Attending: INTERNAL MEDICINE
Payer: COMMERCIAL

## 2019-05-07 PROCEDURE — 93798 PHYS/QHP OP CAR RHAB W/ECG: CPT

## 2019-05-07 PROCEDURE — 40000116 ZZH STATISTIC OP CR VISIT

## 2019-05-09 ENCOUNTER — HOSPITAL ENCOUNTER (OUTPATIENT)
Dept: CARDIAC REHAB | Facility: CLINIC | Age: 64
End: 2019-05-09
Attending: INTERNAL MEDICINE
Payer: COMMERCIAL

## 2019-05-09 PROCEDURE — 93798 PHYS/QHP OP CAR RHAB W/ECG: CPT | Performed by: OCCUPATIONAL THERAPIST

## 2019-05-09 PROCEDURE — 40000116 ZZH STATISTIC OP CR VISIT: Performed by: OCCUPATIONAL THERAPIST

## 2019-05-14 ENCOUNTER — HOSPITAL ENCOUNTER (OUTPATIENT)
Dept: CARDIAC REHAB | Facility: CLINIC | Age: 64
End: 2019-05-14
Attending: INTERNAL MEDICINE
Payer: COMMERCIAL

## 2019-05-14 PROCEDURE — 40000116 ZZH STATISTIC OP CR VISIT

## 2019-05-14 PROCEDURE — 93798 PHYS/QHP OP CAR RHAB W/ECG: CPT

## 2019-05-15 ENCOUNTER — HOSPITAL ENCOUNTER (OUTPATIENT)
Dept: CARDIAC REHAB | Facility: CLINIC | Age: 64
End: 2019-05-15
Attending: INTERNAL MEDICINE
Payer: COMMERCIAL

## 2019-05-15 NOTE — CONSULTS
"NUTRITION ASSESSMENT & EDUCATION NOTE    REASON FOR ASSESSMENT  Heather Bauer is a 64 year old female seen by Registered Dietitian for 1:1 Cardiac and Pulmonary Rehab consult     Nutrition History    Information obtained from patient    Patient has been instructed to follow the heart healthy, 2g sodium, low saturated fat, consistent carb diet.    Previous diet education: none. Pt was able to name sodium as one nutrient recommended to limit    Patient has attended no nutrition classes offered by cardiac rehab, due to scheduling problems     Nutrition-related goals: learn about heart healthy diet, cut back on Na, Sugar    Grocery shopping, cooking, eating out - Heather does most of the cooking at home, she grocery shops at [employer], etc.     Economic factors: none identified.     Typical intake as follows:   - Breakfast: up at 4:30am. Bagel with OO butter spread  Or PB, or bowl of cereal with honey.   - Lunch: Activia yogurt, apple or granola bar   - Dinner: Chili, spaghetti, tortilla soup, tator tot hot dish, tacos, salmon w asparagus, simply mashed potatoes, frozen tilapia with veg and rice   - Snacks: apple or granola bar; chips; dark chocolate      - Fluids: black coffee       Changes to diet since cardiac event: has started paying attention to sodium, looking at labels \"I could do better\"    Barriers to dietary changes: Pt notes issues with feeling hungry between meals or after dinner.     NUTRITION DIAGNOSIS  Food- and nutrition- related knowledge deficit related to heart healthy diet as evidenced by pt verbalizes appropriate questions.     INTERVENTIONS    Nutrition Prescription:  Cardiac diet:   Low saturated fat, Na <2400 mg  Fiber >25 g per day     Implementation    Assessed learning needs and learning preference.    Nutrition Education (Content):  a) Provided handouts:  a. Nutrition 1 slide show  b. Website info for Choose My Plate, Fruits and Veggies More Matters, American Heart " Association  b) Discussed TLC diet recommendations, including label reading, minimizing added salts/saturated fats/sugars, balanced meals TID, at least two food groups per snack, heart healthy substitutes, no trans fat    Nutrition Education (Application):  a) Discussed eating habits and recommended alternative food choices:  a. Emphasized fruits, vegetables, low sodium nuts/heart healthy fats, fish, low fat dairy  b. Reviewed recipes and new food ideas such as beans, lentils, vegetables and tofu.    Goals/Follow up/Monitoring For Cardiac Rehab Staff    Adherence to nutrition related recommendations, follow with cardiac rehab    Additional questions/concerns related to TLC/heart healthy guidelines      Duyen Quezada RD, LD   [facility]  Office: [phone]    DIRECT PATIENT CARE TIME: 45 MINUTES

## 2019-05-16 ENCOUNTER — HOSPITAL ENCOUNTER (OUTPATIENT)
Dept: CARDIAC REHAB | Facility: CLINIC | Age: 64
End: 2019-05-16
Attending: INTERNAL MEDICINE
Payer: COMMERCIAL

## 2019-05-16 PROCEDURE — 93798 PHYS/QHP OP CAR RHAB W/ECG: CPT | Performed by: OCCUPATIONAL THERAPIST

## 2019-05-16 PROCEDURE — 40000116 ZZH STATISTIC OP CR VISIT: Performed by: OCCUPATIONAL THERAPIST

## 2019-05-21 ENCOUNTER — HOSPITAL ENCOUNTER (OUTPATIENT)
Dept: CARDIAC REHAB | Facility: CLINIC | Age: 64
End: 2019-05-21
Attending: INTERNAL MEDICINE
Payer: COMMERCIAL

## 2019-05-21 PROCEDURE — 93798 PHYS/QHP OP CAR RHAB W/ECG: CPT

## 2019-05-21 PROCEDURE — 40000116 ZZH STATISTIC OP CR VISIT

## 2019-05-30 ENCOUNTER — HOSPITAL ENCOUNTER (OUTPATIENT)
Dept: CARDIAC REHAB | Facility: CLINIC | Age: 64
End: 2019-05-30
Attending: INTERNAL MEDICINE
Payer: COMMERCIAL

## 2019-05-30 PROCEDURE — 40000116 ZZH STATISTIC OP CR VISIT: Performed by: OCCUPATIONAL THERAPIST

## 2019-05-30 PROCEDURE — 93798 PHYS/QHP OP CAR RHAB W/ECG: CPT | Performed by: OCCUPATIONAL THERAPIST

## 2019-06-04 ENCOUNTER — OFFICE VISIT (OUTPATIENT)
Dept: CARDIOLOGY | Facility: CLINIC | Age: 64
End: 2019-06-04
Attending: PHYSICIAN ASSISTANT
Payer: COMMERCIAL

## 2019-06-04 ENCOUNTER — HOSPITAL ENCOUNTER (OUTPATIENT)
Dept: CARDIAC REHAB | Facility: CLINIC | Age: 64
End: 2019-06-04
Attending: INTERNAL MEDICINE
Payer: COMMERCIAL

## 2019-06-04 VITALS
HEIGHT: 68 IN | SYSTOLIC BLOOD PRESSURE: 102 MMHG | BODY MASS INDEX: 36.62 KG/M2 | HEART RATE: 72 BPM | DIASTOLIC BLOOD PRESSURE: 70 MMHG | WEIGHT: 241.6 LBS

## 2019-06-04 DIAGNOSIS — I35.0 AORTIC VALVE STENOSIS, ETIOLOGY OF CARDIAC VALVE DISEASE UNSPECIFIED: ICD-10-CM

## 2019-06-04 DIAGNOSIS — I25.10 CORONARY ARTERY DISEASE INVOLVING NATIVE CORONARY ARTERY OF NATIVE HEART WITHOUT ANGINA PECTORIS: Primary | ICD-10-CM

## 2019-06-04 PROCEDURE — 93798 PHYS/QHP OP CAR RHAB W/ECG: CPT | Performed by: OCCUPATIONAL THERAPIST

## 2019-06-04 PROCEDURE — 99214 OFFICE O/P EST MOD 30 MIN: CPT | Performed by: PHYSICIAN ASSISTANT

## 2019-06-04 PROCEDURE — 40000116 ZZH STATISTIC OP CR VISIT: Performed by: OCCUPATIONAL THERAPIST

## 2019-06-04 RX ORDER — ATORVASTATIN CALCIUM 40 MG/1
40 TABLET, FILM COATED ORAL DAILY
Qty: 90 TABLET | Refills: 3 | Status: SHIPPED | OUTPATIENT
Start: 2019-06-04 | End: 2020-01-16

## 2019-06-04 RX ORDER — METOPROLOL SUCCINATE 25 MG/1
12.5 TABLET, EXTENDED RELEASE ORAL DAILY
Qty: 45 TABLET | Refills: 3 | Status: SHIPPED | OUTPATIENT
Start: 2019-06-04 | End: 2020-01-16

## 2019-06-04 ASSESSMENT — MIFFLIN-ST. JEOR: SCORE: 1704.39

## 2019-06-04 NOTE — PROGRESS NOTES
"Please see separate dictation for HPI, PHYSICAL EXAM AND IMPRESSION/PLAN.    CURRENT MEDICATIONS:  Current Outpatient Medications   Medication Sig Dispense Refill     aspirin 81 MG EC tablet Take 81 mg by mouth daily       atorvastatin (LIPITOR) 40 MG tablet Take 1 tablet (40 mg) by mouth daily 90 tablet 3     clopidogrel (PLAVIX) 75 MG tablet Take 1 tablet (75 mg) by mouth daily 90 tablet 3     lisinopril-hydrochlorothiazide (PRINZIDE/ZESTORETIC) 20-12.5 MG tablet Take 1 tablet by mouth daily 90 tablet 3     metoprolol succinate ER (TOPROL XL) 25 MG 24 hr tablet Take 0.5 tablets (12.5 mg) by mouth daily 45 tablet 3     MULTIPLE MINERALS-VITAMINS OR TABS        nitroGLYcerin (NITROSTAT) 0.4 MG sublingual tablet One tablet under the tongue every 5 minutes if needed for chest pain. May repeat every 5 minutes for a maximum of 3 doses in 15 minutes\" 25 tablet 3     predniSONE (DELTASONE) 10 MG tablet 3 daily for 5 days, then 2 daily for 5 days, then 1 daily for 5 days.. (Patient not taking: Reported on 6/4/2019) 30 tablet 0       ALLERGIES:     Allergies   Allergen Reactions     No Known Drug Allergies        PAST MEDICAL HISTORY:  Past Medical History:   Diagnosis Date     Aortic valve stenosis      Essential hypertension, benign      Fracture, tibia, shaft      History of colposcopy with cervical biopsy 2/22/11    AMRITA I     Papanicolaou smear of vagina with atypical squamous cells cannot exclude high grade squamous intraepithelial lesion (ASC-H) 1/31/11       PAST SURGICAL HISTORY:  Past Surgical History:   Procedure Laterality Date     C APPENDECTOMY       CV CORONARY ANGIOGRAM N/A 3/11/2019    Procedure: Coronary Angiogram;  Surgeon: Adolph Dave MD;  Location: [facility] CARDIAC CATH LAB     CV PCI ANGIOPLASTY N/A 3/11/2019    Procedure: Percutaneous Coronary Intervention;  Surgeon: Adolph Dave MD;  Location: [facility] CARDIAC CATH LAB       SOCIAL HISTORY:  Social History     Socioeconomic History "     Marital status:      Spouse name: Not on file     Number of children: 2     Years of education: Not on file     Highest education level: Not on file   Occupational History     Employer: [employer]   Social Needs     Financial resource strain: Not on file     Food insecurity:     Worry: Not on file     Inability: Not on file     Transportation needs:     Medical: Not on file     Non-medical: Not on file   Tobacco Use     Smoking status: Former Smoker     Smokeless tobacco: Never Used     Tobacco comment: quit 20 years ago   Substance and Sexual Activity     Alcohol use: Yes     Comment: wine every week     Drug use: No     Sexual activity: Yes     Birth control/protection: Condom   Lifestyle     Physical activity:     Days per week: Not on file     Minutes per session: Not on file     Stress: Not on file   Relationships     Social connections:     Talks on phone: Not on file     Gets together: Not on file     Attends [employer] service: Not on file     Active member of club or organization: Not on file     Attends meetings of clubs or organizations: Not on file     Relationship status: Not on file     Intimate partner violence:     Fear of current or ex partner: Not on file     Emotionally abused: Not on file     Physically abused: Not on file     Forced sexual activity: Not on file   Other Topics Concern      Service Not Asked     Blood Transfusions Not Asked     Caffeine Concern Not Asked     Occupational Exposure Not Asked     Hobby Hazards Not Asked     Sleep Concern Not Asked     Stress Concern Not Asked     Weight Concern Not Asked     Special Diet Not Asked     Back Care Not Asked     Exercise Yes     Bike Helmet Not Asked     Seat Belt Yes     Self-Exams Not Asked     Parent/sibling w/ CABG, MI or angioplasty before 65F 55M? Not Asked   Social History Narrative     Not on file       FAMILY HISTORY:  Family History   Problem Relation Age of Onset     C.A.D. Mother         early 60's      Lipids Mother      Heart Disease Mother 68        heart bypass surgery     Hypertension Mother      Thyroid Disease Mother      Heart Disease Father         valvular disease     Thyroid Disease Paternal Aunt      Heart Disease Maternal Grandmother         heart attack       Review of Systems:  Skin:          Eyes:         ENT:         Respiratory:          Cardiovascular:         Gastroenterology:        Genitourinary:         Musculoskeletal:         Neurologic:         Psychiatric:         Heme/Lymph/Imm:         Endocrine:            Reviewed. Remainder of the note dictated.    Blanca Ash PA-C

## 2019-06-04 NOTE — LETTER
6/4/2019      Sylvia Bird MD  [address] [facility] [address]  [facility] [address]      RE: Heather Callejasjulia       Dear Colleague,    I had the pleasure of seeing Heather Bauer in the [facility].    Service Date: 06/04/2019      HISTORY OF PRESENT ILLNESS:  Ms. Bauer is a pleasant 64-year-old female who presents to the office today for a 3-month followup.      Again, she was referred to the Cardiology office earlier this year after she had an echocardiogram showing moderate to severe aortic stenosis and she had been experiencing dyspnea on exertion.  Due to her history of hypertension and prior tobacco use along with a family history of coronary artery disease, it was recommended she undergo a stress echocardiogram.  This was significantly abnormal and suggested LAD ischemia.  She was referred on for cardiac catheterization and was found to have a severe lesion in the proximal LAD which was treated with drug-eluting stent placement x1.  She was started on dual antiplatelet therapy.        She was seen by Dr. Palmer of CV Surgery due to the aortic stenosis.  He felt her echocardiogram findings were most consistent with moderate aortic stenosis and recommended that she have reevaluation in approximately 6 months.  I saw the patient for followup in March after her coronary angiogram.  At that time, she had noticed an improvement in the dyspnea on exertion, but had not completely resolved.  I asked her to follow up in the office in a couple of months to see how she was progressing.      At this point, the patient states that she is feeling really well.  She denies any dyspnea on exertion.  She is not having any shortness of breath at rest.  She denies any chest discomfort.  No orthopnea, PND or lower extremity edema.  N syncope, presyncope or palpitations.  She is tolerating her medical therapy without difficulty.  It does not look like her cholesterol has been rechecked since she was  started on atorvastatin.      CURRENT CARDIAC MEDICATIONS:   1.  Aspirin 81 mg daily.   2.  Atorvastatin 40 mg daily.   3.  Clopidogrel 75 mg daily.   4.  Lisinopril/hydrochlorothiazide 20/12.5 mg once daily.   5.  Toprol-XL 25 mg half tablet daily.   6.  Sublingual nitro p.r.n.      The remainder of her medications, allergies and review of systems were reviewed and as are documented separately.      PHYSICAL EXAMINATION:   GENERAL:  The patient is a pleasant 64-year-old female who appears her stated age.   VITAL SIGNS:  Her blood pressure is 102/70, pulse is 72, weight is stable at 241 pounds.  Breathing is nonlabored.   LUNGS:  Clear to auscultation.   CARDIAC:  Reveals a regular rate and rhythm.  She does have a 2/6 systolic ejection murmur heard best at the upper left hand border.   EXTREMITIES:  Lower extremities show no evidence of edema.   NEUROLOGIC:  Alert and oriented.      ASSESSMENT AND  PLAN:  Ms. Bauer is a pleasant 64-year-old female with a history of what appears to be moderate aortic stenosis, hypertension and coronary artery disease with stent placement to the proximal LAD earlier this year with minimal disease noted in the remainder of the coronary tree, who presents today for followup.  Her previously noted dyspnea on exertion has completely resolved.  She continues to participate in Cardiac Rehab.  She is tolerating her medication regimen without difficulty.  Again, it does not appear that her cholesterol has been rechecked since being started on atorvastatin.  We will plan to recheck this along with an ALT in the next few weeks at the patient's convenience.      We will plan to repeat an echocardiogram to reassess her aortic stenosis and have her follow up with Dr. Dave this fall as previously recommended.  Of course, I encouraged her to contact us sooner with any questions or concerns.         SYDNI PICKETT PA-C             D: 06/04/2019   T: 06/04/2019   MT: SIVAKUMAR      Name:      "ABBY ASHTON   MRN:      [id number]        Account:      [id number]   :      1955           Service Date: 2019      Document: [id number]         Outpatient Encounter Medications as of 2019   Medication Sig Dispense Refill     aspirin 81 MG EC tablet Take 81 mg by mouth daily       atorvastatin (LIPITOR) 40 MG tablet Take 1 tablet (40 mg) by mouth daily 90 tablet 3     clopidogrel (PLAVIX) 75 MG tablet Take 1 tablet (75 mg) by mouth daily 90 tablet 3     lisinopril-hydrochlorothiazide (PRINZIDE/ZESTORETIC) 20-12.5 MG tablet Take 1 tablet by mouth daily 90 tablet 3     metoprolol succinate ER (TOPROL XL) 25 MG 24 hr tablet Take 0.5 tablets (12.5 mg) by mouth daily 45 tablet 3     MULTIPLE MINERALS-VITAMINS OR TABS        nitroGLYcerin (NITROSTAT) 0.4 MG sublingual tablet One tablet under the tongue every 5 minutes if needed for chest pain. May repeat every 5 minutes for a maximum of 3 doses in 15 minutes\" 25 tablet 3     [DISCONTINUED] atorvastatin (LIPITOR) 40 MG tablet Take 1 tablet (40 mg) by mouth daily 90 tablet 3     [DISCONTINUED] metoprolol succinate ER (TOPROL XL) 25 MG 24 hr tablet Take 0.5 tablets (12.5 mg) by mouth daily 45 tablet 3     [DISCONTINUED] predniSONE (DELTASONE) 10 MG tablet 3 daily for 5 days, then 2 daily for 5 days, then 1 daily for 5 days.. (Patient not taking: Reported on 2019) 30 tablet 0     No facility-administered encounter medications on file as of 2019.        Again, thank you for allowing me to participate in the care of your patient.      Sincerely,    Blanca Ash PA-C     [facility]    "

## 2019-06-04 NOTE — PATIENT INSTRUCTIONS
Thank you for your [facility] visit today. Your provider has recommended the following:  Medication Changes:  No changes  Refills sent   Recommendations:  Fasting labs (10-12 hours) at your convenience in the next few weeks  Echo in the fall of 2019  Follow-up:  See Dr Dave for cardiology follow up in the fall of 2019.    Reminder:  Please bring in your current medication list or your medication, over the counter supplements and vitamin bottles as we will review these at each office visit.

## 2019-06-04 NOTE — LETTER
"6/4/2019    Sylvia Bird MD  [address]  [facility] [address]    RE: Heather Bauer       Dear Colleague,    I had the pleasure of seeing Heather Bauer in the [facility].    Please see separate dictation for HPI, PHYSICAL EXAM AND IMPRESSION/PLAN.    CURRENT MEDICATIONS:  Current Outpatient Medications   Medication Sig Dispense Refill     aspirin 81 MG EC tablet Take 81 mg by mouth daily       atorvastatin (LIPITOR) 40 MG tablet Take 1 tablet (40 mg) by mouth daily 90 tablet 3     clopidogrel (PLAVIX) 75 MG tablet Take 1 tablet (75 mg) by mouth daily 90 tablet 3     lisinopril-hydrochlorothiazide (PRINZIDE/ZESTORETIC) 20-12.5 MG tablet Take 1 tablet by mouth daily 90 tablet 3     metoprolol succinate ER (TOPROL XL) 25 MG 24 hr tablet Take 0.5 tablets (12.5 mg) by mouth daily 45 tablet 3     MULTIPLE MINERALS-VITAMINS OR TABS        nitroGLYcerin (NITROSTAT) 0.4 MG sublingual tablet One tablet under the tongue every 5 minutes if needed for chest pain. May repeat every 5 minutes for a maximum of 3 doses in 15 minutes\" 25 tablet 3     predniSONE (DELTASONE) 10 MG tablet 3 daily for 5 days, then 2 daily for 5 days, then 1 daily for 5 days.. (Patient not taking: Reported on 6/4/2019) 30 tablet 0       ALLERGIES:     Allergies   Allergen Reactions     No Known Drug Allergies        PAST MEDICAL HISTORY:  Past Medical History:   Diagnosis Date     Aortic valve stenosis      Essential hypertension, benign      Fracture, tibia, shaft      History of colposcopy with cervical biopsy 2/22/11    AMRITA I     Papanicolaou smear of vagina with atypical squamous cells cannot exclude high grade squamous intraepithelial lesion (ASC-H) 1/31/11       PAST SURGICAL HISTORY:  Past Surgical History:   Procedure Laterality Date     C APPENDECTOMY       CV CORONARY ANGIOGRAM N/A 3/11/2019    Procedure: Coronary Angiogram;  Surgeon: Adolph Dave MD;  Location: [facility] CARDIAC CATH LAB "     CV PCI ANGIOPLASTY N/A 3/11/2019    Procedure: Percutaneous Coronary Intervention;  Surgeon: Adolph Dave MD;  Location:  HEART CARDIAC CATH LAB       SOCIAL HISTORY:  Social History     Socioeconomic History     Marital status:      Spouse name: Not on file     Number of children: 2     Years of education: Not on file     Highest education level: Not on file   Occupational History     Employer: [employer]   Social Needs     Financial resource strain: Not on file     Food insecurity:     Worry: Not on file     Inability: Not on file     Transportation needs:     Medical: Not on file     Non-medical: Not on file   Tobacco Use     Smoking status: Former Smoker     Smokeless tobacco: Never Used     Tobacco comment: quit 20 years ago   Substance and Sexual Activity     Alcohol use: Yes     Comment: wine every week     Drug use: No     Sexual activity: Yes     Birth control/protection: Condom   Lifestyle     Physical activity:     Days per week: Not on file     Minutes per session: Not on file     Stress: Not on file   Relationships     Social connections:     Talks on phone: Not on file     Gets together: Not on file     Attends [employer] service: Not on file     Active member of club or organization: Not on file     Attends meetings of clubs or organizations: Not on file     Relationship status: Not on file     Intimate partner violence:     Fear of current or ex partner: Not on file     Emotionally abused: Not on file     Physically abused: Not on file     Forced sexual activity: Not on file   Other Topics Concern      Service Not Asked     Blood Transfusions Not Asked     Caffeine Concern Not Asked     Occupational Exposure Not Asked     Hobby Hazards Not Asked     Sleep Concern Not Asked     Stress Concern Not Asked     Weight Concern Not Asked     Special Diet Not Asked     Back Care Not Asked     Exercise Yes     Bike Helmet Not Asked     Seat Belt Yes     Self-Exams Not Asked      Parent/sibling w/ CABG, MI or angioplasty before 65F 55M? Not Asked   Social History Narrative     Not on file       FAMILY HISTORY:  Family History   Problem Relation Age of Onset     C.A.D. Mother         early 60's     Lipids Mother      Heart Disease Mother 68        heart bypass surgery     Hypertension Mother      Thyroid Disease Mother      Heart Disease Father         valvular disease     Thyroid Disease Paternal Aunt      Heart Disease Maternal Grandmother         heart attack       Review of Systems:  Skin:          Eyes:         ENT:         Respiratory:          Cardiovascular:         Gastroenterology:        Genitourinary:         Musculoskeletal:         Neurologic:         Psychiatric:         Heme/Lymph/Imm:         Endocrine:            Reviewed. Remainder of the note dictated.    Blanca Ash PA-C    Service Date: 06/04/2019      HISTORY OF PRESENT ILLNESS:  Ms. Bauer is a pleasant 64-year-old female who presents to the office today for a 3-month followup.      Again, she was referred to the Cardiology office earlier this year after she had an echocardiogram showing moderate to severe aortic stenosis and she had been experiencing dyspnea on exertion.  Due to her history of hypertension and prior tobacco use along with a family history of coronary artery disease, it was recommended she undergo a stress echocardiogram.  This was significantly abnormal and suggested LAD ischemia.  She was referred on for cardiac catheterization and was found to have a severe lesion in the proximal LAD which was treated with drug-eluting stent placement x1.  She was started on dual antiplatelet therapy.        She was seen by Dr. Palmer of CV Surgery due to the aortic stenosis.  He felt her echocardiogram findings were most consistent with moderate aortic stenosis and recommended that she have reevaluation in approximately 6 months.  I saw the patient for followup in March after her coronary angiogram.   At that time, she had noticed an improvement in the dyspnea on exertion, but had not completely resolved.  I asked her to follow up in the office in a couple of months to see how she was progressing.      At this point, the patient states that she is feeling really well.  She denies any dyspnea on exertion.  She is not having any shortness of breath at rest.  She denies any chest discomfort.  No orthopnea, PND or lower extremity edema.  N syncope, presyncope or palpitations.  She is tolerating her medical therapy without difficulty.  It does not look like her cholesterol has been rechecked since she was started on atorvastatin.      CURRENT CARDIAC MEDICATIONS:   1.  Aspirin 81 mg daily.   2.  Atorvastatin 40 mg daily.   3.  Clopidogrel 75 mg daily.   4.  Lisinopril/hydrochlorothiazide 20/12.5 mg once daily.   5.  Toprol-XL 25 mg half tablet daily.   6.  Sublingual nitro p.r.n.      The remainder of her medications, allergies and review of systems were reviewed and as are documented separately.      PHYSICAL EXAMINATION:   GENERAL:  The patient is a pleasant 64-year-old female who appears her stated age.   VITAL SIGNS:  Her blood pressure is 102/70, pulse is 72, weight is stable at 241 pounds.  Breathing is nonlabored.   LUNGS:  Clear to auscultation.   CARDIAC:  Reveals a regular rate and rhythm.  She does have a 2/6 systolic ejection murmur heard best at the upper left hand border.   EXTREMITIES:  Lower extremities show no evidence of edema.   NEUROLOGIC:  Alert and oriented.      ASSESSMENT AND  PLAN:  Ms. Bauer is a pleasant 64-year-old female with a history of what appears to be moderate aortic stenosis, hypertension and coronary artery disease with stent placement to the proximal LAD earlier this year with minimal disease noted in the remainder of the coronary tree, who presents today for followup.  Her previously noted dyspnea on exertion has completely resolved.  She continues to participate in Cardiac  Rehab.  She is tolerating her medication regimen without difficulty.  Again, it does not appear that her cholesterol has been rechecked since being started on atorvastatin.  We will plan to recheck this along with an ALT in the next few weeks at the patient's convenience.      We will plan to repeat an echocardiogram to reassess her aortic stenosis and have her follow up with Dr. Dave this fall as previously recommended.  Of course, I encouraged her to contact us sooner with any questions or concerns.         SYDNI PICKETT PA-C             D: 2019   T: 2019   MT: SIVAKUMAR      Name:     ABBY ASHTON   MRN:      [id number]        Account:      [id number]   :      1955           Service Date: 2019      Document: [id number]       Thank you for allowing me to participate in the care of your patient.      Sincerely,     Sydni Pickett PA-C     [facility]    cc:   Sydni Pickett PA-C  [facility]  [address] [place] [address]

## 2019-06-04 NOTE — PROGRESS NOTES
Service Date: 06/04/2019      HISTORY OF PRESENT ILLNESS:  Ms. Bauer is a pleasant 64-year-old female who presents to the office today for a 3-month followup.      Again, she was referred to the Cardiology office earlier this year after she had an echocardiogram showing moderate to severe aortic stenosis and she had been experiencing dyspnea on exertion.  Due to her history of hypertension and prior tobacco use along with a family history of coronary artery disease, it was recommended she undergo a stress echocardiogram.  This was significantly abnormal and suggested LAD ischemia.  She was referred on for cardiac catheterization and was found to have a severe lesion in the proximal LAD which was treated with drug-eluting stent placement x1.  She was started on dual antiplatelet therapy.        She was seen by Dr. Palmer of CV Surgery due to the aortic stenosis.  He felt her echocardiogram findings were most consistent with moderate aortic stenosis and recommended that she have reevaluation in approximately 6 months.  I saw the patient for followup in March after her coronary angiogram.  At that time, she had noticed an improvement in the dyspnea on exertion, but had not completely resolved.  I asked her to follow up in the office in a couple of months to see how she was progressing.      At this point, the patient states that she is feeling really well.  She denies any dyspnea on exertion.  She is not having any shortness of breath at rest.  She denies any chest discomfort.  No orthopnea, PND or lower extremity edema.  N syncope, presyncope or palpitations.  She is tolerating her medical therapy without difficulty.  It does not look like her cholesterol has been rechecked since she was started on atorvastatin.      CURRENT CARDIAC MEDICATIONS:   1.  Aspirin 81 mg daily.   2.  Atorvastatin 40 mg daily.   3.  Clopidogrel 75 mg daily.   4.  Lisinopril/hydrochlorothiazide 20/12.5 mg once daily.   5.  Toprol-XL 25 mg  half tablet daily.   6.  Sublingual nitro p.r.n.      The remainder of her medications, allergies and review of systems were reviewed and as are documented separately.      PHYSICAL EXAMINATION:   GENERAL:  The patient is a pleasant 64-year-old female who appears her stated age.   VITAL SIGNS:  Her blood pressure is 102/70, pulse is 72, weight is stable at 241 pounds.  Breathing is nonlabored.   LUNGS:  Clear to auscultation.   CARDIAC:  Reveals a regular rate and rhythm.  She does have a 2/6 systolic ejection murmur heard best at the upper left hand border.   EXTREMITIES:  Lower extremities show no evidence of edema.   NEUROLOGIC:  Alert and oriented.      ASSESSMENT AND  PLAN:  Ms. Ashton is a pleasant 64-year-old female with a history of what appears to be moderate aortic stenosis, hypertension and coronary artery disease with stent placement to the proximal LAD earlier this year with minimal disease noted in the remainder of the coronary tree, who presents today for followup.  Her previously noted dyspnea on exertion has completely resolved.  She continues to participate in Cardiac Rehab.  She is tolerating her medication regimen without difficulty.  Again, it does not appear that her cholesterol has been rechecked since being started on atorvastatin.  We will plan to recheck this along with an ALT in the next few weeks at the patient's convenience.      We will plan to repeat an echocardiogram to reassess her aortic stenosis and have her follow up with Dr. Dave this fall as previously recommended.  Of course, I encouraged her to contact us sooner with any questions or concerns.         SYDNI PICKETT PA-C             D: 2019   T: 2019   MT: SIVAKUMAR      Name:     ABBY ASHTON   MRN:      [id number]        Account:      [id number]   :      1955           Service Date: 2019      Document: [id number]

## 2019-06-06 ENCOUNTER — HOSPITAL ENCOUNTER (OUTPATIENT)
Dept: CARDIAC REHAB | Facility: CLINIC | Age: 64
End: 2019-06-06
Attending: INTERNAL MEDICINE
Payer: COMMERCIAL

## 2019-06-06 PROCEDURE — 93798 PHYS/QHP OP CAR RHAB W/ECG: CPT | Performed by: REHABILITATION PRACTITIONER

## 2019-06-06 PROCEDURE — 40000116 ZZH STATISTIC OP CR VISIT: Performed by: REHABILITATION PRACTITIONER

## 2019-06-11 ENCOUNTER — HOSPITAL ENCOUNTER (OUTPATIENT)
Dept: CARDIAC REHAB | Facility: CLINIC | Age: 64
End: 2019-06-11
Attending: INTERNAL MEDICINE
Payer: COMMERCIAL

## 2019-06-11 PROCEDURE — 93798 PHYS/QHP OP CAR RHAB W/ECG: CPT

## 2019-06-11 PROCEDURE — 40000116 ZZH STATISTIC OP CR VISIT

## 2019-06-13 ENCOUNTER — HOSPITAL ENCOUNTER (OUTPATIENT)
Dept: CARDIAC REHAB | Facility: CLINIC | Age: 64
End: 2019-06-13
Attending: INTERNAL MEDICINE
Payer: COMMERCIAL

## 2019-06-13 PROCEDURE — 93798 PHYS/QHP OP CAR RHAB W/ECG: CPT | Performed by: REHABILITATION PRACTITIONER

## 2019-06-13 PROCEDURE — 40000116 ZZH STATISTIC OP CR VISIT: Performed by: REHABILITATION PRACTITIONER

## 2019-06-18 ENCOUNTER — HOSPITAL ENCOUNTER (OUTPATIENT)
Dept: CARDIAC REHAB | Facility: CLINIC | Age: 64
End: 2019-06-18
Attending: INTERNAL MEDICINE
Payer: COMMERCIAL

## 2019-06-18 PROCEDURE — 93798 PHYS/QHP OP CAR RHAB W/ECG: CPT

## 2019-06-18 PROCEDURE — 40000116 ZZH STATISTIC OP CR VISIT

## 2019-06-19 DIAGNOSIS — I25.10 CORONARY ARTERY DISEASE INVOLVING NATIVE CORONARY ARTERY OF NATIVE HEART WITHOUT ANGINA PECTORIS: ICD-10-CM

## 2019-06-19 LAB
ALT SERPL W P-5'-P-CCNC: 34 U/L (ref 0–50)
CHOLEST SERPL-MCNC: 132 MG/DL
HDLC SERPL-MCNC: 63 MG/DL
LDLC SERPL CALC-MCNC: 52 MG/DL
NONHDLC SERPL-MCNC: 69 MG/DL
TRIGL SERPL-MCNC: 85 MG/DL

## 2019-06-19 PROCEDURE — 84460 ALANINE AMINO (ALT) (SGPT): CPT | Performed by: PHYSICIAN ASSISTANT

## 2019-06-19 PROCEDURE — 36415 COLL VENOUS BLD VENIPUNCTURE: CPT | Performed by: PHYSICIAN ASSISTANT

## 2019-06-19 PROCEDURE — 80061 LIPID PANEL: CPT | Performed by: PHYSICIAN ASSISTANT

## 2019-06-25 ENCOUNTER — HOSPITAL ENCOUNTER (OUTPATIENT)
Dept: CARDIAC REHAB | Facility: CLINIC | Age: 64
End: 2019-06-25
Attending: INTERNAL MEDICINE
Payer: COMMERCIAL

## 2019-06-25 PROCEDURE — 93798 PHYS/QHP OP CAR RHAB W/ECG: CPT

## 2019-06-25 PROCEDURE — 40000116 ZZH STATISTIC OP CR VISIT

## 2019-06-27 ENCOUNTER — HOSPITAL ENCOUNTER (OUTPATIENT)
Dept: CARDIAC REHAB | Facility: CLINIC | Age: 64
End: 2019-06-27
Attending: INTERNAL MEDICINE
Payer: COMMERCIAL

## 2019-06-27 PROCEDURE — 93798 PHYS/QHP OP CAR RHAB W/ECG: CPT

## 2019-06-27 PROCEDURE — 40000116 ZZH STATISTIC OP CR VISIT

## 2019-07-02 ENCOUNTER — HOSPITAL ENCOUNTER (OUTPATIENT)
Dept: CARDIAC REHAB | Facility: CLINIC | Age: 64
End: 2019-07-02
Attending: INTERNAL MEDICINE
Payer: COMMERCIAL

## 2019-07-02 PROCEDURE — 40000116 ZZH STATISTIC OP CR VISIT

## 2019-07-02 PROCEDURE — 93798 PHYS/QHP OP CAR RHAB W/ECG: CPT

## 2019-07-09 ENCOUNTER — HOSPITAL ENCOUNTER (OUTPATIENT)
Dept: CARDIAC REHAB | Facility: CLINIC | Age: 64
End: 2019-07-09
Attending: INTERNAL MEDICINE
Payer: COMMERCIAL

## 2019-07-09 PROCEDURE — 40000116 ZZH STATISTIC OP CR VISIT

## 2019-07-09 PROCEDURE — 93798 PHYS/QHP OP CAR RHAB W/ECG: CPT

## 2019-07-11 ENCOUNTER — HOSPITAL ENCOUNTER (OUTPATIENT)
Dept: CARDIAC REHAB | Facility: CLINIC | Age: 64
End: 2019-07-11
Attending: INTERNAL MEDICINE
Payer: COMMERCIAL

## 2019-07-11 PROCEDURE — 40000116 ZZH STATISTIC OP CR VISIT: Performed by: OCCUPATIONAL THERAPIST

## 2019-07-11 PROCEDURE — 93798 PHYS/QHP OP CAR RHAB W/ECG: CPT | Performed by: OCCUPATIONAL THERAPIST

## 2019-07-16 ENCOUNTER — HOSPITAL ENCOUNTER (OUTPATIENT)
Dept: CARDIAC REHAB | Facility: CLINIC | Age: 64
End: 2019-07-16
Attending: INTERNAL MEDICINE
Payer: COMMERCIAL

## 2019-07-16 PROCEDURE — 93798 PHYS/QHP OP CAR RHAB W/ECG: CPT

## 2019-07-16 PROCEDURE — 40000116 ZZH STATISTIC OP CR VISIT

## 2019-07-18 ENCOUNTER — HOSPITAL ENCOUNTER (OUTPATIENT)
Dept: CARDIAC REHAB | Facility: CLINIC | Age: 64
End: 2019-07-18
Attending: INTERNAL MEDICINE
Payer: COMMERCIAL

## 2019-07-18 PROCEDURE — 93798 PHYS/QHP OP CAR RHAB W/ECG: CPT | Performed by: OCCUPATIONAL THERAPIST

## 2019-07-18 PROCEDURE — 40000116 ZZH STATISTIC OP CR VISIT: Performed by: OCCUPATIONAL THERAPIST

## 2019-07-19 ENCOUNTER — OFFICE VISIT (OUTPATIENT)
Dept: URGENT CARE | Facility: URGENT CARE | Age: 64
End: 2019-07-19
Payer: COMMERCIAL

## 2019-07-19 ENCOUNTER — ANCILLARY PROCEDURE (OUTPATIENT)
Dept: GENERAL RADIOLOGY | Facility: CLINIC | Age: 64
End: 2019-07-19
Attending: FAMILY MEDICINE
Payer: COMMERCIAL

## 2019-07-19 VITALS
WEIGHT: 241 LBS | HEART RATE: 85 BPM | RESPIRATION RATE: 16 BRPM | TEMPERATURE: 98.5 F | DIASTOLIC BLOOD PRESSURE: 84 MMHG | OXYGEN SATURATION: 100 % | BODY MASS INDEX: 36.64 KG/M2 | SYSTOLIC BLOOD PRESSURE: 124 MMHG

## 2019-07-19 DIAGNOSIS — Z79.01 ON CHRONIC CLOPIDOGREL THERAPY: ICD-10-CM
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
## 2019-07-19 DIAGNOSIS — S80.02XA CONTUSION OF LEFT KNEE, INITIAL ENCOUNTER: ICD-10-CM

## 2019-07-19 DIAGNOSIS — S20.212A CONTUSION OF LEFT CHEST WALL, INITIAL ENCOUNTER: ICD-10-CM

## 2019-07-19 DIAGNOSIS — S20.01XA POSTTRAUMATIC HEMATOMA OF RIGHT BREAST, INITIAL ENCOUNTER: ICD-10-CM

## 2019-07-19 PROCEDURE — 99214 OFFICE O/P EST MOD 30 MIN: CPT | Performed by: FAMILY MEDICINE

## 2019-07-19 PROCEDURE — 71101 X-RAY EXAM UNILAT RIBS/CHEST: CPT | Mod: LT

## 2019-07-19 PROCEDURE — 71101 X-RAY EXAM UNILAT RIBS/CHEST: CPT | Mod: LT | Performed by: RADIOLOGY

## 2019-07-19 RX ORDER — CYCLOBENZAPRINE HCL 10 MG
5-10 TABLET ORAL 3 TIMES DAILY PRN
Qty: 30 TABLET | Refills: 0 | Status: SHIPPED | OUTPATIENT
Start: 2019-07-19 | End: 2020-01-16
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
## 2019-07-23 ENCOUNTER — HOSPITAL ENCOUNTER (OUTPATIENT)
Dept: CARDIAC REHAB | Facility: CLINIC | Age: 64
End: 2019-07-23
Attending: INTERNAL MEDICINE
Payer: COMMERCIAL

## 2019-07-23 PROCEDURE — 93798 PHYS/QHP OP CAR RHAB W/ECG: CPT

## 2019-07-23 PROCEDURE — 40000116 ZZH STATISTIC OP CR VISIT

## 2019-08-01 ENCOUNTER — HOSPITAL ENCOUNTER (OUTPATIENT)
Dept: CARDIAC REHAB | Facility: CLINIC | Age: 64
End: 2019-08-01
Attending: INTERNAL MEDICINE
Payer: COMMERCIAL

## 2019-08-01 PROCEDURE — 93798 PHYS/QHP OP CAR RHAB W/ECG: CPT | Performed by: OCCUPATIONAL THERAPIST

## 2019-08-01 PROCEDURE — 40000116 ZZH STATISTIC OP CR VISIT: Performed by: OCCUPATIONAL THERAPIST

## 2019-08-08 ENCOUNTER — HOSPITAL ENCOUNTER (OUTPATIENT)
Dept: CARDIAC REHAB | Facility: CLINIC | Age: 64
End: 2019-08-08
Attending: INTERNAL MEDICINE
Payer: COMMERCIAL

## 2019-08-08 PROCEDURE — 40000116 ZZH STATISTIC OP CR VISIT: Performed by: REHABILITATION PRACTITIONER

## 2019-08-08 PROCEDURE — 93798 PHYS/QHP OP CAR RHAB W/ECG: CPT | Performed by: REHABILITATION PRACTITIONER

## 2019-08-15 ENCOUNTER — HOSPITAL ENCOUNTER (OUTPATIENT)
Dept: CARDIAC REHAB | Facility: CLINIC | Age: 64
End: 2019-08-15
Attending: INTERNAL MEDICINE
Payer: COMMERCIAL

## 2019-08-15 PROCEDURE — 40000116 ZZH STATISTIC OP CR VISIT

## 2019-08-15 PROCEDURE — 93798 PHYS/QHP OP CAR RHAB W/ECG: CPT

## 2019-08-20 ENCOUNTER — HOSPITAL ENCOUNTER (OUTPATIENT)
Dept: CARDIAC REHAB | Facility: CLINIC | Age: 64
End: 2019-08-20
Attending: INTERNAL MEDICINE
Payer: COMMERCIAL

## 2019-08-20 PROCEDURE — 93797 PHYS/QHP OP CAR RHAB WO ECG: CPT | Performed by: REHABILITATION PRACTITIONER

## 2019-08-20 PROCEDURE — 93798 PHYS/QHP OP CAR RHAB W/ECG: CPT | Performed by: REHABILITATION PRACTITIONER

## 2019-08-20 PROCEDURE — 40000116 ZZH STATISTIC OP CR VISIT: Performed by: REHABILITATION PRACTITIONER

## 2019-08-20 PROCEDURE — 40000575 ZZH STATISTIC OP CARDIAC VISIT #2: Performed by: REHABILITATION PRACTITIONER

## 2019-09-23 ENCOUNTER — HOSPITAL ENCOUNTER (OUTPATIENT)
Dept: CARDIOLOGY | Facility: CLINIC | Age: 64
Discharge: HOME | End: 2019-09-23
Attending: PHYSICIAN ASSISTANT | Admitting: PHYSICIAN ASSISTANT
Payer: COMMERCIAL

## 2019-09-23 DIAGNOSIS — I35.0 AORTIC VALVE STENOSIS, ETIOLOGY OF CARDIAC VALVE DISEASE UNSPECIFIED: ICD-10-CM

## 2019-09-23 PROCEDURE — 40000264 ECHOCARDIOGRAM COMPLETE

## 2019-09-23 PROCEDURE — 93306 TTE W/DOPPLER COMPLETE: CPT | Mod: 26 | Performed by: INTERNAL MEDICINE

## 2019-09-23 PROCEDURE — 25500064 ZZH RX 255 OP 636: Performed by: PHYSICIAN ASSISTANT

## 2019-09-23 PROCEDURE — 25800025 ZZH RX 258: Performed by: PHYSICIAN ASSISTANT

## 2019-09-23 RX ORDER — ACYCLOVIR 200 MG/1
30 CAPSULE ORAL ONCE
Status: COMPLETED | OUTPATIENT
Start: 2019-09-23 | End: 2019-09-23

## 2019-09-23 RX ADMIN — HUMAN ALBUMIN MICROSPHERES AND PERFLUTREN 7 ML: 10; .22 INJECTION, SOLUTION INTRAVENOUS at 11:00

## 2019-09-23 RX ADMIN — SODIUM CHLORIDE 30 ML: 9 INJECTION, SOLUTION INTRAMUSCULAR; INTRAVENOUS; SUBCUTANEOUS at 10:49

## 2019-10-02 NOTE — RESULT ENCOUNTER NOTE
Echo results from 9/23/19 reviewed w/ pt. Pt is on wait list to see . No further questions at this time. LENA Bowers

## 2019-12-15 ENCOUNTER — HEALTH MAINTENANCE LETTER (OUTPATIENT)
Age: 64
End: 2019-12-15

## 2020-01-14 ENCOUNTER — PRE VISIT (OUTPATIENT)
Dept: CARDIOLOGY | Facility: CLINIC | Age: 65
End: 2020-01-14

## 2020-01-14 NOTE — TELEPHONE ENCOUNTER
Chart Update for OV with Dr. Dave scheduled for 1/16/19.   SAMY Cheek RN, BSN.  01/14/20 12:22 PM

## 2020-01-16 ENCOUNTER — OFFICE VISIT (OUTPATIENT)
Dept: CARDIOLOGY | Facility: CLINIC | Age: 65
End: 2020-01-16
Payer: COMMERCIAL

## 2020-01-16 ENCOUNTER — TELEPHONE (OUTPATIENT)
Dept: CARDIOLOGY | Facility: CLINIC | Age: 65
End: 2020-01-16

## 2020-01-16 VITALS
WEIGHT: 240 LBS | DIASTOLIC BLOOD PRESSURE: 72 MMHG | BODY MASS INDEX: 36.37 KG/M2 | SYSTOLIC BLOOD PRESSURE: 108 MMHG | HEART RATE: 69 BPM | HEIGHT: 68 IN | OXYGEN SATURATION: 98 %

## 2020-01-16 DIAGNOSIS — E66.812 CLASS 2 OBESITY DUE TO EXCESS CALORIES WITHOUT SERIOUS COMORBIDITY WITH BODY MASS INDEX (BMI) OF 36.0 TO 36.9 IN ADULT: ICD-10-CM

## 2020-01-16 DIAGNOSIS — I25.10 CORONARY ARTERY DISEASE INVOLVING NATIVE CORONARY ARTERY OF NATIVE HEART WITHOUT ANGINA PECTORIS: ICD-10-CM

## 2020-01-16 DIAGNOSIS — I10 ESSENTIAL HYPERTENSION, BENIGN: ICD-10-CM

## 2020-01-16 DIAGNOSIS — I35.0 NONRHEUMATIC AORTIC VALVE STENOSIS: Primary | ICD-10-CM

## 2020-01-16 DIAGNOSIS — E66.09 CLASS 2 OBESITY DUE TO EXCESS CALORIES WITHOUT SERIOUS COMORBIDITY WITH BODY MASS INDEX (BMI) OF 36.0 TO 36.9 IN ADULT: ICD-10-CM

## 2020-01-16 DIAGNOSIS — E78.00 HYPERCHOLESTEREMIA: ICD-10-CM

## 2020-01-16 LAB
ANION GAP SERPL CALCULATED.3IONS-SCNC: 4 MMOL/L (ref 3–14)
BUN SERPL-MCNC: 18 MG/DL (ref 7–30)
CALCIUM SERPL-MCNC: 9.5 MG/DL (ref 8.5–10.1)
CHLORIDE SERPL-SCNC: 106 MMOL/L (ref 94–109)
CHOLEST SERPL-MCNC: 142 MG/DL
CO2 SERPL-SCNC: 29 MMOL/L (ref 20–32)
CREAT SERPL-MCNC: 0.8 MG/DL (ref 0.52–1.04)
GFR SERPL CREATININE-BSD FRML MDRD: 78 ML/MIN/{1.73_M2}
GLUCOSE SERPL-MCNC: 98 MG/DL (ref 70–99)
HDLC SERPL-MCNC: 84 MG/DL
LDLC SERPL CALC-MCNC: 49 MG/DL
NONHDLC SERPL-MCNC: 58 MG/DL
POTASSIUM SERPL-SCNC: 4.2 MMOL/L (ref 3.4–5.3)
SODIUM SERPL-SCNC: 139 MMOL/L (ref 133–144)
TRIGL SERPL-MCNC: 44 MG/DL

## 2020-01-16 PROCEDURE — 80048 BASIC METABOLIC PNL TOTAL CA: CPT | Performed by: INTERNAL MEDICINE

## 2020-01-16 PROCEDURE — 80061 LIPID PANEL: CPT | Performed by: INTERNAL MEDICINE

## 2020-01-16 PROCEDURE — 99214 OFFICE O/P EST MOD 30 MIN: CPT | Performed by: INTERNAL MEDICINE

## 2020-01-16 PROCEDURE — 36415 COLL VENOUS BLD VENIPUNCTURE: CPT | Performed by: INTERNAL MEDICINE

## 2020-01-16 RX ORDER — ATORVASTATIN CALCIUM 40 MG/1
40 TABLET, FILM COATED ORAL DAILY
Qty: 90 TABLET | Refills: 3 | Status: SHIPPED | OUTPATIENT
Start: 2020-01-16 | End: 2021-03-08

## 2020-01-16 RX ORDER — LISINOPRIL AND HYDROCHLOROTHIAZIDE 12.5; 2 MG/1; MG/1
1 TABLET ORAL DAILY
Qty: 90 TABLET | Refills: 3 | Status: SHIPPED | OUTPATIENT
Start: 2020-01-16 | End: 2020-07-20

## 2020-01-16 RX ORDER — METOPROLOL SUCCINATE 25 MG/1
12.5 TABLET, EXTENDED RELEASE ORAL DAILY
Qty: 45 TABLET | Refills: 3 | Status: SHIPPED | OUTPATIENT
Start: 2020-01-16 | End: 2021-03-08

## 2020-01-16 ASSESSMENT — MIFFLIN-ST. JEOR: SCORE: 1697

## 2020-01-16 NOTE — LETTER
1/16/2020      Sylvia Bird MD  [address]  [facility], [place] [address]      RE: Heather Callejasjulia       Dear Colleague,    I had the pleasure of seeing Heather Bauer in the [facility].    Service Date: 01/16/2020      HISTORY OF PRESENT ILLNESS:  Mr. Bauer is a very nice 64-year-old woman with past medical history significant for family history of coronary artery disease and a family history of valvular heart disease.  She is a former smoker, having quit in 1999.  She has obesity, hypertension, aortic stenosis and coronary artery disease.      Heather's mother was a smoker and had coronary artery bypass grafting x3 in her 60s.  Her father had valve surgery.  She does not know the details.      I met Heather in 02/2019 because of a chief complaint of dyspnea on exertion.  Echocardiogram demonstrated an ejection fraction of 60% with moderate to severe aortic stenosis, mean gradient of 33, valve area was calculated to be 0.9, dimensionless index was 27, stroke volume was 39 mL/m.  I set her up for a stress echocardiogram, which was positive, leading to stenting of her left anterior descending artery.  She also was seen in the [facility] and it was felt her valve was more in the moderate range and that we would continue with medical management.      Heather returns to clinic.  She states she still gets short of breath if she goes up 2 flights of stairs, but day-to-day activity does not cause shortness of breath.  She does think she got better after the stent was placed.  She is having no bleeding problems on her dual antiplatelet therapy.  She has no formal exercise regimen.  She unfortunately has not lost any weight.  She has no lightheadedness, dizziness, syncope or near-syncope.  She notes no side effects with her current medical regimen.          ASSESSMENT AND PLAN:  I do not think Heather is having any anginal symptoms and I have asked her to finish out her Plavix, after  which we can stop her Plavix and continue on aspirin only.      We did reevaluate her aortic valve and echocardiogram in September, again demonstrated ejection fraction of 60%-65%, mean gradient across the valve of 34, valve area of 0.82 and it appeared to be unchanged from previous study.      She has no symptoms to suggest heart failure or significant arrhythmia.      Blood pressure is very well controlled at 108/72 with a pulse of 69.      Weight unfortunately is 240 pounds, unchanged, giving her a body mass index of 36.5.      Fasting lipid profile has not been checked since .  I will recheck this, but she had a dramatic response to atorvastatin 40 with LDL dropping from 105 to 52 cholesterol, dropping from 191 to 132, triglycerides are 85.  Again, this has not been checked since last , and I will recheck it today.      Basic metabolic profile was not checked since last March, and I will again check that today.      I will follow up on her lab work.  Otherwise, I would plan on her returning in 6 months for repeat echocardiogram and see the ELIZABETH.  I will see her back in a year.  I have told her to call us if she should have any significant change in her shortness of breath.      I emphasized the importance of a regular exercise regimen and weight loss.         MELVINA KIMBROUGH MD, [facility]             D: 2020   T: 2020   MT: [id number]      Name:     ABBY ASHTON   MRN:      [id number]        Account:      [id number]   :      1955           Service Date: 2020      Document: [id number]         Outpatient Encounter Medications as of 2020   Medication Sig Dispense Refill     aspirin 81 MG EC tablet Take 81 mg by mouth daily       atorvastatin (LIPITOR) 40 MG tablet Take 1 tablet (40 mg) by mouth daily 90 tablet 3     clopidogrel (PLAVIX) 75 MG tablet Take 1 tablet (75 mg) by mouth daily 90 tablet 3     lisinopril-hydrochlorothiazide (PRINZIDE/ZESTORETIC) 20-12.5 MG tablet Take 1  "tablet by mouth daily 90 tablet 3     metoprolol succinate ER (TOPROL XL) 25 MG 24 hr tablet Take 0.5 tablets (12.5 mg) by mouth daily 45 tablet 3     MULTIPLE MINERALS-VITAMINS OR TABS        nitroGLYcerin (NITROSTAT) 0.4 MG sublingual tablet One tablet under the tongue every 5 minutes if needed for chest pain. May repeat every 5 minutes for a maximum of 3 doses in 15 minutes\" (Patient not taking: Reported on 1/16/2020) 25 tablet 3     [DISCONTINUED] atorvastatin (LIPITOR) 40 MG tablet Take 1 tablet (40 mg) by mouth daily 90 tablet 3     [DISCONTINUED] cyclobenzaprine (FLEXERIL) 10 MG tablet Take 0.5-1 tablets (5-10 mg) by mouth 3 times daily as needed for muscle spasms (Patient not taking: Reported on 1/16/2020) 30 tablet 0     [DISCONTINUED] lisinopril-hydrochlorothiazide (PRINZIDE/ZESTORETIC) 20-12.5 MG tablet Take 1 tablet by mouth daily 90 tablet 3     [DISCONTINUED] metoprolol succinate ER (TOPROL XL) 25 MG 24 hr tablet Take 0.5 tablets (12.5 mg) by mouth daily 45 tablet 3     No facility-administered encounter medications on file as of 1/16/2020.        Again, thank you for allowing me to participate in the care of your patient.      Sincerely,    Adolph Dave MD     [facility]    "

## 2020-01-16 NOTE — PROGRESS NOTES
Service Date: 01/16/2020      HISTORY OF PRESENT ILLNESS:  Mr. Bauer is a very nice 64-year-old woman with past medical history significant for family history of coronary artery disease and a family history of valvular heart disease.  She is a former smoker, having quit in 1999.  She has obesity, hypertension, aortic stenosis and coronary artery disease.      Heather's mother was a smoker and had coronary artery bypass grafting x3 in her 60s.  Her father had valve surgery.  She does not know the details.      I met Heather in 02/2019 because of a chief complaint of dyspnea on exertion.  Echocardiogram demonstrated an ejection fraction of 60% with moderate to severe aortic stenosis, mean gradient of 33, valve area was calculated to be 0.9, dimensionless index was 27, stroke volume was 39 mL/m.  I set her up for a stress echocardiogram, which was positive, leading to stenting of her left anterior descending artery.  She also was seen in [facility] and it was felt her valve was more in the moderate range and that we would continue with medical management.      Heather returns to clinic.  She states she still gets short of breath if she goes up 2 flights of stairs, but day-to-day activity does not cause shortness of breath.  She does think she got better after the stent was placed.  She is having no bleeding problems on her dual antiplatelet therapy.  She has no formal exercise regimen.  She unfortunately has not lost any weight.  She has no lightheadedness, dizziness, syncope or near-syncope.  She notes no side effects with her current medical regimen.      ASSESSMENT AND PLAN:  I do not think Heather is having any anginal symptoms and I have asked her to finish out her Plavix, after which we can stop her Plavix and continue on aspirin only.      We did reevaluate her aortic valve and echocardiogram in September, again demonstrated ejection fraction of 60%-65%, mean gradient across the valve of 34, valve area of  0.82 and it appeared to be unchanged from previous study.      She has no symptoms to suggest heart failure or significant arrhythmia.      Blood pressure is very well controlled at 108/72 with a pulse of 69.      Weight unfortunately is 240 pounds, unchanged, giving her a body mass index of 36.5.      Fasting lipid profile has not been checked since .  I will recheck this, but she had a dramatic response to atorvastatin 40 with LDL dropping from 105 to 52 cholesterol, dropping from 191 to 132, triglycerides are 85.  Again, this has not been checked since last , and I will recheck it today.      Basic metabolic profile was not checked since last March, and I will again check that today.      I will follow up on her lab work.  Otherwise, I would plan on her returning in 6 months for repeat echocardiogram and see the ELIZABETH.  I will see her back in a year.  I have told her to call us if she should have any significant change in her shortness of breath.      I emphasized the importance of a regular exercise regimen and weight loss.         MELVINA KIMBROUGH MD, [facility]             D: 2020   T: 2020   MT: [id number]      Name:     ABBY ASHTON   MRN:      [id number]        Account:      [id number]   :      1955           Service Date: 2020      Document: [id number]

## 2020-01-16 NOTE — LETTER
1/16/2020    Sylvia Bird MD  [address]  [facility] [address]    RE: Heather Bauer       Dear Colleague,    I had the pleasure of seeing Heather Bauer in the [facility].    HPI and Plan:   See dictation    Orders Placed This Encounter   Procedures     Basic metabolic panel     Lipid Profile     Basic metabolic panel     Lipid Profile     Follow-Up with Cardiac Advanced Practice Provider     Follow-Up with Cardiologist     Echocardiogram Complete     Echocardiogram Complete       Orders Placed This Encounter   Medications     atorvastatin (LIPITOR) 40 MG tablet     Sig: Take 1 tablet (40 mg) by mouth daily     Dispense:  90 tablet     Refill:  3     lisinopril-hydrochlorothiazide (PRINZIDE/ZESTORETIC) 20-12.5 MG tablet     Sig: Take 1 tablet by mouth daily     Dispense:  90 tablet     Refill:  3     metoprolol succinate ER (TOPROL XL) 25 MG 24 hr tablet     Sig: Take 0.5 tablets (12.5 mg) by mouth daily     Dispense:  45 tablet     Refill:  3       Medications Discontinued During This Encounter   Medication Reason     cyclobenzaprine (FLEXERIL) 10 MG tablet Medication Reconciliation Clean Up     atorvastatin (LIPITOR) 40 MG tablet Reorder     lisinopril-hydrochlorothiazide (PRINZIDE/ZESTORETIC) 20-12.5 MG tablet Reorder     metoprolol succinate ER (TOPROL XL) 25 MG 24 hr tablet Reorder         Encounter Diagnoses   Name Primary?     Nonrheumatic aortic valve stenosis Yes     Coronary artery disease involving native coronary artery of native heart without angina pectoris      Essential hypertension, benign      Hypercholesteremia      Class 2 obesity due to excess calories without serious comorbidity with body mass index (BMI) of 36.0 to 36.9 in adult        CURRENT MEDICATIONS:  Current Outpatient Medications   Medication Sig Dispense Refill     aspirin 81 MG EC tablet Take 81 mg by mouth daily       atorvastatin (LIPITOR) 40 MG tablet Take 1 tablet (40 mg) by mouth  "daily 90 tablet 3     clopidogrel (PLAVIX) 75 MG tablet Take 1 tablet (75 mg) by mouth daily 90 tablet 3     lisinopril-hydrochlorothiazide (PRINZIDE/ZESTORETIC) 20-12.5 MG tablet Take 1 tablet by mouth daily 90 tablet 3     metoprolol succinate ER (TOPROL XL) 25 MG 24 hr tablet Take 0.5 tablets (12.5 mg) by mouth daily 45 tablet 3     MULTIPLE MINERALS-VITAMINS OR TABS        nitroGLYcerin (NITROSTAT) 0.4 MG sublingual tablet One tablet under the tongue every 5 minutes if needed for chest pain. May repeat every 5 minutes for a maximum of 3 doses in 15 minutes\" (Patient not taking: Reported on 1/16/2020) 25 tablet 3       ALLERGIES     Allergies   Allergen Reactions     No Known Drug Allergies        PAST MEDICAL HISTORY:  Past Medical History:   Diagnosis Date     Aortic valve stenosis      Essential hypertension, benign      Fracture, tibia, shaft      History of colposcopy with cervical biopsy 2/22/11    AMRITA I     Papanicolaou smear of vagina with atypical squamous cells cannot exclude high grade squamous intraepithelial lesion (ASC-H) 1/31/11       PAST SURGICAL HISTORY:  Past Surgical History:   Procedure Laterality Date     C APPENDECTOMY       CV CORONARY ANGIOGRAM N/A 3/11/2019    Procedure: Coronary Angiogram;  Surgeon: Adolph Dave MD;  Location: [facility] CARDIAC CATH LAB     CV PCI ANGIOPLASTY N/A 3/11/2019    Procedure: Percutaneous Coronary Intervention;  Surgeon: Adolph Dave MD;  Location: [facility] CARDIAC CATH LAB       FAMILY HISTORY:  Family History   Problem Relation Age of Onset     C.A.D. Mother         early 60's     Lipids Mother      Heart Disease Mother 68        heart bypass surgery     Hypertension Mother      Thyroid Disease Mother      Heart Disease Father         valvular disease     Thyroid Disease Paternal Aunt      Heart Disease Maternal Grandmother         heart attack       SOCIAL HISTORY:  Social History     Socioeconomic History     Marital status:      " Spouse name: None     Number of children: 2     Years of education: None     Highest education level: None   Occupational History     Employer: [employer]   Social Needs     Financial resource strain: None     Food insecurity:     Worry: None     Inability: None     Transportation needs:     Medical: None     Non-medical: None   Tobacco Use     Smoking status: Former Smoker     Smokeless tobacco: Never Used     Tobacco comment: quit 20 years ago   Substance and Sexual Activity     Alcohol use: Yes     Comment: wine every week     Drug use: No     Sexual activity: Yes     Birth control/protection: Condom   Lifestyle     Physical activity:     Days per week: None     Minutes per session: None     Stress: None   Relationships     Social connections:     Talks on phone: None     Gets together: None     Attends Pentecostalism service: None     Active member of club or organization: None     Attends meetings of clubs or organizations: None     Relationship status: None     Intimate partner violence:     Fear of current or ex partner: None     Emotionally abused: None     Physically abused: None     Forced sexual activity: None   Other Topics Concern      Service Not Asked     Blood Transfusions Not Asked     Caffeine Concern Not Asked     Occupational Exposure Not Asked     Hobby Hazards Not Asked     Sleep Concern Not Asked     Stress Concern Not Asked     Weight Concern Not Asked     Special Diet Not Asked     Back Care Not Asked     Exercise Yes     Bike Helmet Not Asked     Seat Belt Yes     Self-Exams Not Asked     Parent/sibling w/ CABG, MI or angioplasty before 65F 55M? Not Asked   Social History Narrative     None       Review of Systems:  Skin:  Negative       Eyes:  Negative      ENT:  Negative      Respiratory:  Positive for dyspnea on exertion     Cardiovascular:  Negative      Gastroenterology: Negative      Genitourinary:  Negative      Musculoskeletal:  Negative      Neurologic:  Negative     "  Psychiatric:  Negative      Heme/Lymph/Imm:  Negative      Endocrine:  Negative        Physical Exam:  Vitals: /72 (BP Location: Right arm, Patient Position: Sitting, Cuff Size: Adult Regular)   Pulse 69   Ht 1.727 m (5' 7.99\")   Wt 108.9 kg (240 lb)   LMP 07/07/2008   SpO2 98%   BMI 36.50 kg/m       Constitutional:  cooperative, alert and oriented, well developed, well nourished, in no acute distress obese      Skin:  warm and dry to the touch, no apparent skin lesions or masses noted          Head:  normocephalic, no masses or lesions        Eyes:  pupils equal and round, conjunctivae and lids unremarkable, sclera white, no xanthalasma, EOMS intact, no nystagmus        Lymph:      ENT:  no pallor or cyanosis, dentition good        Neck:  carotid pulses are full and equal bilaterally;no carotid bruit        Respiratory:            Cardiac: regular rhythm       systolic murmur;radiation to the carotid;RUSB;grade 3        pulses full and equal                                        GI:    obese      Extremities and Muscular Skeletal:  no edema;no spinal abnormalities noted;normal muscle strength and tone              Neurological:  no gross motor deficits        Psych:  affect appropriate, oriented to time, person and place        CC  No referring provider defined for this encounter.                Thank you for allowing me to participate in the care of your patient.      Sincerely,     Adolph Dave MD     [facility]    cc:   No referring provider defined for this encounter.        "

## 2020-01-16 NOTE — PROGRESS NOTES
HPI and Plan:   See dictation    Orders Placed This Encounter   Procedures     Basic metabolic panel     Lipid Profile     Basic metabolic panel     Lipid Profile     Follow-Up with Cardiac Advanced Practice Provider     Follow-Up with Cardiologist     Echocardiogram Complete     Echocardiogram Complete       Orders Placed This Encounter   Medications     atorvastatin (LIPITOR) 40 MG tablet     Sig: Take 1 tablet (40 mg) by mouth daily     Dispense:  90 tablet     Refill:  3     lisinopril-hydrochlorothiazide (PRINZIDE/ZESTORETIC) 20-12.5 MG tablet     Sig: Take 1 tablet by mouth daily     Dispense:  90 tablet     Refill:  3     metoprolol succinate ER (TOPROL XL) 25 MG 24 hr tablet     Sig: Take 0.5 tablets (12.5 mg) by mouth daily     Dispense:  45 tablet     Refill:  3       Medications Discontinued During This Encounter   Medication Reason     cyclobenzaprine (FLEXERIL) 10 MG tablet Medication Reconciliation Clean Up     atorvastatin (LIPITOR) 40 MG tablet Reorder     lisinopril-hydrochlorothiazide (PRINZIDE/ZESTORETIC) 20-12.5 MG tablet Reorder     metoprolol succinate ER (TOPROL XL) 25 MG 24 hr tablet Reorder         Encounter Diagnoses   Name Primary?     Nonrheumatic aortic valve stenosis Yes     Coronary artery disease involving native coronary artery of native heart without angina pectoris      Essential hypertension, benign      Hypercholesteremia      Class 2 obesity due to excess calories without serious comorbidity with body mass index (BMI) of 36.0 to 36.9 in adult        CURRENT MEDICATIONS:  Current Outpatient Medications   Medication Sig Dispense Refill     aspirin 81 MG EC tablet Take 81 mg by mouth daily       atorvastatin (LIPITOR) 40 MG tablet Take 1 tablet (40 mg) by mouth daily 90 tablet 3     clopidogrel (PLAVIX) 75 MG tablet Take 1 tablet (75 mg) by mouth daily 90 tablet 3     lisinopril-hydrochlorothiazide (PRINZIDE/ZESTORETIC) 20-12.5 MG tablet Take 1 tablet by mouth daily 90 tablet 3      "metoprolol succinate ER (TOPROL XL) 25 MG 24 hr tablet Take 0.5 tablets (12.5 mg) by mouth daily 45 tablet 3     MULTIPLE MINERALS-VITAMINS OR TABS        nitroGLYcerin (NITROSTAT) 0.4 MG sublingual tablet One tablet under the tongue every 5 minutes if needed for chest pain. May repeat every 5 minutes for a maximum of 3 doses in 15 minutes\" (Patient not taking: Reported on 1/16/2020) 25 tablet 3       ALLERGIES     Allergies   Allergen Reactions     No Known Drug Allergies        PAST MEDICAL HISTORY:  Past Medical History:   Diagnosis Date     Aortic valve stenosis      Essential hypertension, benign      Fracture, tibia, shaft      History of colposcopy with cervical biopsy 2/22/11    AMRITA I     Papanicolaou smear of vagina with atypical squamous cells cannot exclude high grade squamous intraepithelial lesion (ASC-H) 1/31/11       PAST SURGICAL HISTORY:  Past Surgical History:   Procedure Laterality Date     C APPENDECTOMY       CV CORONARY ANGIOGRAM N/A 3/11/2019    Procedure: Coronary Angiogram;  Surgeon: Adolph Dave MD;  Location: [facility] CARDIAC CATH LAB     CV PCI ANGIOPLASTY N/A 3/11/2019    Procedure: Percutaneous Coronary Intervention;  Surgeon: Adolph Dave MD;  Location: [facility] CARDIAC CATH LAB       FAMILY HISTORY:  Family History   Problem Relation Age of Onset     C.A.D. Mother         early 60's     Lipids Mother      Heart Disease Mother 68        heart bypass surgery     Hypertension Mother      Thyroid Disease Mother      Heart Disease Father         valvular disease     Thyroid Disease Paternal Aunt      Heart Disease Maternal Grandmother         heart attack       SOCIAL HISTORY:  Social History     Socioeconomic History     Marital status:      Spouse name: None     Number of children: 2     Years of education: None     Highest education level: None   Occupational History     Employer: [employer]   Social Needs     Financial resource strain: None     Food insecurity: " "    Worry: None     Inability: None     Transportation needs:     Medical: None     Non-medical: None   Tobacco Use     Smoking status: Former Smoker     Smokeless tobacco: Never Used     Tobacco comment: quit 20 years ago   Substance and Sexual Activity     Alcohol use: Yes     Comment: wine every week     Drug use: No     Sexual activity: Yes     Birth control/protection: Condom   Lifestyle     Physical activity:     Days per week: None     Minutes per session: None     Stress: None   Relationships     Social connections:     Talks on phone: None     Gets together: None     Attends Hindu service: None     Active member of club or organization: None     Attends meetings of clubs or organizations: None     Relationship status: None     Intimate partner violence:     Fear of current or ex partner: None     Emotionally abused: None     Physically abused: None     Forced sexual activity: None   Other Topics Concern      Service Not Asked     Blood Transfusions Not Asked     Caffeine Concern Not Asked     Occupational Exposure Not Asked     Hobby Hazards Not Asked     Sleep Concern Not Asked     Stress Concern Not Asked     Weight Concern Not Asked     Special Diet Not Asked     Back Care Not Asked     Exercise Yes     Bike Helmet Not Asked     Seat Belt Yes     Self-Exams Not Asked     Parent/sibling w/ CABG, MI or angioplasty before 65F 55M? Not Asked   Social History Narrative     None       Review of Systems:  Skin:  Negative       Eyes:  Negative      ENT:  Negative      Respiratory:  Positive for dyspnea on exertion     Cardiovascular:  Negative      Gastroenterology: Negative      Genitourinary:  Negative      Musculoskeletal:  Negative      Neurologic:  Negative      Psychiatric:  Negative      Heme/Lymph/Imm:  Negative      Endocrine:  Negative        Physical Exam:  Vitals: /72 (BP Location: Right arm, Patient Position: Sitting, Cuff Size: Adult Regular)   Pulse 69   Ht 1.727 m (5' 7.99\")  "  Wt 108.9 kg (240 lb)   LMP 07/07/2008   SpO2 98%   BMI 36.50 kg/m      Constitutional:  cooperative, alert and oriented, well developed, well nourished, in no acute distress obese      Skin:  warm and dry to the touch, no apparent skin lesions or masses noted          Head:  normocephalic, no masses or lesions        Eyes:  pupils equal and round, conjunctivae and lids unremarkable, sclera white, no xanthalasma, EOMS intact, no nystagmus        Lymph:      ENT:  no pallor or cyanosis, dentition good        Neck:  carotid pulses are full and equal bilaterally;no carotid bruit        Respiratory:            Cardiac: regular rhythm       systolic murmur;radiation to the carotid;RUSB;grade 3        pulses full and equal                                        GI:    obese      Extremities and Muscular Skeletal:  no edema;no spinal abnormalities noted;normal muscle strength and tone              Neurological:  no gross motor deficits        Psych:  affect appropriate, oriented to time, person and place        CC  No referring provider defined for this encounter.

## 2020-01-16 NOTE — LETTER
January 20, 2020       TO: Heatherluiza Mcadamsromel   [address] [facility] [address]       Dear Ms. Bauer,    The results of your recent Laboratory tests.      Results for orders placed or performed in visit on 01/16/20   Lipid Profile     Status: None   Result Value Ref Range    Cholesterol 142 <200 mg/dL    Triglycerides 44 <150 mg/dL    HDL Cholesterol 84 >49 mg/dL    LDL Cholesterol Calculated 49 <100 mg/dL    Non HDL Cholesterol 58 <130 mg/dL   Basic metabolic panel     Status: None   Result Value Ref Range    Sodium 139 133 - 144 mmol/L    Potassium 4.2 3.4 - 5.3 mmol/L    Chloride 106 94 - 109 mmol/L    Carbon Dioxide 29 20 - 32 mmol/L    Anion Gap 4 3 - 14 mmol/L    Glucose 98 70 - 99 mg/dL    Urea Nitrogen 18 7 - 30 mg/dL    Creatinine 0.80 0.52 - 1.04 mg/dL    GFR Estimate 78 >60 mL/min/[1.73_m2]    GFR Estimate If Black >90 >60 mL/min/[1.73_m2]    Calcium 9.5 8.5 - 10.1 mg/dL       Your test results fall within the expected range(s) or remain unchanged from previous results.  Please continue with current treatment plan.      Sincerely,    [facility]

## 2020-07-14 ENCOUNTER — TELEPHONE (OUTPATIENT)
Dept: CARDIOLOGY | Facility: CLINIC | Age: 65
End: 2020-07-14

## 2020-07-14 NOTE — TELEPHONE ENCOUNTER
Spoke with Chela [employer] who will again try to message Team 2 and Dr. Dave regarding insurance denial for echo for  Non rheumatic aortic valve disease.

## 2020-07-14 NOTE — TELEPHONE ENCOUNTER
Chela from  Financial Service called. Echo currently scheduled 7- has been deined by Insurance.   Peer to Perr needed. Phone number [phone].    Dr. Dave visit scheduled 6-.

## 2020-07-14 NOTE — TELEPHONE ENCOUNTER
Message from Financial team:  The diagnosis codes of I25.10 and  R94.39 were approved, and patient's Echo was approved by [employer].   Thanks for you help !

## 2020-07-16 ENCOUNTER — HOSPITAL ENCOUNTER (OUTPATIENT)
Dept: CARDIOLOGY | Facility: CLINIC | Age: 65
Discharge: HOME | End: 2020-07-16
Attending: INTERNAL MEDICINE | Admitting: INTERNAL MEDICINE
Payer: COMMERCIAL

## 2020-07-16 DIAGNOSIS — I35.0 NONRHEUMATIC AORTIC VALVE STENOSIS: ICD-10-CM

## 2020-07-16 PROCEDURE — 40000264 ECHOCARDIOGRAM COMPLETE

## 2020-07-16 PROCEDURE — 93306 TTE W/DOPPLER COMPLETE: CPT | Mod: 26 | Performed by: INTERNAL MEDICINE

## 2020-07-16 PROCEDURE — 25500064 ZZH RX 255 OP 636: Performed by: INTERNAL MEDICINE

## 2020-07-16 RX ADMIN — HUMAN ALBUMIN MICROSPHERES AND PERFLUTREN 3 ML: 10; .22 INJECTION, SOLUTION INTRAVENOUS at 10:37

## 2020-07-20 ENCOUNTER — VIRTUAL VISIT (OUTPATIENT)
Dept: CARDIOLOGY | Facility: CLINIC | Age: 65
End: 2020-07-20
Attending: INTERNAL MEDICINE
Payer: COMMERCIAL

## 2020-07-20 VITALS — BODY MASS INDEX: 35.28 KG/M2 | WEIGHT: 232 LBS

## 2020-07-20 DIAGNOSIS — I35.0 NONRHEUMATIC AORTIC VALVE STENOSIS: ICD-10-CM

## 2020-07-20 DIAGNOSIS — I25.10 CORONARY ARTERY DISEASE INVOLVING NATIVE CORONARY ARTERY OF NATIVE HEART WITHOUT ANGINA PECTORIS: Primary | ICD-10-CM

## 2020-07-20 PROCEDURE — 99214 OFFICE O/P EST MOD 30 MIN: CPT | Mod: 95 | Performed by: PHYSICIAN ASSISTANT

## 2020-07-20 RX ORDER — FUROSEMIDE 20 MG
20 TABLET ORAL DAILY
Qty: 90 TABLET | Refills: 0 | Status: SHIPPED | OUTPATIENT
Start: 2020-07-20 | End: 2020-08-12

## 2020-07-20 RX ORDER — LISINOPRIL 20 MG/1
20 TABLET ORAL DAILY
Qty: 90 TABLET | Refills: 0 | Status: SHIPPED | OUTPATIENT
Start: 2020-07-20 | End: 2020-08-12

## 2020-07-20 NOTE — PROGRESS NOTES
"Heather Bauer is a 65 year old female who is being evaluated via a billable video visit.      The patient has been notified of following:     \"This video visit will be conducted via a call between you and your physician/provider. We have found that certain health care needs can be provided without the need for an in-person physical exam.  This service lets us provide the care you need with a video conversation.  If a prescription is necessary we can send it directly to your pharmacy.  If lab work is needed we can place an order for that and you can then stop by our lab to have the test done at a later time.    Video visits are billed at different rates depending on your insurance coverage.  Please reach out to your insurance provider with any questions.    If during the course of the call the physician/provider feels a video visit is not appropriate, you will not be charged for this service.\"    Patient has given verbal consent for Video visit?  Yes  How would you like to obtain your AVS? MyChart  If you are dropped from the video visit, the video invite should be resent to: Text to cell phone: [phone]  Will anyone else be joining your video visit? No     Review Of Systems  Skin: NEGATIVE  Eyes:Ears/Nose/Throat: NEGATIVE  Respiratory: BORJA  Cardiovascular: no issues,edema under control  Gastrointestinal: NEGATIVE  Genitourinary:NEGATIVE   Musculoskeletal: NEGATIVE  Neurologic: NEGATIVE  Psychiatric: NEGATIVE  Hematologic/Lymphatic/Immunologic: NEGATIVE  Endocrine:  NEGATIVE  Vitals:   BP and Pulse  Not taken today.  Weight 232lbs    Telephone number of patient: [phone]      Video-Visit Details    Type of service:  Video Visit    Video Start Time: 11:14 AM  Video End Time: 11:40 AM    Originating Location (pt. Location): Home    Distant Location (provider location):  [facility]     Platform used for Video Visit: Richardson Nolasco LPN      Cardiology Clinic " Progress Note    Heather Bauer MRN# [id number]   YOB: 1955 Age: 65 year old   Primary cardiologist: Dr. Dave         Assessment and Plan:     In summary, Heather Bauer presents today for a 6 month follow up video visit.    1. Moderate-severe aortic stenosis, stable on recent TTE  2. Mild PHTN  3. Mildly progressive BORJA and orthopnea  4. Single-vessel CAD s/p PCI of 80% prox LAD lesion 3/2019  5. Atypical chest discomfort    Plan:  - Stop lisinopril/hctz and start lisinopril and furosemide 20 mg daily to see if this improves her BORJA and orthopnea. May need to up-titrate furosemide however I want to be able to monitor her BP closely in this case. She agrees to obtain an [employer] home BP cuff now.   - If no symptomatic improvement with cautious diuresis, will consider stress echocardiogram to reassess aortic valve and CAD.   - We will touch base again in two weeks and the day prior to our visit she will have a BMP drawn at the [facility].   - Repeat TTE in 6 months, Dr. Dave thereafter.         History of Presenting Illness:      Heather Bauer is a pleasant 65 year old patient who presents today for a routine follow up video visit.    The patient has a history of the following -   She is a former smoker, having quit in 1999.  She has obesity, hypertension, aortic stenosis and coronary artery disease.     She met Dr. Dave in 02/2019 because of a chief complaint of dyspnea on exertion.  Echocardiogram demonstrated an ejection fraction of 60% with moderate to severe aortic stenosis, mean gradient of 33, valve area was calculated to be 0.9, dimensionless index was 27, stroke volume was 39 mL/m.  A stress echocardiogram was arranged, which was positive for ischemia, leading to stenting of her left anterior descending artery in March.  She also was seen in the [facility] and it was felt her valve was more in the moderate range and that we would continue with medical management. We did  reevaluate her aortic valve and echocardiogram in September, again demonstrated ejection fraction of 60%-65%, mean gradient across the valve of 34, valve area of 0.82 and it appeared to be unchanged from previous study.     Her most recent ECHO was performed 7/16/2020 and again, largely unchanged from her priors:  Moderate to severe valvular aortic stenosis, mean gradient 35 mmHg, YUKO 0.8 cm2. Very similar to most recent study.  There is mild (1+) tricuspid regurgitation.  Right ventricular systolic pressure is elevated, consistent with mild pulmonary hypertension.  The visual ejection fraction is estimated at 55-60%.    Today, Heather tells me that her BORJA has been stable, or perhaps mildly progressive, since her last visit. She is also experiencing mild orthopnea, which is new. She's had intermittent short episodes of chest discomfort associated with positional changes which sounds very atypical in nature. No near-syncope or syncope. Patient otherwise denies chest pain, PND, edema, claudication, palpitations. Denies weight gain - in fact, if anything, she's lost weight. She does not have a BP cuff at home.           Review of Systems:     12-pt ROS is negative except for as noted in the HPI.          Physical Exam:     Vitals: LMP 07/07/2008   Wt Readings from Last 10 Encounters:   01/16/20 108.9 kg (240 lb)   07/19/19 109.3 kg (241 lb)   06/04/19 109.6 kg (241 lb 9.6 oz)   04/23/19 109.9 kg (242 lb 3.2 oz)   03/21/19 108.9 kg (240 lb 1.6 oz)   03/11/19 109.8 kg (242 lb)   02/13/19 109.8 kg (242 lb)   02/01/19 109.8 kg (242 lb 1.6 oz)   04/10/18 107.4 kg (236 lb 12.8 oz)   11/30/17 108.6 kg (239 lb 8 oz)       Constitutional:  Patient is pleasant, alert, cooperative, and in NAD.  HEENT:  NCAT. PERRLA. EOM's intact.   Pulmonary: Normal respiratory effort.   Cardiac: CVP appears normal on limited video exam.  Extremities: No edema, erythema, cyanosis or tenderness appreciated.  Skin:  No rashes or lesions  appreciated.   Neurological:  No gross motor or sensory deficits.   Psych: Appropriate affect.        Data:     Labs reviewed:  Recent Labs   Lab Test 01/16/20  1037 06/19/19  0823 02/01/19  1117   LDL 49 52 105*   HDL 84 63 71   NHDL 58 69 120   CHOL 142 132 191   TRIG 44 85 76       Lab Results   Component Value Date    WBC 6.0 03/11/2019    RBC 4.43 03/11/2019    HGB 14.4 03/11/2019    HCT 42.0 03/11/2019    MCV 95 03/11/2019    MCH 32.5 03/11/2019    MCHC 34.3 03/11/2019    RDW 12.4 03/11/2019     03/11/2019       Lab Results   Component Value Date     01/16/2020    POTASSIUM 4.2 01/16/2020    CHLORIDE 106 01/16/2020    CO2 29 01/16/2020    ANIONGAP 4 01/16/2020    GLC 98 01/16/2020    BUN 18 01/16/2020    CR 0.80 01/16/2020    GFRESTIMATED 78 01/16/2020    GFRESTBLACK >90 01/16/2020    RALPH 9.5 01/16/2020      Lab Results   Component Value Date    AST 18 03/11/2019    ALT 34 06/19/2019       Lab Results   Component Value Date    A1C 5.7 (H) 03/11/2019       Lab Results   Component Value Date    INR 0.96 03/11/2019           Problem List:     Patient Active Problem List   Diagnosis     Essential hypertension, benign     HCD (health care directive)     CARDIOVASCULAR SCREENING; LDL GOAL LESS THAN 130     Class 2 obesity due to excess calories without serious comorbidity with body mass index (BMI) of 36.0 to 36.9 in adult     BORJA (dyspnea on exertion)     Nonrheumatic aortic valve stenosis     Abnormal stress test     Coronary artery disease involving native coronary artery of native heart without angina pectoris     Hypercholesteremia           Medications:     Current Outpatient Medications   Medication Sig Dispense Refill     aspirin 81 MG EC tablet Take 81 mg by mouth daily       atorvastatin (LIPITOR) 40 MG tablet Take 1 tablet (40 mg) by mouth daily 90 tablet 3     clopidogrel (PLAVIX) 75 MG tablet Take 1 tablet (75 mg) by mouth daily 90 tablet 3     lisinopril-hydrochlorothiazide  "(PRINZIDE/ZESTORETIC) 20-12.5 MG tablet Take 1 tablet by mouth daily 90 tablet 3     metoprolol succinate ER (TOPROL XL) 25 MG 24 hr tablet Take 0.5 tablets (12.5 mg) by mouth daily 45 tablet 3     MULTIPLE MINERALS-VITAMINS OR TABS        nitroGLYcerin (NITROSTAT) 0.4 MG sublingual tablet One tablet under the tongue every 5 minutes if needed for chest pain. May repeat every 5 minutes for a maximum of 3 doses in 15 minutes\" (Patient not taking: Reported on 1/16/2020) 25 tablet 3           Past Medical History:     Past Medical History:   Diagnosis Date     Aortic valve stenosis      Essential hypertension, benign      Fracture, tibia, shaft      History of colposcopy with cervical biopsy 2/22/11    AMRITA I     Papanicolaou smear of vagina with atypical squamous cells cannot exclude high grade squamous intraepithelial lesion (ASC-H) 1/31/11     Past Surgical History:   Procedure Laterality Date     C APPENDECTOMY       CV CORONARY ANGIOGRAM N/A 3/11/2019    Procedure: Coronary Angiogram;  Surgeon: Adolph Dave MD;  Location: [facility] CARDIAC CATH LAB     CV PCI ANGIOPLASTY N/A 3/11/2019    Procedure: Percutaneous Coronary Intervention;  Surgeon: Adolph Dave MD;  Location: [facility] CARDIAC CATH LAB     Family History   Problem Relation Age of Onset     C.A.D. Mother         early 60's     Lipids Mother      Heart Disease Mother 68        heart bypass surgery     Hypertension Mother      Thyroid Disease Mother      Heart Disease Father         valvular disease     Thyroid Disease Paternal Aunt      Heart Disease Maternal Grandmother         heart attack     Social History     Socioeconomic History     Marital status:      Spouse name: Not on file     Number of children: 2     Years of education: Not on file     Highest education level: Not on file   Occupational History     Employer: [employer]   Social Needs     Financial resource strain: Not on file     Food insecurity     Worry: Not on file     " Inability: Not on file     Transportation needs     Medical: Not on file     Non-medical: Not on file   Tobacco Use     Smoking status: Former Smoker     Smokeless tobacco: Never Used     Tobacco comment: quit 20 years ago   Substance and Sexual Activity     Alcohol use: Yes     Comment: wine every week     Drug use: No     Sexual activity: Yes     Birth control/protection: Condom   Lifestyle     Physical activity     Days per week: Not on file     Minutes per session: Not on file     Stress: Not on file   Relationships     Social connections     Talks on phone: Not on file     Gets together: Not on file     Attends Worship service: Not on file     Active member of club or organization: Not on file     Attends meetings of clubs or organizations: Not on file     Relationship status: Not on file     Intimate partner violence     Fear of current or ex partner: Not on file     Emotionally abused: Not on file     Physically abused: Not on file     Forced sexual activity: Not on file   Other Topics Concern      Service Not Asked     Blood Transfusions Not Asked     Caffeine Concern Not Asked     Occupational Exposure Not Asked     Hobby Hazards Not Asked     Sleep Concern Not Asked     Stress Concern Not Asked     Weight Concern Not Asked     Special Diet Not Asked     Back Care Not Asked     Exercise Yes     Bike Helmet Not Asked     Seat Belt Yes     Self-Exams Not Asked     Parent/sibling w/ CABG, MI or angioplasty before 65F 55M? Not Asked   Social History Narrative     Not on file           Allergies:   No known drug allergies      Smitha Solis PA-C  [facility]  Pager: [phone]

## 2020-07-20 NOTE — PROGRESS NOTES
"Heather Bauer is a 65 year old female who is being evaluated via a billable video visit.      The patient has been notified of following:     \"This video visit will be conducted via a call between you and your physician/provider. We have found that certain health care needs can be provided without the need for an in-person physical exam.  This service lets us provide the care you need with a video conversation.  If a prescription is necessary we can send it directly to your pharmacy.  If lab work is needed we can place an order for that and you can then stop by our lab to have the test done at a later time.    Video visits are billed at different rates depending on your insurance coverage.  Please reach out to your insurance provider with any questions.    If during the course of the call the physician/provider feels a video visit is not appropriate, you will not be charged for this service.\"    Patient has given verbal consent for Video visit?  Yes  How would you like to obtain your AVS? MyChart  If you are dropped from the video visit, the video invite should be resent to: Text to cell phone: [phone]  Will anyone else be joining your video visit? No  {If patient encounters technical issues they should call [phone] :[id number]}   Review Of Systems  Skin: NEGATIVE  Eyes:Ears/Nose/Throat: NEGATIVE  Respiratory: BORJA  Cardiovascular: no issues,edema under control  Gastrointestinal: NEGATIVE  Genitourinary:NEGATIVE   Musculoskeletal: NEGATIVE  Neurologic: NEGATIVE  Psychiatric: NEGATIVE  Hematologic/Lymphatic/Immunologic: NEGATIVE  Endocrine:  NEGATIVE  Vitals:   BP and Pulse  Not taken today.  Weight 232lbs    Telephone number of patient: [phone]      Video-Visit Details    Type of service:  Video Visit    Video Start Time: {video visit start/end time:152948}  Video End Time: {video visit start/end time:152948}    Originating Location (pt. Location): Home    Distant Location (provider location):  " [facility]     Platform used for Video Visit: Richardson Nolasco LPN MA Review of Systems:  ***  Patient-reported vitals:  ***      Cardiology Clinic Progress Note    Heather Bauer MRN# [id number]   YOB: 1955 Age: 65 year old   Primary cardiologist: Dr. Dave         Assessment and Plan:     In summary, Heather Bauer presents today for a follow video visit.    1. ***  2. ***  3. ***    Plan:  -     Follow-up:  -         History of Presenting Illness:      Heather Bauer is a pleasant 65 year old patient who presents today ***.    The patient has a history of the following -   She is a former smoker, having quit in 1999.  She has obesity, hypertension, aortic stenosis and coronary artery disease.     She met Dr. Dave  in 02/2019 because of a chief complaint of dyspnea on exertion.  Echocardiogram demonstrated an ejection fraction of 60% with moderate to severe aortic stenosis, mean gradient of 33, valve area was calculated to be 0.9, dimensionless index was 27, stroke volume was 39 mL/m.  I set her up for a stress echocardiogram, which was positive, leading to stenting of her left anterior descending artery.  She also was seen in the [facility] and it was felt her valve was more in the moderate range and that we would continue with medical management. We did reevaluate her aortic valve and echocardiogram in September, again demonstrated ejection fraction of 60%-65%, mean gradient across the valve of 34, valve area of 0.82 and it appeared to be unchanged from previous study.     Her most recent ECHO was performed 7/16/2020 and again, largely unchanged from her priors:  Moderate to severe valvular aortic stenosis, mean gradient 35 mmHg, YUKO 0.8  cm2. Very similar to most recent study.  There is mild (1+) tricuspid regurgitation.  Right ventricular systolic pressure is elevated, consistent with mild  pulmonary hypertension.  The visual  ejection fraction is estimated at 55-60%.    Today, ***. Patient denies chest pain, shortness of breath, PND, orthopnea, edema, claudication, palpitations, near syncope or syncope.          Review of Systems:     12-pt ROS is negative except for as noted in the HPI.          Physical Exam:     Vitals: LMP 07/07/2008   Wt Readings from Last 10 Encounters:   01/16/20 108.9 kg (240 lb)   07/19/19 109.3 kg (241 lb)   06/04/19 109.6 kg (241 lb 9.6 oz)   04/23/19 109.9 kg (242 lb 3.2 oz)   03/21/19 108.9 kg (240 lb 1.6 oz)   03/11/19 109.8 kg (242 lb)   02/13/19 109.8 kg (242 lb)   02/01/19 109.8 kg (242 lb 1.6 oz)   04/10/18 107.4 kg (236 lb 12.8 oz)   11/30/17 108.6 kg (239 lb 8 oz)       Constitutional:  Patient is pleasant, alert, cooperative, and in NAD.  HEENT:  NCAT. PERRLA. EOM's intact.   Neck:  CVP appears normal. No carotid bruits.   Pulmonary: Normal respiratory effort. CTAB.   Cardiac: RRR, normal S1/S2, no S3/S4, no murmur or rub.   Abdomen:  Non-tender abdomen, no hepatosplenomegaly appreciated.   Vascular: Pulses in the upper and lower extremities are 2+ and equal bilaterally.  Extremities: No edema, erythema, cyanosis or tenderness appreciated.  Skin:  No rashes or lesions appreciated.   Neurological:  No gross motor or sensory deficits.   Psych: Appropriate affect.        Data:     Cardiac Diagnostics reviewed:  Type Date Result   TTE          Cath     Stress     EKG     Device     Other       Labs reviewed:  Recent Labs   Lab Test 01/16/20  1037 06/19/19  0823 02/01/19  1117   LDL 49 52 105*   HDL 84 63 71   NHDL 58 69 120   CHOL 142 132 191   TRIG 44 85 76       Lab Results   Component Value Date    WBC 6.0 03/11/2019    RBC 4.43 03/11/2019    HGB 14.4 03/11/2019    HCT 42.0 03/11/2019    MCV 95 03/11/2019    MCH 32.5 03/11/2019    MCHC 34.3 03/11/2019    RDW 12.4 03/11/2019     03/11/2019       Lab Results   Component Value Date     01/16/2020    POTASSIUM 4.2 01/16/2020    CHLORIDE 106  "01/16/2020    CO2 29 01/16/2020    ANIONGAP 4 01/16/2020    GLC 98 01/16/2020    BUN 18 01/16/2020    CR 0.80 01/16/2020    GFRESTIMATED 78 01/16/2020    GFRESTBLACK >90 01/16/2020    RALPH 9.5 01/16/2020      Lab Results   Component Value Date    AST 18 03/11/2019    ALT 34 06/19/2019       Lab Results   Component Value Date    A1C 5.7 (H) 03/11/2019       Lab Results   Component Value Date    INR 0.96 03/11/2019           Problem List:     Patient Active Problem List   Diagnosis     Essential hypertension, benign     HCD (health care directive)     CARDIOVASCULAR SCREENING; LDL GOAL LESS THAN 130     Class 2 obesity due to excess calories without serious comorbidity with body mass index (BMI) of 36.0 to 36.9 in adult     BORJA (dyspnea on exertion)     Nonrheumatic aortic valve stenosis     Abnormal stress test     Coronary artery disease involving native coronary artery of native heart without angina pectoris     Hypercholesteremia           Medications:     Current Outpatient Medications   Medication Sig Dispense Refill     aspirin 81 MG EC tablet Take 81 mg by mouth daily       atorvastatin (LIPITOR) 40 MG tablet Take 1 tablet (40 mg) by mouth daily 90 tablet 3     clopidogrel (PLAVIX) 75 MG tablet Take 1 tablet (75 mg) by mouth daily 90 tablet 3     lisinopril-hydrochlorothiazide (PRINZIDE/ZESTORETIC) 20-12.5 MG tablet Take 1 tablet by mouth daily 90 tablet 3     metoprolol succinate ER (TOPROL XL) 25 MG 24 hr tablet Take 0.5 tablets (12.5 mg) by mouth daily 45 tablet 3     MULTIPLE MINERALS-VITAMINS OR TABS        nitroGLYcerin (NITROSTAT) 0.4 MG sublingual tablet One tablet under the tongue every 5 minutes if needed for chest pain. May repeat every 5 minutes for a maximum of 3 doses in 15 minutes\" (Patient not taking: Reported on 1/16/2020) 25 tablet 3           Past Medical History:     Past Medical History:   Diagnosis Date     Aortic valve stenosis      Essential hypertension, benign      Fracture, tibia, " shaft      History of colposcopy with cervical biopsy 2/22/11    AMRITA I     Papanicolaou smear of vagina with atypical squamous cells cannot exclude high grade squamous intraepithelial lesion (ASC-H) 1/31/11     Past Surgical History:   Procedure Laterality Date     C APPENDECTOMY       CV CORONARY ANGIOGRAM N/A 3/11/2019    Procedure: Coronary Angiogram;  Surgeon: Adolph Dave MD;  Location:  [facility]     CV PCI ANGIOPLASTY N/A 3/11/2019    Procedure: Percutaneous Coronary Intervention;  Surgeon: Adolph Dave MD;  Location:  [facility]ARD[facility] CATH LAB     Family History   Problem Relation Age of Onset     C.A.D. Mother         early 60's     Lipids Mother      Heart Disease Mother 68        heart bypass surgery     Hypertension Mother      Thyroid Disease Mother      Heart Disease Father         valvular disease     Thyroid Disease Paternal Aunt      Heart Disease Maternal Grandmother         heart attack     Social History     Socioeconomic History     Marital status:      Spouse name: Not on file     Number of children: 2     Years of education: Not on file     Highest education level: Not on file   Occupational History     Employer: [employer]   Social Needs     Financial resource strain: Not on file     Food insecurity     Worry: Not on file     Inability: Not on file     Transportation needs     Medical: Not on file     Non-medical: Not on file   Tobacco Use     Smoking status: Former Smoker     Smokeless tobacco: Never Used     Tobacco comment: quit 20 years ago   Substance and Sexual Activity     Alcohol use: Yes     Comment: wine every week     Drug use: No     Sexual activity: Yes     Birth control/protection: Condom   Lifestyle     Physical activity     Days per week: Not on file     Minutes per session: Not on file     Stress: Not on file   Relationships     Social connections     Talks on phone: Not on file     Gets together: Not on file     Attends Baptism service:  Not on file     Active member of club or organization: Not on file     Attends meetings of clubs or organizations: Not on file     Relationship status: Not on file     Intimate partner violence     Fear of current or ex partner: Not on file     Emotionally abused: Not on file     Physically abused: Not on file     Forced sexual activity: Not on file   Other Topics Concern      Service Not Asked     Blood Transfusions Not Asked     Caffeine Concern Not Asked     Occupational Exposure Not Asked     Hobby Hazards Not Asked     Sleep Concern Not Asked     Stress Concern Not Asked     Weight Concern Not Asked     Special Diet Not Asked     Back Care Not Asked     Exercise Yes     Bike Helmet Not Asked     Seat Belt Yes     Self-Exams Not Asked     Parent/sibling w/ CABG, MI or angioplasty before 65F 55M? Not Asked   Social History Narrative     Not on file           Allergies:   No known drug allergies      Smitha Solis PA-C  [facility]  Pager: [phone]

## 2020-07-20 NOTE — LETTER
7/20/2020    Sylvia Bird MD  [address]  [facility] [address]    RE: Heather Bauer       Dear Colleague,    I had the pleasure of seeing Heather Bauer in the [facility].    Heather Bauer is a 65 year old female who is being evaluated via a billable video visit.      Cardiology Clinic Progress Note    Heather Bauer MRN# [id number]   YOB: 1955 Age: 65 year old   Primary cardiologist: Dr. Dave         Assessment and Plan:     In summary, Heather Bauer presents today for a 6 month follow up video visit.    1. Moderate-severe aortic stenosis, stable on recent TTE  2. Mild PHTN  3. Mildly progressive BORJA and orthopnea  4. Single-vessel CAD s/p PCI of 80% prox LAD lesion 3/2019  5. Atypical chest discomfort    Plan:  - Stop lisinopril/hctz and start lisinopril and furosemide 20 mg daily to see if this improves her BORJA and orthopnea. May need to up-titrate furosemide however I want to be able to monitor her BP closely in this case. She agrees to obtain an [employer] home BP cuff now.   - If no symptomatic improvement with cautious diuresis, will consider stress echocardiogram to reassess aortic valve and CAD.   - We will touch base again in two weeks and the day prior to our visit she will have a BMP drawn at the [facility].   - Repeat TTE in 6 months, Dr. Dave thereafter.         History of Presenting Illness:      Heather Bauer is a pleasant 65 year old patient who presents today for a routine follow up video visit.    The patient has a history of the following -   She is a former smoker, having quit in 1999.  She has obesity, hypertension, aortic stenosis and coronary artery disease.     She met Dr. Dave in 02/2019 because of a chief complaint of dyspnea on exertion.  Echocardiogram demonstrated an ejection fraction of 60% with moderate to severe aortic stenosis, mean gradient of 33, valve area was calculated to be 0.9,  dimensionless index was 27, stroke volume was 39 mL/m.  A stress echocardiogram was arranged, which was positive for ischemia, leading to stenting of her left anterior descending artery in March.  She also was seen in the [facility] and it was felt her valve was more in the moderate range and that we would continue with medical management. We did reevaluate her aortic valve and echocardiogram in September, again demonstrated ejection fraction of 60%-65%, mean gradient across the valve of 34, valve area of 0.82 and it appeared to be unchanged from previous study.     Her most recent ECHO was performed 7/16/2020 and again, largely unchanged from her priors:  Moderate to severe valvular aortic stenosis, mean gradient 35 mmHg, YUKO 0.8 cm2. Very similar to most recent study.  There is mild (1+) tricuspid regurgitation.  Right ventricular systolic pressure is elevated, consistent with mild pulmonary hypertension.  The visual ejection fraction is estimated at 55-60%.    Today, Heather tells me that her BORJA has been stable, or perhaps mildly progressive, since her last visit. She is also experiencing mild orthopnea, which is new. She's had intermittent short episodes of chest discomfort associated with positional changes which sounds very atypical in nature. No near-syncope or syncope. Patient otherwise denies chest pain, PND, edema, claudication, palpitations. Denies weight gain - in fact, if anything, she's lost weight. She does not have a BP cuff at home.           Review of Systems:     12-pt ROS is negative except for as noted in the HPI.          Physical Exam:     Vitals: LMP 07/07/2008   Wt Readings from Last 10 Encounters:   01/16/20 108.9 kg (240 lb)   07/19/19 109.3 kg (241 lb)   06/04/19 109.6 kg (241 lb 9.6 oz)   04/23/19 109.9 kg (242 lb 3.2 oz)   03/21/19 108.9 kg (240 lb 1.6 oz)   03/11/19 109.8 kg (242 lb)   02/13/19 109.8 kg (242 lb)   02/01/19 109.8 kg (242 lb 1.6 oz)   04/10/18 107.4 kg (236 lb 12.8 oz)    11/30/17 108.6 kg (239 lb 8 oz)       Constitutional:  Patient is pleasant, alert, cooperative, and in NAD.  HEENT:  NCAT. PERRLA. EOM's intact.   Pulmonary: Normal respiratory effort.   Cardiac: CVP appears normal on limited video exam.  Extremities: No edema, erythema, cyanosis or tenderness appreciated.  Skin:  No rashes or lesions appreciated.   Neurological:  No gross motor or sensory deficits.   Psych: Appropriate affect.        Data:     Labs reviewed:  Recent Labs   Lab Test 01/16/20  1037 06/19/19  0823 02/01/19  1117   LDL 49 52 105*   HDL 84 63 71   NHDL 58 69 120   CHOL 142 132 191   TRIG 44 85 76       Lab Results   Component Value Date    WBC 6.0 03/11/2019    RBC 4.43 03/11/2019    HGB 14.4 03/11/2019    HCT 42.0 03/11/2019    MCV 95 03/11/2019    MCH 32.5 03/11/2019    MCHC 34.3 03/11/2019    RDW 12.4 03/11/2019     03/11/2019       Lab Results   Component Value Date     01/16/2020    POTASSIUM 4.2 01/16/2020    CHLORIDE 106 01/16/2020    CO2 29 01/16/2020    ANIONGAP 4 01/16/2020    GLC 98 01/16/2020    BUN 18 01/16/2020    CR 0.80 01/16/2020    GFRESTIMATED 78 01/16/2020    GFRESTBLACK >90 01/16/2020    RALPH 9.5 01/16/2020      Lab Results   Component Value Date    AST 18 03/11/2019    ALT 34 06/19/2019       Lab Results   Component Value Date    A1C 5.7 (H) 03/11/2019       Lab Results   Component Value Date    INR 0.96 03/11/2019           Problem List:     Patient Active Problem List   Diagnosis     Essential hypertension, benign     HCD (health care directive)     CARDIOVASCULAR SCREENING; LDL GOAL LESS THAN 130     Class 2 obesity due to excess calories without serious comorbidity with body mass index (BMI) of 36.0 to 36.9 in adult     BORJA (dyspnea on exertion)     Nonrheumatic aortic valve stenosis     Abnormal stress test     Coronary artery disease involving native coronary artery of native heart without angina pectoris     Hypercholesteremia           Medications:     Current  "Outpatient Medications   Medication Sig Dispense Refill     aspirin 81 MG EC tablet Take 81 mg by mouth daily       atorvastatin (LIPITOR) 40 MG tablet Take 1 tablet (40 mg) by mouth daily 90 tablet 3     clopidogrel (PLAVIX) 75 MG tablet Take 1 tablet (75 mg) by mouth daily 90 tablet 3     lisinopril-hydrochlorothiazide (PRINZIDE/ZESTORETIC) 20-12.5 MG tablet Take 1 tablet by mouth daily 90 tablet 3     metoprolol succinate ER (TOPROL XL) 25 MG 24 hr tablet Take 0.5 tablets (12.5 mg) by mouth daily 45 tablet 3     MULTIPLE MINERALS-VITAMINS OR TABS        nitroGLYcerin (NITROSTAT) 0.4 MG sublingual tablet One tablet under the tongue every 5 minutes if needed for chest pain. May repeat every 5 minutes for a maximum of 3 doses in 15 minutes\" (Patient not taking: Reported on 1/16/2020) 25 tablet 3           Past Medical History:     Past Medical History:   Diagnosis Date     Aortic valve stenosis      Essential hypertension, benign      Fracture, tibia, shaft      History of colposcopy with cervical biopsy 2/22/11    AMRITA I     Papanicolaou smear of vagina with atypical squamous cells cannot exclude high grade squamous intraepithelial lesion (ASC-H) 1/31/11     Past Surgical History:   Procedure Laterality Date     C APPENDECTOMY       CV CORONARY ANGIOGRAM N/A 3/11/2019    Procedure: Coronary Angiogram;  Surgeon: Adolph Dave MD;  Location: [facility] CARDIAC CATH LAB     CV PCI ANGIOPLASTY N/A 3/11/2019    Procedure: Percutaneous Coronary Intervention;  Surgeon: Adolph Dave MD;  Location: [facility] CARDIAC CATH LAB     Family History   Problem Relation Age of Onset     C.A.D. Mother         early 60's     Lipids Mother      Heart Disease Mother 68        heart bypass surgery     Hypertension Mother      Thyroid Disease Mother      Heart Disease Father         valvular disease     Thyroid Disease Paternal Aunt      Heart Disease Maternal Grandmother         heart attack     Social History "     Socioeconomic History     Marital status:      Spouse name: Not on file     Number of children: 2     Years of education: Not on file     Highest education level: Not on file   Occupational History     Employer: [employer]   Social Needs     Financial resource strain: Not on file     Food insecurity     Worry: Not on file     Inability: Not on file     Transportation needs     Medical: Not on file     Non-medical: Not on file   Tobacco Use     Smoking status: Former Smoker     Smokeless tobacco: Never Used     Tobacco comment: quit 20 years ago   Substance and Sexual Activity     Alcohol use: Yes     Comment: wine every week     Drug use: No     Sexual activity: Yes     Birth control/protection: Condom   Lifestyle     Physical activity     Days per week: Not on file     Minutes per session: Not on file     Stress: Not on file   Relationships     Social connections     Talks on phone: Not on file     Gets together: Not on file     Attends Jain service: Not on file     Active member of club or organization: Not on file     Attends meetings of clubs or organizations: Not on file     Relationship status: Not on file     Intimate partner violence     Fear of current or ex partner: Not on file     Emotionally abused: Not on file     Physically abused: Not on file     Forced sexual activity: Not on file   Other Topics Concern      Service Not Asked     Blood Transfusions Not Asked     Caffeine Concern Not Asked     Occupational Exposure Not Asked     Hobby Hazards Not Asked     Sleep Concern Not Asked     Stress Concern Not Asked     Weight Concern Not Asked     Special Diet Not Asked     Back Care Not Asked     Exercise Yes     Bike Helmet Not Asked     Seat Belt Yes     Self-Exams Not Asked     Parent/sibling w/ CABG, MI or angioplasty before 65F 55M? Not Asked   Social History Narrative     Not on file           Allergies:   No known drug allergies      [facility]RE[facility]  Heart Clinic  Pager: [phone]    Thank you for allowing me to participate in the care of your patient.    Sincerely,     Smitha Solis PA-C     [facility]

## 2020-07-24 ENCOUNTER — TELEPHONE (OUTPATIENT)
Dept: CARDIOLOGY | Facility: CLINIC | Age: 65
End: 2020-07-24

## 2020-07-24 DIAGNOSIS — I25.10 CORONARY ARTERY DISEASE INVOLVING NATIVE CORONARY ARTERY OF NATIVE HEART WITHOUT ANGINA PECTORIS: Primary | ICD-10-CM

## 2020-07-24 NOTE — TELEPHONE ENCOUNTER
Patient called and left  inquiring about f/u. RN placed orders for BMP and f/u with ELIZABETH Smitha Solis and messaged scheduling to arrange.            7/20/20 virtual visit ELIZABETH Smitha Solis  Plan:  - Stop lisinopril/hctz and start lisinopril and furosemide 20 mg daily to see if this improves her BORJA and orthopnea. If no improvement, will plan to increase furosemide accordingly. She will also obtain an omron home BP cuff in order to monitor for hypotension. We will touch base again in two weeks and the day prior to our visit she will have a BMP drawn at the [facility].   - Repeat TTE in 6 months, Dr. Dave thereafter.

## 2020-08-07 ENCOUNTER — TELEPHONE (OUTPATIENT)
Dept: CARDIOLOGY | Facility: CLINIC | Age: 65
End: 2020-08-07

## 2020-08-07 NOTE — TELEPHONE ENCOUNTER

## 2020-08-10 DIAGNOSIS — I25.10 CORONARY ARTERY DISEASE INVOLVING NATIVE CORONARY ARTERY OF NATIVE HEART WITHOUT ANGINA PECTORIS: ICD-10-CM

## 2020-08-10 LAB
ANION GAP SERPL CALCULATED.3IONS-SCNC: 3 MMOL/L (ref 3–14)
BUN SERPL-MCNC: 17 MG/DL (ref 7–30)
CALCIUM SERPL-MCNC: 9.1 MG/DL (ref 8.5–10.1)
CHLORIDE SERPL-SCNC: 104 MMOL/L (ref 94–109)
CO2 SERPL-SCNC: 29 MMOL/L (ref 20–32)
CREAT SERPL-MCNC: 0.87 MG/DL (ref 0.52–1.04)
GFR SERPL CREATININE-BSD FRML MDRD: 71 ML/MIN/{1.73_M2}
GLUCOSE SERPL-MCNC: 100 MG/DL (ref 70–99)
POTASSIUM SERPL-SCNC: 3.9 MMOL/L (ref 3.4–5.3)
SODIUM SERPL-SCNC: 136 MMOL/L (ref 133–144)

## 2020-08-10 PROCEDURE — 80048 BASIC METABOLIC PNL TOTAL CA: CPT | Performed by: INTERNAL MEDICINE

## 2020-08-10 PROCEDURE — 36415 COLL VENOUS BLD VENIPUNCTURE: CPT | Performed by: INTERNAL MEDICINE

## 2020-08-12 ENCOUNTER — VIRTUAL VISIT (OUTPATIENT)
Dept: CARDIOLOGY | Facility: CLINIC | Age: 65
End: 2020-08-12
Attending: PHYSICIAN ASSISTANT
Payer: COMMERCIAL

## 2020-08-12 VITALS
WEIGHT: 230 LBS | BODY MASS INDEX: 34.98 KG/M2 | DIASTOLIC BLOOD PRESSURE: 65 MMHG | SYSTOLIC BLOOD PRESSURE: 96 MMHG | HEART RATE: 83 BPM

## 2020-08-12 DIAGNOSIS — I25.10 CORONARY ARTERY DISEASE INVOLVING NATIVE CORONARY ARTERY OF NATIVE HEART WITHOUT ANGINA PECTORIS: Primary | ICD-10-CM

## 2020-08-12 DIAGNOSIS — I35.0 NONRHEUMATIC AORTIC VALVE STENOSIS: ICD-10-CM

## 2020-08-12 PROCEDURE — 99214 OFFICE O/P EST MOD 30 MIN: CPT | Mod: 95 | Performed by: PHYSICIAN ASSISTANT

## 2020-08-12 RX ORDER — LISINOPRIL 10 MG/1
10 TABLET ORAL DAILY
Qty: 30 TABLET | Refills: 1 | Status: SHIPPED | OUTPATIENT
Start: 2020-08-12 | End: 2020-08-27

## 2020-08-12 RX ORDER — POTASSIUM CHLORIDE 1500 MG/1
20 TABLET, EXTENDED RELEASE ORAL DAILY
Qty: 30 TABLET | Refills: 1 | Status: SHIPPED | OUTPATIENT
Start: 2020-08-12 | End: 2020-08-27

## 2020-08-12 RX ORDER — FUROSEMIDE 40 MG
40 TABLET ORAL DAILY
Qty: 30 TABLET | Refills: 1 | Status: SHIPPED | OUTPATIENT
Start: 2020-08-12 | End: 2020-08-27

## 2020-08-12 NOTE — PROGRESS NOTES
"Heather Bauer is a 65 year old female who is being evaluated via a billable video visit.      The patient has been notified of following:     \"This video visit will be conducted via a call between you and your physician/provider. We have found that certain health care needs can be provided without the need for an in-person physical exam.  This service lets us provide the care you need with a video conversation.  If a prescription is necessary we can send it directly to your pharmacy.  If lab work is needed we can place an order for that and you can then stop by our lab to have the test done at a later time.    Video visits are billed at different rates depending on your insurance coverage.  Please reach out to your insurance provider with any questions.    If during the course of the call the physician/provider feels a video visit is not appropriate, you will not be charged for this service.\"    Patient has given verbal consent for Video visit? Yes  How would you like to obtain your AVS? MyChart  If you are dropped from the video visit, the video invite should be resent to: Send to e-mail at: [email or web address]  Will anyone else be joining your video visit? No  {If patient encounters technical issues they should call [phone] :[id number]}   Review Of Systems  Skin: NEGATIVE  Eyes:Ears/Nose/Throat: NEGATIVE  Respiratory: some BORJA, nothing more than normal  Cardiovascular: no issues. Still some edema but under control  Gastrointestinal: NEGATIVE  Genitourinary:NEGATIVE   Musculoskeletal: NEGATIVE  Neurologic: NEGATIVE  Psychiatric: NEGATIVE  Hematologic/Lymphatic/Immunologic: NEGATIVE  Endocrine:  NEGATIVE  Vitals:   BP 96/65  Pulse 83  Weight 230lbs, has been consistent    Telephone number of patient: [phone]      Video-Visit Details    Type of service:  Video Visit    Video Start Time: {video visit start/end time:152948}  Video End Time: {video visit start/end time:152948}    Originating Location (pt. " Location): Home    Distant Location (provider location):  [facility]     Platform used for Video Visit: Richardson Nolasco LPN      ***      Cardiology Clinic Progress Note    Heather Bauer MRN# [id number]   YOB: 1955 Age: 65 year old   Primary cardiologist: Dr. Dave         Assessment and Plan:     In summary, Heather Bauer presents today for a 6 month follow up video visit.    1. Moderate-severe aortic stenosis, stable on recent TTE  2. Mild PHTN  3. Mildly progressive BORJA and orthopnea  4. Single-vessel CAD s/p PCI of 80% prox LAD lesion 3/2019  5. Atypical chest discomfort    Plan:  - Stop lisinopril/hctz and start lisinopril and furosemide 20 mg daily to see if this improves her BORAJ and orthopnea. May need to up-titrate furosemide however I want to be able to monitor her BP closely in this case. She agrees to obtain an [employer] home BP cuff now.   - If no symptomatic improvement with cautious diuresis, will consider stress echocardiogram to reassess aortic valve and CAD.   - We will touch base again in two weeks and the day prior to our visit she will have a BMP drawn at the [facility].   - Repeat TTE in 6 months, Dr. Dave thereafter.         History of Presenting Illness:      Heather Bauer is a pleasant 65 year old patient who presents today for a routine follow up video visit.    The patient has a history of the following -   She is a former smoker, having quit in 1999.  She has obesity, hypertension, aortic stenosis and coronary artery disease.     She met Dr. Dave in 02/2019 because of a chief complaint of dyspnea on exertion.  Echocardiogram demonstrated an ejection fraction of 60% with moderate to severe aortic stenosis, mean gradient of 33, valve area was calculated to be 0.9, dimensionless index was 27, stroke volume was 39 mL/m.  A stress echocardiogram was arranged, which was positive for ischemia, leading to  stenting of her left anterior descending artery in March.  She also was seen in the [facility] and it was felt her valve was more in the moderate range and that we would continue with medical management. We did reevaluate her aortic valve and echocardiogram in September, again demonstrated ejection fraction of 60%-65%, mean gradient across the valve of 34, valve area of 0.82 and it appeared to be unchanged from previous study.     Her most recent ECHO was performed 7/16/2020 and again, largely unchanged from her priors:  Moderate to severe valvular aortic stenosis, mean gradient 35 mmHg, YUKO 0.8 cm2. Very similar to most recent study.  There is mild (1+) tricuspid regurgitation.  Right ventricular systolic pressure is elevated, consistent with mild pulmonary hypertension.  The visual ejection fraction is estimated at 55-60%.    I met Heather 7/20/2020 for a routine six month follow up visit. She described mildly progressive dyspnea and orthopnea, as well as atypical occasional chest discomfort. I stopped her hydrochlorothiazide and started furosemide 20 mg daily.     Today, ***. BMP earlier this week was stable.          Review of Systems:     12-pt ROS is negative except for as noted in the HPI.          Physical Exam:     Vitals: LMP 07/07/2008   Wt Readings from Last 10 Encounters:   07/20/20 105.2 kg (232 lb)   01/16/20 108.9 kg (240 lb)   07/19/19 109.3 kg (241 lb)   06/04/19 109.6 kg (241 lb 9.6 oz)   04/23/19 109.9 kg (242 lb 3.2 oz)   03/21/19 108.9 kg (240 lb 1.6 oz)   03/11/19 109.8 kg (242 lb)   02/13/19 109.8 kg (242 lb)   02/01/19 109.8 kg (242 lb 1.6 oz)   04/10/18 107.4 kg (236 lb 12.8 oz)       Constitutional:  Patient is pleasant, alert, cooperative, and in NAD.  HEENT:  NCAT. PERRLA. EOM's intact.   Pulmonary: Normal respiratory effort.   Cardiac: CVP appears normal on limited video exam.  Extremities: No edema, erythema, cyanosis or tenderness appreciated.  Skin:  No rashes or lesions appreciated.    Neurological:  No gross motor or sensory deficits.   Psych: Appropriate affect.        Data:     Labs reviewed:  Recent Labs   Lab Test 01/16/20  1037 06/19/19  0823 02/01/19  1117   LDL 49 52 105*   HDL 84 63 71   NHDL 58 69 120   CHOL 142 132 191   TRIG 44 85 76       Lab Results   Component Value Date    WBC 6.0 03/11/2019    RBC 4.43 03/11/2019    HGB 14.4 03/11/2019    HCT 42.0 03/11/2019    MCV 95 03/11/2019    MCH 32.5 03/11/2019    MCHC 34.3 03/11/2019    RDW 12.4 03/11/2019     03/11/2019       Lab Results   Component Value Date     08/10/2020    POTASSIUM 3.9 08/10/2020    CHLORIDE 104 08/10/2020    CO2 29 08/10/2020    ANIONGAP 3 08/10/2020     (H) 08/10/2020    BUN 17 08/10/2020    CR 0.87 08/10/2020    GFRESTIMATED 71 08/10/2020    GFRESTBLACK 82 08/10/2020    RALPH 9.1 08/10/2020      Lab Results   Component Value Date    AST 18 03/11/2019    ALT 34 06/19/2019       Lab Results   Component Value Date    A1C 5.7 (H) 03/11/2019       Lab Results   Component Value Date    INR 0.96 03/11/2019           Problem List:     Patient Active Problem List   Diagnosis     Essential hypertension, benign     HCD (health care directive)     CARDIOVASCULAR SCREENING; LDL GOAL LESS THAN 130     Class 2 obesity due to excess calories without serious comorbidity with body mass index (BMI) of 36.0 to 36.9 in adult     BORJA (dyspnea on exertion)     Nonrheumatic aortic valve stenosis     Abnormal stress test     Coronary artery disease involving native coronary artery of native heart without angina pectoris     Hypercholesteremia           Medications:     Current Outpatient Medications   Medication Sig Dispense Refill     aspirin 81 MG EC tablet Take 81 mg by mouth daily       atorvastatin (LIPITOR) 40 MG tablet Take 1 tablet (40 mg) by mouth daily 90 tablet 3     clopidogrel (PLAVIX) 75 MG tablet Take 1 tablet (75 mg) by mouth daily (Patient not taking: Reported on 7/20/2020) 90 tablet 3     furosemide  "(LASIX) 20 MG tablet Take 1 tablet (20 mg) by mouth daily 90 tablet 0     lisinopril (ZESTRIL) 20 MG tablet Take 1 tablet (20 mg) by mouth daily 90 tablet 0     metoprolol succinate ER (TOPROL XL) 25 MG 24 hr tablet Take 0.5 tablets (12.5 mg) by mouth daily 45 tablet 3     MULTIPLE MINERALS-VITAMINS OR TABS        nitroGLYcerin (NITROSTAT) 0.4 MG sublingual tablet One tablet under the tongue every 5 minutes if needed for chest pain. May repeat every 5 minutes for a maximum of 3 doses in 15 minutes\" 25 tablet 3           Past Medical History:     Past Medical History:   Diagnosis Date     Aortic valve stenosis      Essential hypertension, benign      Fracture, tibia, shaft      History of colposcopy with cervical biopsy 2/22/11    AMRITA I     Papanicolaou smear of vagina with atypical squamous cells cannot exclude high grade squamous intraepithelial lesion (ASC-H) 1/31/11     Past Surgical History:   Procedure Laterality Date     C APPENDECTOMY       CV CORONARY ANGIOGRAM N/A 3/11/2019    Procedure: Coronary Angiogram;  Surgeon: Adolph Dave MD;  Location: [facility] CARDIAC CATH LAB     CV PCI ANGIOPLASTY N/A 3/11/2019    Procedure: Percutaneous Coronary Intervention;  Surgeon: Adolph Dave MD;  Location: [facility] CARDIAC CATH LAB     Family History   Problem Relation Age of Onset     C.A.D. Mother         early 60's     Lipids Mother      Heart Disease Mother 68        heart bypass surgery     Hypertension Mother      Thyroid Disease Mother      Heart Disease Father         valvular disease     Thyroid Disease Paternal Aunt      Heart Disease Maternal Grandmother         heart attack     Social History     Socioeconomic History     Marital status:      Spouse name: Not on file     Number of children: 2     Years of education: Not on file     Highest education level: Not on file   Occupational History     Employer: [employer]   Social Needs     Financial resource strain: Not on file     Food " insecurity     Worry: Not on file     Inability: Not on file     Transportation needs     Medical: Not on file     Non-medical: Not on file   Tobacco Use     Smoking status: Former Smoker     Smokeless tobacco: Never Used     Tobacco comment: quit 20 years ago   Substance and Sexual Activity     Alcohol use: Yes     Comment: wine every week     Drug use: No     Sexual activity: Yes     Birth control/protection: Condom   Lifestyle     Physical activity     Days per week: Not on file     Minutes per session: Not on file     Stress: Not on file   Relationships     Social connections     Talks on phone: Not on file     Gets together: Not on file     Attends Tenriism service: Not on file     Active member of club or organization: Not on file     Attends meetings of clubs or organizations: Not on file     Relationship status: Not on file     Intimate partner violence     Fear of current or ex partner: Not on file     Emotionally abused: Not on file     Physically abused: Not on file     Forced sexual activity: Not on file   Other Topics Concern      Service Not Asked     Blood Transfusions Not Asked     Caffeine Concern Not Asked     Occupational Exposure Not Asked     Hobby Hazards Not Asked     Sleep Concern Not Asked     Stress Concern Not Asked     Weight Concern Not Asked     Special Diet Not Asked     Back Care Not Asked     Exercise Yes     Bike Helmet Not Asked     Seat Belt Yes     Self-Exams Not Asked     Parent/sibling w/ CABG, MI or angioplasty before 65F 55M? Not Asked   Social History Narrative     Not on file           Allergies:   No known drug allergies      Smitha Solis PA-C  [facility]  Pager: [phone]

## 2020-08-12 NOTE — LETTER
8/12/2020    Sylvia Bird MD  [address]  [facility] [address]    RE: Heather Bauer       Dear Colleague,    I had the pleasure of seeing Heather Bauer in the [facility].    Heather Bauer is a 65 year old female who is being evaluated via a billable video visit.      Cardiology Clinic Progress Note    Heather Bauer MRN# [id number]   YOB: 1955 Age: 65 year old   Primary cardiologist: Dr. Dave         Assessment and Plan:     In summary, Heather Bauer presents today for a 6 month follow up video visit.    1. Moderate-severe aortic stenosis, stable on recent TTE  2. Mild PHTN  3. Mildly progressive BORJA and orthopnea  4. Single-vessel CAD s/p PCI of 80% prox LAD lesion 3/2019  5. Atypical chest discomfort    Plan:  - Reduce lisinopril from 20 to 10 mg daily, increase furosemide from 20 to 40 mg daily.   - She will monitor for worsening hypotension and call me in this case.   - If no symptomatic improvement with cautious diuresis, will consider R/LHC. Reviewed with Dr. Dave.  - If she improves, plan to repeat TTE in 6 months, Dr. Dave thereafter.     Follow-up:  In clinic with Blanca Ash on 8/27, with labs day prior.         History of Presenting Illness:      Heather Bauer is a pleasant 65 year old patient who presents today for follow up video visit regarding mildly progressive acute on chronic BORJA.    The patient has a history of the following -   # Moderate-severe aortic stenosis  # CAD, s/p PCI to LAD in 3/2019  # Former smoker, having quit in 1999.    # Obesity  # HTN    She met Dr. Dave in 02/2019 because of a chief complaint of dyspnea on exertion.  Echocardiogram demonstrated an ejection fraction of 60% with moderate to severe aortic stenosis, mean gradient of 33, valve area was calculated to be 0.9, dimensionless index was 27, stroke volume was 39 mL/m.  A stress echocardiogram was arranged, which was positive  for ischemia, leading to stenting of her left anterior descending artery in March.  She also was seen in the [facility] and it was felt her valve was more in the moderate range and that we would continue with medical management. We did reevaluate her aortic valve and echocardiogram in September, again demonstrated ejection fraction of 60%-65%, mean gradient across the valve of 34, valve area of 0.82 and it appeared to be unchanged from previous study.     Her most recent ECHO was performed 7/16/2020 and again, largely unchanged from her priors:  Moderate to severe valvular aortic stenosis, mean gradient 35 mmHg, YUKO 0.8 cm2. Very similar to most recent study.  There is mild (1+) tricuspid regurgitation.  Right ventricular systolic pressure is elevated, consistent with mild pulmonary hypertension.  The visual ejection fraction is estimated at 55-60%.    I met Heather 7/20/2020 for a routine six month follow up visit. She described mildly progressive dyspnea and orthopnea, as well as atypical occasional chest discomfort. I stopped her hydrochlorothiazide and started furosemide 20 mg daily. I started at low dose as Heather did not at that time have a BP cuff to monitor at home and I wanted to ensure she was not hypotensive before initiating higher dose.     Today, Heather feels about the same. BMP earlier this week was stable. Weight is down 2 lbs. BP is marginal. She denies symptoms associated with this.          Review of Systems:     12-pt ROS is negative except for as noted in the HPI.          Physical Exam:     Vitals: LMP 07/07/2008   Wt Readings from Last 10 Encounters:   07/20/20 105.2 kg (232 lb)   01/16/20 108.9 kg (240 lb)   07/19/19 109.3 kg (241 lb)   06/04/19 109.6 kg (241 lb 9.6 oz)   04/23/19 109.9 kg (242 lb 3.2 oz)   03/21/19 108.9 kg (240 lb 1.6 oz)   03/11/19 109.8 kg (242 lb)   02/13/19 109.8 kg (242 lb)   02/01/19 109.8 kg (242 lb 1.6 oz)   04/10/18 107.4 kg (236 lb 12.8 oz)       Constitutional:   Patient is pleasant, alert, cooperative, and in NAD.  HEENT:  NCAT. PERRLA. EOM's intact.   Pulmonary: Normal respiratory effort.   Cardiac: CVP appears normal on limited video exam.  Extremities: No edema, erythema, cyanosis or tenderness appreciated.  Skin:  No rashes or lesions appreciated.   Neurological:  No gross motor or sensory deficits.   Psych: Appropriate affect.        Data:     Labs reviewed:  Recent Labs   Lab Test 01/16/20  1037 06/19/19  0823 02/01/19  1117   LDL 49 52 105*   HDL 84 63 71   NHDL 58 69 120   CHOL 142 132 191   TRIG 44 85 76       Lab Results   Component Value Date    WBC 6.0 03/11/2019    RBC 4.43 03/11/2019    HGB 14.4 03/11/2019    HCT 42.0 03/11/2019    MCV 95 03/11/2019    MCH 32.5 03/11/2019    MCHC 34.3 03/11/2019    RDW 12.4 03/11/2019     03/11/2019       Lab Results   Component Value Date     08/10/2020    POTASSIUM 3.9 08/10/2020    CHLORIDE 104 08/10/2020    CO2 29 08/10/2020    ANIONGAP 3 08/10/2020     (H) 08/10/2020    BUN 17 08/10/2020    CR 0.87 08/10/2020    GFRESTIMATED 71 08/10/2020    GFRESTBLACK 82 08/10/2020    RALPH 9.1 08/10/2020      Lab Results   Component Value Date    AST 18 03/11/2019    ALT 34 06/19/2019       Lab Results   Component Value Date    A1C 5.7 (H) 03/11/2019       Lab Results   Component Value Date    INR 0.96 03/11/2019           Problem List:     Patient Active Problem List   Diagnosis     Essential hypertension, benign     HCD (health care directive)     CARDIOVASCULAR SCREENING; LDL GOAL LESS THAN 130     Class 2 obesity due to excess calories without serious comorbidity with body mass index (BMI) of 36.0 to 36.9 in adult     BORJA (dyspnea on exertion)     Nonrheumatic aortic valve stenosis     Abnormal stress test     Coronary artery disease involving native coronary artery of native heart without angina pectoris     Hypercholesteremia           Medications:     Current Outpatient Medications   Medication Sig Dispense  "Refill     aspirin 81 MG EC tablet Take 81 mg by mouth daily       atorvastatin (LIPITOR) 40 MG tablet Take 1 tablet (40 mg) by mouth daily 90 tablet 3     clopidogrel (PLAVIX) 75 MG tablet Take 1 tablet (75 mg) by mouth daily (Patient not taking: Reported on 7/20/2020) 90 tablet 3     furosemide (LASIX) 20 MG tablet Take 1 tablet (20 mg) by mouth daily 90 tablet 0     lisinopril (ZESTRIL) 20 MG tablet Take 1 tablet (20 mg) by mouth daily 90 tablet 0     metoprolol succinate ER (TOPROL XL) 25 MG 24 hr tablet Take 0.5 tablets (12.5 mg) by mouth daily 45 tablet 3     MULTIPLE MINERALS-VITAMINS OR TABS        nitroGLYcerin (NITROSTAT) 0.4 MG sublingual tablet One tablet under the tongue every 5 minutes if needed for chest pain. May repeat every 5 minutes for a maximum of 3 doses in 15 minutes\" 25 tablet 3           Past Medical History:     Past Medical History:   Diagnosis Date     Aortic valve stenosis      Essential hypertension, benign      Fracture, tibia, shaft      History of colposcopy with cervical biopsy 2/22/11    AMRITA I     Papanicolaou smear of vagina with atypical squamous cells cannot exclude high grade squamous intraepithelial lesion (ASC-H) 1/31/11     Past Surgical History:   Procedure Laterality Date     C APPENDECTOMY       CV CORONARY ANGIOGRAM N/A 3/11/2019    Procedure: Coronary Angiogram;  Surgeon: Adolph Dave MD;  Location: [facility] CARDIAC CATH LAB     CV PCI ANGIOPLASTY N/A 3/11/2019    Procedure: Percutaneous Coronary Intervention;  Surgeon: Adolph Dave MD;  Location: [facility] CARDIAC CATH LAB     Family History   Problem Relation Age of Onset     C.A.D. Mother         early 60's     Lipids Mother      Heart Disease Mother 68        heart bypass surgery     Hypertension Mother      Thyroid Disease Mother      Heart Disease Father         valvular disease     Thyroid Disease Paternal Aunt      Heart Disease Maternal Grandmother         heart attack     Social History "     Socioeconomic History     Marital status:      Spouse name: Not on file     Number of children: 2     Years of education: Not on file     Highest education level: Not on file   Occupational History     Employer: [employer]   Social Needs     Financial resource strain: Not on file     Food insecurity     Worry: Not on file     Inability: Not on file     Transportation needs     Medical: Not on file     Non-medical: Not on file   Tobacco Use     Smoking status: Former Smoker     Smokeless tobacco: Never Used     Tobacco comment: quit 20 years ago   Substance and Sexual Activity     Alcohol use: Yes     Comment: wine every week     Drug use: No     Sexual activity: Yes     Birth control/protection: Condom   Lifestyle     Physical activity     Days per week: Not on file     Minutes per session: Not on file     Stress: Not on file   Relationships     Social connections     Talks on phone: Not on file     Gets together: Not on file     Attends Denominational service: Not on file     Active member of club or organization: Not on file     Attends meetings of clubs or organizations: Not on file     Relationship status: Not on file     Intimate partner violence     Fear of current or ex partner: Not on file     Emotionally abused: Not on file     Physically abused: Not on file     Forced sexual activity: Not on file   Other Topics Concern      Service Not Asked     Blood Transfusions Not Asked     Caffeine Concern Not Asked     Occupational Exposure Not Asked     Hobby Hazards Not Asked     Sleep Concern Not Asked     Stress Concern Not Asked     Weight Concern Not Asked     Special Diet Not Asked     Back Care Not Asked     Exercise Yes     Bike Helmet Not Asked     Seat Belt Yes     Self-Exams Not Asked     Parent/sibling w/ CABG, MI or angioplasty before 65F 55M? Not Asked   Social History Narrative     Not on file           Allergies:   No known drug allergies      [facility]  Pager: [phone]    Thank you for allowing me to participate in the care of your patient.    Sincerely,     Smitha Solis PA-C     [facility]

## 2020-08-12 NOTE — PROGRESS NOTES
"Heather Bauer is a 65 year old female who is being evaluated via a billable video visit.      The patient has been notified of following:     \"This video visit will be conducted via a call between you and your physician/provider. We have found that certain health care needs can be provided without the need for an in-person physical exam.  This service lets us provide the care you need with a video conversation.  If a prescription is necessary we can send it directly to your pharmacy.  If lab work is needed we can place an order for that and you can then stop by our lab to have the test done at a later time.    Video visits are billed at different rates depending on your insurance coverage.  Please reach out to your insurance provider with any questions.    If during the course of the call the physician/provider feels a video visit is not appropriate, you will not be charged for this service.\"    Patient has given verbal consent for Video visit? Yes  How would you like to obtain your AVS? MyChart  If you are dropped from the video visit, the video invite should be resent to: Send to e-mail at: [email or web address]  Will anyone else be joining your video visit? No     Review Of Systems  Skin: NEGATIVE  Eyes:Ears/Nose/Throat: NEGATIVE  Respiratory: some BORJA, nothing more than normal  Cardiovascular: no issues. Still some edema but under control  Gastrointestinal: NEGATIVE  Genitourinary:NEGATIVE   Musculoskeletal: NEGATIVE  Neurologic: NEGATIVE  Psychiatric: NEGATIVE  Hematologic/Lymphatic/Immunologic: NEGATIVE  Endocrine:  NEGATIVE  Vitals:   BP 96/65  Pulse 83  Weight 230lbs, has been consistent    Telephone number of patient: [phone]      Video-Visit Details    Type of service:  Video Visit    Video Start Time: 3:14 PM  Video End Time: 3:43 PM    Originating Location (pt. Location): Home    Distant Location (provider location):  [facility]     Platform used for Video " Visit: Richardson Nolasco LPN      Cardiology Clinic Progress Note    Heather Bauer MRN# [id number]   YOB: 1955 Age: 65 year old   Primary cardiologist: Dr. Dave         Assessment and Plan:     In summary, Heather Bauer presents today for a 6 month follow up video visit.    1. Moderate-severe aortic stenosis, stable on recent TTE  2. Mild PHTN  3. Mildly progressive BORJA and orthopnea  4. Single-vessel CAD s/p PCI of 80% prox LAD lesion 3/2019  5. Atypical chest discomfort    Plan:  - Reduce lisinopril from 20 to 10 mg daily, increase furosemide from 20 to 40 mg daily.   - She will monitor for worsening hypotension and call me in this case.   - If no symptomatic improvement with cautious diuresis, will consider R/LHC. Reviewed with Dr. Dave.  - If she improves, plan to repeat TTE in 6 months, Dr. Dave thereafter.     Follow-up:  In clinic with Blanca Ash on 8/27, with labs day prior.         History of Presenting Illness:      Heather Bauer is a pleasant 65 year old patient who presents today for follow up video visit regarding mildly progressive acute on chronic BORJA.    The patient has a history of the following -   # Moderate-severe aortic stenosis  # CAD, s/p PCI to LAD in 3/2019  # Former smoker, having quit in 1999.    # Obesity  # HTN    She met Dr. Dave in 02/2019 because of a chief complaint of dyspnea on exertion.  Echocardiogram demonstrated an ejection fraction of 60% with moderate to severe aortic stenosis, mean gradient of 33, valve area was calculated to be 0.9, dimensionless index was 27, stroke volume was 39 mL/m.  A stress echocardiogram was arranged, which was positive for ischemia, leading to stenting of her left anterior descending artery in March.  She also was seen in the [facility] and it was felt her valve was more in the moderate range and that we would continue with medical management. We did reevaluate her aortic valve and  echocardiogram in September, again demonstrated ejection fraction of 60%-65%, mean gradient across the valve of 34, valve area of 0.82 and it appeared to be unchanged from previous study.     Her most recent ECHO was performed 7/16/2020 and again, largely unchanged from her priors:  Moderate to severe valvular aortic stenosis, mean gradient 35 mmHg, YUKO 0.8 cm2. Very similar to most recent study.  There is mild (1+) tricuspid regurgitation.  Right ventricular systolic pressure is elevated, consistent with mild pulmonary hypertension.  The visual ejection fraction is estimated at 55-60%.    I met Heather 7/20/2020 for a routine six month follow up visit. She described mildly progressive dyspnea and orthopnea, as well as atypical occasional chest discomfort. I stopped her hydrochlorothiazide and started furosemide 20 mg daily. I started at low dose as Heather did not at that time have a BP cuff to monitor at home and I wanted to ensure she was not hypotensive before initiating higher dose.     Today, Heather feels about the same. BMP earlier this week was stable. Weight is down 2 lbs. BP is marginal. She denies symptoms associated with this.          Review of Systems:     12-pt ROS is negative except for as noted in the HPI.          Physical Exam:     Vitals: LMP 07/07/2008   Wt Readings from Last 10 Encounters:   07/20/20 105.2 kg (232 lb)   01/16/20 108.9 kg (240 lb)   07/19/19 109.3 kg (241 lb)   06/04/19 109.6 kg (241 lb 9.6 oz)   04/23/19 109.9 kg (242 lb 3.2 oz)   03/21/19 108.9 kg (240 lb 1.6 oz)   03/11/19 109.8 kg (242 lb)   02/13/19 109.8 kg (242 lb)   02/01/19 109.8 kg (242 lb 1.6 oz)   04/10/18 107.4 kg (236 lb 12.8 oz)       Constitutional:  Patient is pleasant, alert, cooperative, and in NAD.  HEENT:  NCAT. PERRLA. EOM's intact.   Pulmonary: Normal respiratory effort.   Cardiac: CVP appears normal on limited video exam.  Extremities: No edema, erythema, cyanosis or tenderness appreciated.  Skin:  No  rashes or lesions appreciated.   Neurological:  No gross motor or sensory deficits.   Psych: Appropriate affect.        Data:     Labs reviewed:  Recent Labs   Lab Test 01/16/20  1037 06/19/19  0823 02/01/19  1117   LDL 49 52 105*   HDL 84 63 71   NHDL 58 69 120   CHOL 142 132 191   TRIG 44 85 76       Lab Results   Component Value Date    WBC 6.0 03/11/2019    RBC 4.43 03/11/2019    HGB 14.4 03/11/2019    HCT 42.0 03/11/2019    MCV 95 03/11/2019    MCH 32.5 03/11/2019    MCHC 34.3 03/11/2019    RDW 12.4 03/11/2019     03/11/2019       Lab Results   Component Value Date     08/10/2020    POTASSIUM 3.9 08/10/2020    CHLORIDE 104 08/10/2020    CO2 29 08/10/2020    ANIONGAP 3 08/10/2020     (H) 08/10/2020    BUN 17 08/10/2020    CR 0.87 08/10/2020    GFRESTIMATED 71 08/10/2020    GFRESTBLACK 82 08/10/2020    RALPH 9.1 08/10/2020      Lab Results   Component Value Date    AST 18 03/11/2019    ALT 34 06/19/2019       Lab Results   Component Value Date    A1C 5.7 (H) 03/11/2019       Lab Results   Component Value Date    INR 0.96 03/11/2019           Problem List:     Patient Active Problem List   Diagnosis     Essential hypertension, benign     HCD (health care directive)     CARDIOVASCULAR SCREENING; LDL GOAL LESS THAN 130     Class 2 obesity due to excess calories without serious comorbidity with body mass index (BMI) of 36.0 to 36.9 in adult     BORJA (dyspnea on exertion)     Nonrheumatic aortic valve stenosis     Abnormal stress test     Coronary artery disease involving native coronary artery of native heart without angina pectoris     Hypercholesteremia           Medications:     Current Outpatient Medications   Medication Sig Dispense Refill     aspirin 81 MG EC tablet Take 81 mg by mouth daily       atorvastatin (LIPITOR) 40 MG tablet Take 1 tablet (40 mg) by mouth daily 90 tablet 3     clopidogrel (PLAVIX) 75 MG tablet Take 1 tablet (75 mg) by mouth daily (Patient not taking: Reported on  "7/20/2020) 90 tablet 3     furosemide (LASIX) 20 MG tablet Take 1 tablet (20 mg) by mouth daily 90 tablet 0     lisinopril (ZESTRIL) 20 MG tablet Take 1 tablet (20 mg) by mouth daily 90 tablet 0     metoprolol succinate ER (TOPROL XL) 25 MG 24 hr tablet Take 0.5 tablets (12.5 mg) by mouth daily 45 tablet 3     MULTIPLE MINERALS-VITAMINS OR TABS        nitroGLYcerin (NITROSTAT) 0.4 MG sublingual tablet One tablet under the tongue every 5 minutes if needed for chest pain. May repeat every 5 minutes for a maximum of 3 doses in 15 minutes\" 25 tablet 3           Past Medical History:     Past Medical History:   Diagnosis Date     Aortic valve stenosis      Essential hypertension, benign      Fracture, tibia, shaft      History of colposcopy with cervical biopsy 2/22/11    AMRITA I     Papanicolaou smear of vagina with atypical squamous cells cannot exclude high grade squamous intraepithelial lesion (ASC-H) 1/31/11     Past Surgical History:   Procedure Laterality Date     C APPENDECTOMY       CV CORONARY ANGIOGRAM N/A 3/11/2019    Procedure: Coronary Angiogram;  Surgeon: Adolph Dave MD;  Location: [facility] CARDIAC CATH LAB     CV PCI ANGIOPLASTY N/A 3/11/2019    Procedure: Percutaneous Coronary Intervention;  Surgeon: Adolph Dave MD;  Location: [facility] CARDIAC CATH LAB     Family History   Problem Relation Age of Onset     C.A.D. Mother         early 60's     Lipids Mother      Heart Disease Mother 68        heart bypass surgery     Hypertension Mother      Thyroid Disease Mother      Heart Disease Father         valvular disease     Thyroid Disease Paternal Aunt      Heart Disease Maternal Grandmother         heart attack     Social History     Socioeconomic History     Marital status:      Spouse name: Not on file     Number of children: 2     Years of education: Not on file     Highest education level: Not on file   Occupational History     Employer: [employer]   Social Needs     Financial " resource strain: Not on file     Food insecurity     Worry: Not on file     Inability: Not on file     Transportation needs     Medical: Not on file     Non-medical: Not on file   Tobacco Use     Smoking status: Former Smoker     Smokeless tobacco: Never Used     Tobacco comment: quit 20 years ago   Substance and Sexual Activity     Alcohol use: Yes     Comment: wine every week     Drug use: No     Sexual activity: Yes     Birth control/protection: Condom   Lifestyle     Physical activity     Days per week: Not on file     Minutes per session: Not on file     Stress: Not on file   Relationships     Social connections     Talks on phone: Not on file     Gets together: Not on file     Attends Sikh service: Not on file     Active member of club or organization: Not on file     Attends meetings of clubs or organizations: Not on file     Relationship status: Not on file     Intimate partner violence     Fear of current or ex partner: Not on file     Emotionally abused: Not on file     Physically abused: Not on file     Forced sexual activity: Not on file   Other Topics Concern      Service Not Asked     Blood Transfusions Not Asked     Caffeine Concern Not Asked     Occupational Exposure Not Asked     Hobby Hazards Not Asked     Sleep Concern Not Asked     Stress Concern Not Asked     Weight Concern Not Asked     Special Diet Not Asked     Back Care Not Asked     Exercise Yes     Bike Helmet Not Asked     Seat Belt Yes     Self-Exams Not Asked     Parent/sibling w/ CABG, MI or angioplasty before 65F 55M? Not Asked   Social History Narrative     Not on file           Allergies:   No known drug allergies      Smitha Solis PA-C  [facility]  Pager: [phone]

## 2020-08-25 ENCOUNTER — TELEPHONE (OUTPATIENT)
Dept: CARDIOLOGY | Facility: CLINIC | Age: 65
End: 2020-08-25

## 2020-08-25 NOTE — TELEPHONE ENCOUNTER
Wellness Screening Tool    Symptom Screening:    Do you have one of the following NEW symptoms:      Fever (subjective or >100.0)?  No    New cough? No    Shortness of breath? No    Chills? No    New loss of taste or smell? No    Generalized body aches? No    New persistent headache? No    New sore throat? No    Nausea, vomiting or diarrhea? No    Within the past 3 weeks, have you been exposed to someone with a known positive illness below?      COVID - 19 (known or suspected) No    Chicken pox?  No    Measles? No    Pertussis? No    Have you had a positive COVID-19 diagnostic test (nasal swab test) in the last 14 days or are you currently   on self-quarantine restrictions (i.e.travel restriction, exposure, etc?) Yes        Patient notified of visitor restriction: Yes  Patient informed to wear a mask: Yes     Krista Zelaya CMA      Patient's appointment status: Patient will be seen in clinic as scheduled on 3:30

## 2020-08-26 DIAGNOSIS — I35.0 NONRHEUMATIC AORTIC VALVE STENOSIS: ICD-10-CM

## 2020-08-26 LAB
ANION GAP SERPL CALCULATED.3IONS-SCNC: 5 MMOL/L (ref 3–14)
BUN SERPL-MCNC: 19 MG/DL (ref 7–30)
CALCIUM SERPL-MCNC: 9.1 MG/DL (ref 8.5–10.1)
CHLORIDE SERPL-SCNC: 103 MMOL/L (ref 94–109)
CO2 SERPL-SCNC: 30 MMOL/L (ref 20–32)
CREAT SERPL-MCNC: 0.97 MG/DL (ref 0.52–1.04)
GFR SERPL CREATININE-BSD FRML MDRD: 62 ML/MIN/{1.73_M2}
GLUCOSE SERPL-MCNC: 77 MG/DL (ref 70–99)
POTASSIUM SERPL-SCNC: 4.2 MMOL/L (ref 3.4–5.3)
SODIUM SERPL-SCNC: 138 MMOL/L (ref 133–144)

## 2020-08-26 PROCEDURE — 36415 COLL VENOUS BLD VENIPUNCTURE: CPT | Performed by: PHYSICIAN ASSISTANT

## 2020-08-26 PROCEDURE — 80048 BASIC METABOLIC PNL TOTAL CA: CPT | Performed by: PHYSICIAN ASSISTANT

## 2020-08-27 ENCOUNTER — OFFICE VISIT (OUTPATIENT)
Dept: CARDIOLOGY | Facility: CLINIC | Age: 65
End: 2020-08-27
Payer: COMMERCIAL

## 2020-08-27 VITALS
WEIGHT: 230.3 LBS | DIASTOLIC BLOOD PRESSURE: 80 MMHG | BODY MASS INDEX: 34.9 KG/M2 | HEIGHT: 68 IN | SYSTOLIC BLOOD PRESSURE: 118 MMHG | HEART RATE: 71 BPM

## 2020-08-27 DIAGNOSIS — I25.10 CORONARY ARTERY DISEASE INVOLVING NATIVE CORONARY ARTERY OF NATIVE HEART WITHOUT ANGINA PECTORIS: ICD-10-CM

## 2020-08-27 DIAGNOSIS — I10 ESSENTIAL HYPERTENSION, BENIGN: ICD-10-CM

## 2020-08-27 DIAGNOSIS — I35.0 NONRHEUMATIC AORTIC VALVE STENOSIS: Primary | ICD-10-CM

## 2020-08-27 PROCEDURE — 99214 OFFICE O/P EST MOD 30 MIN: CPT | Performed by: PHYSICIAN ASSISTANT

## 2020-08-27 RX ORDER — FUROSEMIDE 40 MG
40 TABLET ORAL DAILY
Qty: 90 TABLET | Refills: 3 | Status: SHIPPED | OUTPATIENT
Start: 2020-08-27 | End: 2021-08-23

## 2020-08-27 RX ORDER — POTASSIUM CHLORIDE 1500 MG/1
20 TABLET, EXTENDED RELEASE ORAL DAILY
Qty: 90 TABLET | Refills: 3 | Status: SHIPPED | OUTPATIENT
Start: 2020-08-27 | End: 2021-08-23

## 2020-08-27 RX ORDER — LISINOPRIL 10 MG/1
10 TABLET ORAL DAILY
Qty: 90 TABLET | Refills: 3 | Status: SHIPPED | OUTPATIENT
Start: 2020-08-27 | End: 2021-08-23

## 2020-08-27 ASSESSMENT — MIFFLIN-ST. JEOR: SCORE: 1648.13

## 2020-08-27 NOTE — LETTER
"8/27/2020    Sylvia Bird MD  [address]  [facility] [address]    RE: Heather Bauer       Dear Colleague,    I had the pleasure of seeing Heather Bauer in the [facility].    Please see separate dictation for HPI, PHYSICAL EXAM AND IMPRESSION/PLAN.    CURRENT MEDICATIONS:  Current Outpatient Medications   Medication Sig Dispense Refill     aspirin 81 MG EC tablet Take 81 mg by mouth daily       atorvastatin (LIPITOR) 40 MG tablet Take 1 tablet (40 mg) by mouth daily 90 tablet 3     furosemide (LASIX) 40 MG tablet Take 1 tablet (40 mg) by mouth daily 30 tablet 1     lisinopril (ZESTRIL) 10 MG tablet Take 1 tablet (10 mg) by mouth daily 30 tablet 1     metoprolol succinate ER (TOPROL XL) 25 MG 24 hr tablet Take 0.5 tablets (12.5 mg) by mouth daily 45 tablet 3     MULTIPLE MINERALS-VITAMINS OR TABS        nitroGLYcerin (NITROSTAT) 0.4 MG sublingual tablet One tablet under the tongue every 5 minutes if needed for chest pain. May repeat every 5 minutes for a maximum of 3 doses in 15 minutes\" 25 tablet 3     potassium chloride ER (KLOR-CON M) 20 MEQ CR tablet Take 1 tablet (20 mEq) by mouth daily 30 tablet 1       ALLERGIES:     Allergies   Allergen Reactions     No Known Drug Allergies        PAST MEDICAL HISTORY:  Past Medical History:   Diagnosis Date     Aortic valve stenosis      Essential hypertension, benign      Fracture, tibia, shaft      History of colposcopy with cervical biopsy 2/22/11    AMRITA I     Papanicolaou smear of vagina with atypical squamous cells cannot exclude high grade squamous intraepithelial lesion (ASC-H) 1/31/11       PAST SURGICAL HISTORY:  Past Surgical History:   Procedure Laterality Date     C APPENDECTOMY       CV CORONARY ANGIOGRAM N/A 3/11/2019    Procedure: Coronary Angiogram;  Surgeon: Adolph Dave MD;  Location: [facility] CARDIAC CATH LAB     CV PCI ANGIOPLASTY N/A 3/11/2019    Procedure: Percutaneous Coronary Intervention;  " Surgeon: Adolph Dave MD;  Location:  HEART CARDIAC CATH LAB       SOCIAL HISTORY:  Social History     Socioeconomic History     Marital status:      Spouse name: None     Number of children: 2     Years of education: None     Highest education level: None   Occupational History     Employer: [employer]   Social Needs     Financial resource strain: None     Food insecurity     Worry: None     Inability: None     Transportation needs     Medical: None     Non-medical: None   Tobacco Use     Smoking status: Former Smoker     Smokeless tobacco: Never Used     Tobacco comment: quit 20 years ago   Substance and Sexual Activity     Alcohol use: Yes     Comment: wine every week     Drug use: No     Sexual activity: Yes     Birth control/protection: Condom   Lifestyle     Physical activity     Days per week: None     Minutes per session: None     Stress: None   Relationships     Social connections     Talks on phone: None     Gets together: None     Attends Congregational service: None     Active member of club or organization: None     Attends meetings of clubs or organizations: None     Relationship status: None     Intimate partner violence     Fear of current or ex partner: None     Emotionally abused: None     Physically abused: None     Forced sexual activity: None   Other Topics Concern      Service Not Asked     Blood Transfusions Not Asked     Caffeine Concern Not Asked     Occupational Exposure Not Asked     Hobby Hazards Not Asked     Sleep Concern Not Asked     Stress Concern Not Asked     Weight Concern Not Asked     Special Diet Not Asked     Back Care Not Asked     Exercise Yes     Bike Helmet Not Asked     Seat Belt Yes     Self-Exams Not Asked     Parent/sibling w/ CABG, MI or angioplasty before 65F 55M? Not Asked   Social History Narrative     None       FAMILY HISTORY:  Family History   Problem Relation Age of Onset     C.A.D. Mother         early 60's     Lipids Mother      Heart  Disease Mother 68        heart bypass surgery     Hypertension Mother      Thyroid Disease Mother      Heart Disease Father         valvular disease     Thyroid Disease Paternal Aunt      Heart Disease Maternal Grandmother         heart attack       Review of Systems:  Skin:  Negative       Eyes:  Positive for glasses    ENT:  Negative      Respiratory:  Positive for dyspnea on exertion     Cardiovascular:  Negative      Gastroenterology: Negative      Genitourinary:  Negative      Musculoskeletal:  Negative      Neurologic:  Negative      Psychiatric:  Negative      Heme/Lymph/Imm:         Endocrine:  Negative         Reviewed. Remainder of the note dictated.    Blanca Ash PA-C        Thank you for allowing me to participate in the care of your patient.      Sincerely,     Blanca Ash PA-C     [facility]    cc:   Sylvia Bird MD  [address] [place] [address]

## 2020-08-27 NOTE — PATIENT INSTRUCTIONS
Thank you for your [facility] visit today. Your provider has recommended the following:  Medication Changes:  No changes today  Recommendations:  Please call us if the shortness of breath with activity or at night returns. Or if you get chest discomfort/heaviness/pressure or feel like you might pass out.   Follow-up:  See Dr Dave for cardiology follow up in Jan 2021 as planned with an echo and lab work prior.    Reminder:  Please bring in your current medication list or your medication, over the counter supplements and vitamin bottles as we will review these at each office visit.

## 2020-08-27 NOTE — LETTER
8/27/2020      Sylvia Bird MD  [address]  [facility] [address]      RE: Heather Mcadamsromel       Dear Colleague,    I had the pleasure of seeing Heather Bauer in the [facility].    Service Date: 08/27/2020      HISTORY OF PRESENT ILLNESS:  Heather is a pleasant 65-year-old female who presents to the office today after a recent change in her medical therapy.  Again, her cardiac history includes coronary artery disease, status post PCI with drug-eluting stent placement to the LAD in 03/2019, hypertension and moderate to severe aortic stenosis.      In July of this year, the patient was seen by ORVILLE Maxwell, for a routine 6-month followup.  Just prior to that office visit, she had a repeat echocardiogram which again showed moderate to severe valvular aortic stenosis with a mean gradient of 35 mmHg and aortic valve area of 0.8 cm2, mild tricuspid regurgitation and mild pulmonary hypertension.  Her EF was estimated at 55%-60%.  At that office visit, she described some mildly progressive dyspnea on exertion as well as orthopnea.  Additionally, she mentioned some atypical chest discomfort.  Hydrochlorothiazide was discontinued, and she was started on furosemide.  She did have a followup with [facility]a earlier this month, and at that time her symptoms had not changed much.  Her furosemide was increased to 40 mg daily, and her lisinopril was decreased to 10 mg daily.      She presents today for followup.  She states that she does feel that her dyspnea on exertion as well as the orthopnea have improved.  She does not describe any chest discomfort today.  She has not been having any lightheadedness, dizziness, presyncope or syncope.  She denies any significant lower extremity edema.  She really does not have any new questions or concerns.  She denies any medication side effects.      CURRENT CARDIAC MEDICATIONS:   1.  Aspirin 81 mg daily.   2.  Atorvastatin 40 mg daily.   3.   Furosemide 40 mg daily.   4.  Lisinopril 10 mg daily.   5.  Toprol-XL 25 mg half tablet daily.   6.  Potassium chloride 20 mEq daily.   7.  Sublingual nitro p.r.n.      The remainder of her medications, allergies and review of systems were reviewed and as are documented separately.      PHYSICAL EXAMINATION:   GENERAL:  The patient is a pleasant 65-year-old female who appears her stated age.  She is in no apparent distress.   VITAL SIGNS:  Her blood pressure is 118/80, pulse 71, weight is 230 pounds, which is down 10 pounds on our office scale compared to January of this year.   PULMONARY:  Breathing is nonlabored, and lungs are clear to auscultation bilaterally.   CARDIAC:  Examination reveals a regular rate and rhythm.  She does have a 2/6 systolic ejection murmur heard best at the upper left sternal border.   EXTREMITIES:  Lower extremities show trace edema at the ankles.   NEUROLOGIC:  Alert and oriented.      LABORATORY DATA:  She had a basic metabolic panel yesterday.  Her sodium was 138, potassium 4.2, BUN is 19, creatinine 0.97.      ASSESSMENT AND PLAN:  Ms. Bauer is a pleasant 65-year-old female with a history of coronary artery disease with prior LAD PCI, hypertension and moderate to severe aortic stenosis.  She had been noticing some dyspnea on exertion as well as orthopnea.  With an increase in diuretic therapy, her symptoms have resolved.  Overall, she seems to be doing well.  Her basic metabolic panel was stable.  We will continue with the plan to have her get a repeat echocardiogram, lab work and have a followup with Dr. Dave in 01/2021.  Of course, I encouraged the patient to contact us sooner with any recurrent symptoms or new symptoms, specifically chest discomfort or presyncope/syncope.  Patient demonstrated understanding.      Thank you for allowing us to participate in the care of this pleasant patient.         SYDNI PICKETT PA-C             D: 08/27/2020   T: 08/27/2020   MT:  "      Name:     ABBY ASHTON   MRN:      [id number]        Account:      [id number]   :      1955           Service Date: 2020      Document: [id number]         Outpatient Encounter Medications as of 2020   Medication Sig Dispense Refill     aspirin 81 MG EC tablet Take 81 mg by mouth daily       atorvastatin (LIPITOR) 40 MG tablet Take 1 tablet (40 mg) by mouth daily 90 tablet 3     furosemide (LASIX) 40 MG tablet Take 1 tablet (40 mg) by mouth daily 90 tablet 3     lisinopril (ZESTRIL) 10 MG tablet Take 1 tablet (10 mg) by mouth daily 90 tablet 3     metoprolol succinate ER (TOPROL XL) 25 MG 24 hr tablet Take 0.5 tablets (12.5 mg) by mouth daily 45 tablet 3     MULTIPLE MINERALS-VITAMINS OR TABS        nitroGLYcerin (NITROSTAT) 0.4 MG sublingual tablet One tablet under the tongue every 5 minutes if needed for chest pain. May repeat every 5 minutes for a maximum of 3 doses in 15 minutes\" 25 tablet 3     potassium chloride ER (KLOR-CON M) 20 MEQ CR tablet Take 1 tablet (20 mEq) by mouth daily 90 tablet 3     [DISCONTINUED] furosemide (LASIX) 40 MG tablet Take 1 tablet (40 mg) by mouth daily 30 tablet 1     [DISCONTINUED] lisinopril (ZESTRIL) 10 MG tablet Take 1 tablet (10 mg) by mouth daily 30 tablet 1     [DISCONTINUED] potassium chloride ER (KLOR-CON M) 20 MEQ CR tablet Take 1 tablet (20 mEq) by mouth daily 30 tablet 1     No facility-administered encounter medications on file as of 2020.        Again, thank you for allowing me to participate in the care of your patient.      Sincerely,    Blanca Ash PA-C     [facility]    "

## 2020-08-27 NOTE — PROGRESS NOTES
"Please see separate dictation for HPI, PHYSICAL EXAM AND IMPRESSION/PLAN.    CURRENT MEDICATIONS:  Current Outpatient Medications   Medication Sig Dispense Refill     aspirin 81 MG EC tablet Take 81 mg by mouth daily       atorvastatin (LIPITOR) 40 MG tablet Take 1 tablet (40 mg) by mouth daily 90 tablet 3     furosemide (LASIX) 40 MG tablet Take 1 tablet (40 mg) by mouth daily 30 tablet 1     lisinopril (ZESTRIL) 10 MG tablet Take 1 tablet (10 mg) by mouth daily 30 tablet 1     metoprolol succinate ER (TOPROL XL) 25 MG 24 hr tablet Take 0.5 tablets (12.5 mg) by mouth daily 45 tablet 3     MULTIPLE MINERALS-VITAMINS OR TABS        nitroGLYcerin (NITROSTAT) 0.4 MG sublingual tablet One tablet under the tongue every 5 minutes if needed for chest pain. May repeat every 5 minutes for a maximum of 3 doses in 15 minutes\" 25 tablet 3     potassium chloride ER (KLOR-CON M) 20 MEQ CR tablet Take 1 tablet (20 mEq) by mouth daily 30 tablet 1       ALLERGIES:     Allergies   Allergen Reactions     No Known Drug Allergies        PAST MEDICAL HISTORY:  Past Medical History:   Diagnosis Date     Aortic valve stenosis      Essential hypertension, benign      Fracture, tibia, shaft      History of colposcopy with cervical biopsy 2/22/11    AMRITA I     Papanicolaou smear of vagina with atypical squamous cells cannot exclude high grade squamous intraepithelial lesion (ASC-H) 1/31/11       PAST SURGICAL HISTORY:  Past Surgical History:   Procedure Laterality Date     C APPENDECTOMY       CV CORONARY ANGIOGRAM N/A 3/11/2019    Procedure: Coronary Angiogram;  Surgeon: Adolph Dave MD;  Location: [facility] CARDIAC CATH LAB     CV PCI ANGIOPLASTY N/A 3/11/2019    Procedure: Percutaneous Coronary Intervention;  Surgeon: Adolph Dave MD;  Location: [facility] CARDIAC CATH LAB       SOCIAL HISTORY:  Social History     Socioeconomic History     Marital status:      Spouse name: None     Number of children: 2     Years of " education: None     Highest education level: None   Occupational History     Employer: [employer]   Social Needs     Financial resource strain: None     Food insecurity     Worry: None     Inability: None     Transportation needs     Medical: None     Non-medical: None   Tobacco Use     Smoking status: Former Smoker     Smokeless tobacco: Never Used     Tobacco comment: quit 20 years ago   Substance and Sexual Activity     Alcohol use: Yes     Comment: wine every week     Drug use: No     Sexual activity: Yes     Birth control/protection: Condom   Lifestyle     Physical activity     Days per week: None     Minutes per session: None     Stress: None   Relationships     Social connections     Talks on phone: None     Gets together: None     Attends Confucianist service: None     Active member of club or organization: None     Attends meetings of clubs or organizations: None     Relationship status: None     Intimate partner violence     Fear of current or ex partner: None     Emotionally abused: None     Physically abused: None     Forced sexual activity: None   Other Topics Concern      Service Not Asked     Blood Transfusions Not Asked     Caffeine Concern Not Asked     Occupational Exposure Not Asked     Hobby Hazards Not Asked     Sleep Concern Not Asked     Stress Concern Not Asked     Weight Concern Not Asked     Special Diet Not Asked     Back Care Not Asked     Exercise Yes     Bike Helmet Not Asked     Seat Belt Yes     Self-Exams Not Asked     Parent/sibling w/ CABG, MI or angioplasty before 65F 55M? Not Asked   Social History Narrative     None       FAMILY HISTORY:  Family History   Problem Relation Age of Onset     C.A.D. Mother         early 60's     Lipids Mother      Heart Disease Mother 68        heart bypass surgery     Hypertension Mother      Thyroid Disease Mother      Heart Disease Father         valvular disease     Thyroid Disease Paternal Aunt      Heart Disease Maternal Grandmother          heart attack       Review of Systems:  Skin:  Negative       Eyes:  Positive for glasses    ENT:  Negative      Respiratory:  Positive for dyspnea on exertion     Cardiovascular:  Negative      Gastroenterology: Negative      Genitourinary:  Negative      Musculoskeletal:  Negative      Neurologic:  Negative      Psychiatric:  Negative      Heme/Lymph/Imm:         Endocrine:  Negative         Reviewed. Remainder of the note dictated.    Blanca Ash PA-C

## 2020-08-28 NOTE — PROGRESS NOTES
Service Date: 08/27/2020      HISTORY OF PRESENT ILLNESS:  Heather is a pleasant 65-year-old female who presents to the office today after a recent change in her medical therapy.  Again, her cardiac history includes coronary artery disease, status post PCI with drug-eluting stent placement to the LAD in 03/2019, hypertension and moderate to severe aortic stenosis.      In July of this year, the patient was seen by ORVILLE Maxwell, for a routine 6-month followup.  Just prior to that office visit, she had a repeat echocardiogram which again showed moderate to severe valvular aortic stenosis with a mean gradient of 35 mmHg and aortic valve area of 0.8 cm2, mild tricuspid regurgitation and mild pulmonary hypertension.  Her EF was estimated at 55%-60%.  At that office visit, she described some mildly progressive dyspnea on exertion as well as orthopnea.  Additionally, she mentioned some atypical chest discomfort.  Hydrochlorothiazide was discontinued, and she was started on furosemide.  She did have a followup with Smitha earlier this month, and at that time her symptoms had not changed much.  Her furosemide was increased to 40 mg daily, and her lisinopril was decreased to 10 mg daily.      She presents today for followup.  She states that she does feel that her dyspnea on exertion as well as the orthopnea have improved.  She does not describe any chest discomfort today.  She has not been having any lightheadedness, dizziness, presyncope or syncope.  She denies any significant lower extremity edema.  She really does not have any new questions or concerns.  She denies any medication side effects.      CURRENT CARDIAC MEDICATIONS:   1.  Aspirin 81 mg daily.   2.  Atorvastatin 40 mg daily.   3.  Furosemide 40 mg daily.   4.  Lisinopril 10 mg daily.   5.  Toprol-XL 25 mg half tablet daily.   6.  Potassium chloride 20 mEq daily.   7.  Sublingual nitro p.r.n.      The remainder of her medications, allergies and review of systems  were reviewed and as are documented separately.      PHYSICAL EXAMINATION:   GENERAL:  The patient is a pleasant 65-year-old female who appears her stated age.  She is in no apparent distress.   VITAL SIGNS:  Her blood pressure is 118/80, pulse 71, weight is 230 pounds, which is down 10 pounds on our office scale compared to January of this year.   PULMONARY:  Breathing is nonlabored, and lungs are clear to auscultation bilaterally.   CARDIAC:  Examination reveals a regular rate and rhythm.  She does have a 2/6 systolic ejection murmur heard best at the upper left sternal border.   EXTREMITIES:  Lower extremities show trace edema at the ankles.   NEUROLOGIC:  Alert and oriented.      LABORATORY DATA:  She had a basic metabolic panel yesterday.  Her sodium was 138, potassium 4.2, BUN is 19, creatinine 0.97.      ASSESSMENT AND PLAN:  Ms. Ashton is a pleasant 65-year-old female with a history of coronary artery disease with prior LAD PCI, hypertension and moderate to severe aortic stenosis.  She had been noticing some dyspnea on exertion as well as orthopnea.  With an increase in diuretic therapy, her symptoms have resolved.  Overall, she seems to be doing well.  Her basic metabolic panel was stable.  We will continue with the plan to have her get a repeat echocardiogram, lab work and have a followup with Dr. Dave in 2021.  Of course, I encouraged the patient to contact us sooner with any recurrent symptoms or new symptoms, specifically chest discomfort or presyncope/syncope.  Patient demonstrated understanding.      Thank you for allowing us to participate in the care of this pleasant patient.         SYDNI PICKETT PA-C             D: 2020   T: 2020   MT: [email or web address]      Name:     ABBY ASHTON   MRN:      [id number]        Account:      [id number]   :      1955           Service Date: 2020      Document: [id number]

## 2021-01-15 ENCOUNTER — HEALTH MAINTENANCE LETTER (OUTPATIENT)
Age: 66
End: 2021-01-15

## 2021-01-21 ENCOUNTER — HOSPITAL ENCOUNTER (OUTPATIENT)
Dept: CARDIOLOGY | Facility: CLINIC | Age: 66
Discharge: HOME | End: 2021-01-21
Attending: INTERNAL MEDICINE | Admitting: INTERNAL MEDICINE
Payer: COMMERCIAL

## 2021-01-21 DIAGNOSIS — I35.0 NONRHEUMATIC AORTIC VALVE STENOSIS: ICD-10-CM

## 2021-01-21 DIAGNOSIS — I25.10 CORONARY ARTERY DISEASE INVOLVING NATIVE CORONARY ARTERY OF NATIVE HEART WITHOUT ANGINA PECTORIS: ICD-10-CM

## 2021-01-21 LAB
ANION GAP SERPL CALCULATED.3IONS-SCNC: 1 MMOL/L (ref 3–14)
BUN SERPL-MCNC: 19 MG/DL (ref 7–30)
CALCIUM SERPL-MCNC: 9.7 MG/DL (ref 8.5–10.1)
CHLORIDE SERPL-SCNC: 106 MMOL/L (ref 94–109)
CHOLEST SERPL-MCNC: 135 MG/DL
CO2 SERPL-SCNC: 33 MMOL/L (ref 20–32)
CREAT SERPL-MCNC: 0.82 MG/DL (ref 0.52–1.04)
GFR SERPL CREATININE-BSD FRML MDRD: 76 ML/MIN/{1.73_M2}
GLUCOSE SERPL-MCNC: 95 MG/DL (ref 70–99)
HDLC SERPL-MCNC: 75 MG/DL
LDLC SERPL CALC-MCNC: 48 MG/DL
NONHDLC SERPL-MCNC: 60 MG/DL
POTASSIUM SERPL-SCNC: 4.2 MMOL/L (ref 3.4–5.3)
SODIUM SERPL-SCNC: 140 MMOL/L (ref 133–144)
TRIGL SERPL-MCNC: 59 MG/DL

## 2021-01-21 PROCEDURE — 80048 BASIC METABOLIC PNL TOTAL CA: CPT | Performed by: INTERNAL MEDICINE

## 2021-01-21 PROCEDURE — 93306 TTE W/DOPPLER COMPLETE: CPT

## 2021-01-21 PROCEDURE — 93306 TTE W/DOPPLER COMPLETE: CPT | Mod: 26 | Performed by: INTERNAL MEDICINE

## 2021-01-21 PROCEDURE — 80061 LIPID PANEL: CPT | Performed by: INTERNAL MEDICINE

## 2021-01-21 PROCEDURE — 36415 COLL VENOUS BLD VENIPUNCTURE: CPT | Performed by: INTERNAL MEDICINE

## 2021-02-16 ENCOUNTER — PRE VISIT (OUTPATIENT)
Dept: CARDIOLOGY | Facility: CLINIC | Age: 66
End: 2021-02-16

## 2021-03-08 ENCOUNTER — OFFICE VISIT (OUTPATIENT)
Dept: CARDIOLOGY | Facility: CLINIC | Age: 66
End: 2021-03-08
Attending: INTERNAL MEDICINE
Payer: COMMERCIAL

## 2021-03-08 VITALS
HEIGHT: 68 IN | OXYGEN SATURATION: 100 % | SYSTOLIC BLOOD PRESSURE: 135 MMHG | WEIGHT: 226 LBS | DIASTOLIC BLOOD PRESSURE: 88 MMHG | BODY MASS INDEX: 34.25 KG/M2 | HEART RATE: 75 BPM

## 2021-03-08 DIAGNOSIS — E66.811 CLASS 1 OBESITY DUE TO EXCESS CALORIES WITH SERIOUS COMORBIDITY AND BODY MASS INDEX (BMI) OF 34.0 TO 34.9 IN ADULT: ICD-10-CM

## 2021-03-08 DIAGNOSIS — I35.0 NONRHEUMATIC AORTIC VALVE STENOSIS: Primary | ICD-10-CM

## 2021-03-08 DIAGNOSIS — E66.09 CLASS 1 OBESITY DUE TO EXCESS CALORIES WITH SERIOUS COMORBIDITY AND BODY MASS INDEX (BMI) OF 34.0 TO 34.9 IN ADULT: ICD-10-CM

## 2021-03-08 DIAGNOSIS — R06.09 DOE (DYSPNEA ON EXERTION): ICD-10-CM

## 2021-03-08 DIAGNOSIS — E78.00 HYPERCHOLESTEREMIA: ICD-10-CM

## 2021-03-08 DIAGNOSIS — I25.10 CORONARY ARTERY DISEASE INVOLVING NATIVE CORONARY ARTERY OF NATIVE HEART WITHOUT ANGINA PECTORIS: ICD-10-CM

## 2021-03-08 DIAGNOSIS — I10 ESSENTIAL HYPERTENSION, BENIGN: ICD-10-CM

## 2021-03-08 PROCEDURE — 99214 OFFICE O/P EST MOD 30 MIN: CPT | Performed by: INTERNAL MEDICINE

## 2021-03-08 RX ORDER — ATORVASTATIN CALCIUM 40 MG/1
40 TABLET, FILM COATED ORAL DAILY
Qty: 90 TABLET | Refills: 3 | Status: SHIPPED | OUTPATIENT
Start: 2021-03-08 | End: 2022-02-21

## 2021-03-08 RX ORDER — METOPROLOL SUCCINATE 25 MG/1
12.5 TABLET, EXTENDED RELEASE ORAL DAILY
Qty: 45 TABLET | Refills: 3 | Status: SHIPPED | OUTPATIENT
Start: 2021-03-08 | End: 2022-02-21

## 2021-03-08 ASSESSMENT — MIFFLIN-ST. JEOR: SCORE: 1623.63

## 2021-03-08 NOTE — PROGRESS NOTES
Service Date: 03/08/2021      Heather is a very nice 66-year-old woman with past medical history significant for family history of coronary artery disease and a family history of valvular heart disease.  She is a former smoker, having quit in 1999.  She has obesity, hypertension, aortic stenosis and coronary artery disease.  Heather's mother was a smoker and had coronary artery bypass grafting x3 in her 60s.  Her father had valve surgery.  She does not know the details.      I met Heather in 02/2019 because of chief complaint of dyspnea on exertion.  Echocardiogram demonstrated ejection fraction of 60% with moderate to severe aortic stenosis, mean gradient of 33, valve area of 0.9, dimensionless index was 27, stroke volume was 39 mm/meter.  I set her up for a stress echocardiogram which was positive, leading to stenting of her left anterior descending artery.  She was seen in [facility] and it was felt her valve was more in the moderate range and to continue with medical management.      Heather returns to the clinic and states she thinks she is doing better than in the past.  She stopped caffeine and she thinks she has less shortness of breath with this.  She still states she does get short of breath if she goes up 2 flights of stairs but has no problems with day-to-day activities.  She does walk regularly.  She states she will get short of breath going up the hills.  She states her  think she is doing well.  She has no chest, arm, neck, jaw or shoulder discomfort.  She has no lightheadedness, dizziness, syncope or near-syncope.  She notes no problems or side effects with her current medical regimen.      ASSESSMENT AND PLAN:  Heather has no symptoms at this time to suggest ischemia.  She does not appear to have any heart failure or significant arrhythmias.      Reevaluation of her echocardiogram and aortic stenosis this year appears to be unchanged from previous.  Ejection fraction is 55%-60%.  Aortic valve is  bicuspid with a mean gradient of 36 mmHg.  Aortic valve area appears to be 0.8 with a dimensionless index of 0.26 and appears to be unchanged from previous.  After some discussion, it appears that she is stable and I think we will just follow up with an echocardiogram in 6 months.  Her physical exam is consistent with moderate to severe aortic stenosis.  I have told if she should have any change, develop chest pain, lightheadedness, dizziness that she needs to call us.  Otherwise, we will do a followup echocardiogram in 6 months.      Blood pressure is well controlled today at 135/88.      Weight is 226 pounds, which is down about 15 pounds from a year and a half ago.  She states she has just changed her diet a small amount.  She is not eating bagels for breakfast, making smoothies and she is trying to be a little bit more attentive to what she is eating.      Fasting lipid profile is outstanding.  Total cholesterol is 135, HDL 75, LDL 48, triglycerides are 59.      Basic metabolic profile is normal with a creatinine of 0.82 and normal electrolytes.      We reviewed her medications.  We will continue them all as is.      Thank you for allowing me to participate in her care.         MELVINA KIMBROUGH MD, [facility]             D: 2021   T: 2021   MT: alesia      Name:     ABBY ASHTON   MRN:      [id number]        Account:      [id number]   :      1955           Service Date: 2021      Document: [id number]

## 2021-03-08 NOTE — LETTER
3/8/2021    Sylvia Bird MD  [address]  [facility] [address]    RE: Heather Bauer       Dear Colleague,    I had the pleasure of seeing Heather Bauer in the [facility].    HPI and Plan:   See dictation    Orders Placed This Encounter   Procedures     Basic metabolic panel     Basic metabolic panel     Lipid Profile     Follow-Up with Cardiac Advanced Practice Provider     Follow-Up with Cardiologist     Echocardiogram Complete       Orders Placed This Encounter   Medications     atorvastatin (LIPITOR) 40 MG tablet     Sig: Take 1 tablet (40 mg) by mouth daily     Dispense:  90 tablet     Refill:  3     metoprolol succinate ER (TOPROL XL) 25 MG 24 hr tablet     Sig: Take 0.5 tablets (12.5 mg) by mouth daily     Dispense:  45 tablet     Refill:  3       Medications Discontinued During This Encounter   Medication Reason     atorvastatin (LIPITOR) 40 MG tablet Reorder     metoprolol succinate ER (TOPROL XL) 25 MG 24 hr tablet Reorder         Encounter Diagnoses   Name Primary?     Nonrheumatic aortic valve stenosis Yes     Coronary artery disease involving native coronary artery of native heart without angina pectoris      Essential hypertension, benign      BORJA (dyspnea on exertion)      Hypercholesteremia      Class 1 obesity due to excess calories with serious comorbidity and body mass index (BMI) of 34.0 to 34.9 in adult        CURRENT MEDICATIONS:  Current Outpatient Medications   Medication Sig Dispense Refill     aspirin 81 MG EC tablet Take 81 mg by mouth daily       atorvastatin (LIPITOR) 40 MG tablet Take 1 tablet (40 mg) by mouth daily 90 tablet 3     furosemide (LASIX) 40 MG tablet Take 1 tablet (40 mg) by mouth daily 90 tablet 3     lisinopril (ZESTRIL) 10 MG tablet Take 1 tablet (10 mg) by mouth daily 90 tablet 3     metoprolol succinate ER (TOPROL XL) 25 MG 24 hr tablet Take 0.5 tablets (12.5 mg) by mouth daily 45 tablet 3      "MULTIPLE MINERALS-VITAMINS OR TABS        nitroGLYcerin (NITROSTAT) 0.4 MG sublingual tablet One tablet under the tongue every 5 minutes if needed for chest pain. May repeat every 5 minutes for a maximum of 3 doses in 15 minutes\" 25 tablet 3     potassium chloride ER (KLOR-CON M) 20 MEQ CR tablet Take 1 tablet (20 mEq) by mouth daily 90 tablet 3       ALLERGIES     Allergies   Allergen Reactions     No Known Drug Allergies        PAST MEDICAL HISTORY:  Past Medical History:   Diagnosis Date     Aortic valve stenosis      Essential hypertension, benign      Fracture, tibia, shaft      History of colposcopy with cervical biopsy 2/22/11    AMRITA I     Papanicolaou smear of vagina with atypical squamous cells cannot exclude high grade squamous intraepithelial lesion (ASC-H) 1/31/11       PAST SURGICAL HISTORY:  Past Surgical History:   Procedure Laterality Date     C APPENDECTOMY       CV CORONARY ANGIOGRAM N/A 3/11/2019    Procedure: Coronary Angiogram;  Surgeon: Adolph Dave MD;  Location: [facility] CARDIAC CATH LAB     CV PCI ANGIOPLASTY N/A 3/11/2019    Procedure: Percutaneous Coronary Intervention;  Surgeon: Adolph Dave MD;  Location:  HEART CARDIAC CATH LAB       FAMILY HISTORY:  Family History   Problem Relation Age of Onset     C.A.D. Mother         early 60's     Lipids Mother      Heart Disease Mother 68        heart bypass surgery     Hypertension Mother      Thyroid Disease Mother      Heart Disease Father         valvular disease     Thyroid Disease Paternal Aunt      Heart Disease Maternal Grandmother         heart attack       SOCIAL HISTORY:  Social History     Socioeconomic History     Marital status:      Spouse name: None     Number of children: 2     Years of education: None     Highest education level: None   Occupational History     Employer: [employer]   Social Needs     Financial resource strain: None     Food insecurity     Worry: None     Inability: None     " Transportation needs     Medical: None     Non-medical: None   Tobacco Use     Smoking status: Former Smoker     Smokeless tobacco: Never Used     Tobacco comment: quit 20 years ago   Substance and Sexual Activity     Alcohol use: Yes     Comment: wine every week     Drug use: No     Sexual activity: Yes     Birth control/protection: Condom   Lifestyle     Physical activity     Days per week: None     Minutes per session: None     Stress: None   Relationships     Social connections     Talks on phone: None     Gets together: None     Attends Sikhism service: None     Active member of club or organization: None     Attends meetings of clubs or organizations: None     Relationship status: None     Intimate partner violence     Fear of current or ex partner: None     Emotionally abused: None     Physically abused: None     Forced sexual activity: None   Other Topics Concern      Service Not Asked     Blood Transfusions Not Asked     Caffeine Concern Not Asked     Occupational Exposure Not Asked     Hobby Hazards Not Asked     Sleep Concern Not Asked     Stress Concern Not Asked     Weight Concern Not Asked     Special Diet Not Asked     Back Care Not Asked     Exercise Yes     Bike Helmet Not Asked     Seat Belt Yes     Self-Exams Not Asked     Parent/sibling w/ CABG, MI or angioplasty before 65F 55M? Not Asked   Social History Narrative     None       Review of Systems:  Skin:  Negative       Eyes:  Positive for glasses    ENT:  Negative      Respiratory:  Negative   since stopping caffine has not any SOB (coffee)   Cardiovascular:  Negative      Gastroenterology: Negative      Genitourinary:  Positive for urinary frequency due nto water pill  Musculoskeletal:  Positive for joint pain hurt right arm at work  Neurologic:  Negative      Psychiatric:  Negative      Heme/Lymph/Imm:  Negative      Endocrine:  Negative        Physical Exam:  Vitals: /88 (BP Location: Right arm, Patient Position: Sitting,  "Cuff Size: Adult Large)   Pulse 75   Ht 1.727 m (5' 8\")   Wt 102.5 kg (226 lb)   LMP 07/07/2008   SpO2 100%   BMI 34.36 kg/m      Constitutional:  cooperative, alert and oriented, well developed, well nourished, in no acute distress obese      Skin:  warm and dry to the touch, no apparent skin lesions or masses noted          Head:  normocephalic, no masses or lesions        Eyes:  pupils equal and round, conjunctivae and lids unremarkable, sclera white, no xanthalasma, EOMS intact, no nystagmus        Lymph:      ENT:  no pallor or cyanosis, dentition good        Neck:  carotid pulses are full and equal bilaterally;no carotid bruit        Respiratory:  normal breath sounds, clear to auscultation, normal A-P diameter, normal symmetry, normal respiratory excursion, no use of accessory muscles         Cardiac: regular rhythm       systolic murmur;radiation to the carotid;RUSB;grade 3     mid to late peaking  pulses full and equal                                        GI:    obese      Extremities and Muscular Skeletal:  no edema;no spinal abnormalities noted;normal muscle strength and tone              Neurological:  no gross motor deficits        Psych:  affect appropriate, oriented to time, person and place        Thank you for allowing me to participate in the care of your patient.      Sincerely,     Adolph Dave MD     [facility]    cc:   Sylvia Bird MD  [address] [place] [address]        "

## 2021-03-08 NOTE — PROGRESS NOTES
"HPI and Plan:   See dictation    Orders Placed This Encounter   Procedures     Basic metabolic panel     Basic metabolic panel     Lipid Profile     Follow-Up with Cardiac Advanced Practice Provider     Follow-Up with Cardiologist     Echocardiogram Complete       Orders Placed This Encounter   Medications     atorvastatin (LIPITOR) 40 MG tablet     Sig: Take 1 tablet (40 mg) by mouth daily     Dispense:  90 tablet     Refill:  3     metoprolol succinate ER (TOPROL XL) 25 MG 24 hr tablet     Sig: Take 0.5 tablets (12.5 mg) by mouth daily     Dispense:  45 tablet     Refill:  3       Medications Discontinued During This Encounter   Medication Reason     atorvastatin (LIPITOR) 40 MG tablet Reorder     metoprolol succinate ER (TOPROL XL) 25 MG 24 hr tablet Reorder         Encounter Diagnoses   Name Primary?     Nonrheumatic aortic valve stenosis Yes     Coronary artery disease involving native coronary artery of native heart without angina pectoris      Essential hypertension, benign      BORJA (dyspnea on exertion)      Hypercholesteremia      Class 1 obesity due to excess calories with serious comorbidity and body mass index (BMI) of 34.0 to 34.9 in adult        CURRENT MEDICATIONS:  Current Outpatient Medications   Medication Sig Dispense Refill     aspirin 81 MG EC tablet Take 81 mg by mouth daily       atorvastatin (LIPITOR) 40 MG tablet Take 1 tablet (40 mg) by mouth daily 90 tablet 3     furosemide (LASIX) 40 MG tablet Take 1 tablet (40 mg) by mouth daily 90 tablet 3     lisinopril (ZESTRIL) 10 MG tablet Take 1 tablet (10 mg) by mouth daily 90 tablet 3     metoprolol succinate ER (TOPROL XL) 25 MG 24 hr tablet Take 0.5 tablets (12.5 mg) by mouth daily 45 tablet 3     MULTIPLE MINERALS-VITAMINS OR TABS        nitroGLYcerin (NITROSTAT) 0.4 MG sublingual tablet One tablet under the tongue every 5 minutes if needed for chest pain. May repeat every 5 minutes for a maximum of 3 doses in 15 minutes\" 25 tablet 3     " potassium chloride ER (KLOR-CON M) 20 MEQ CR tablet Take 1 tablet (20 mEq) by mouth daily 90 tablet 3       ALLERGIES     Allergies   Allergen Reactions     No Known Drug Allergies        PAST MEDICAL HISTORY:  Past Medical History:   Diagnosis Date     Aortic valve stenosis      Essential hypertension, benign      Fracture, tibia, shaft      History of colposcopy with cervical biopsy 2/22/11    AMRITA I     Papanicolaou smear of vagina with atypical squamous cells cannot exclude high grade squamous intraepithelial lesion (ASC-H) 1/31/11       PAST SURGICAL HISTORY:  Past Surgical History:   Procedure Laterality Date     C APPENDECTOMY       CV CORONARY ANGIOGRAM N/A 3/11/2019    Procedure: Coronary Angiogram;  Surgeon: Adolph Dave MD;  Location: [facility] CARDIAC CATH LAB     CV PCI ANGIOPLASTY N/A 3/11/2019    Procedure: Percutaneous Coronary Intervention;  Surgeon: Adolph Dave MD;  Location: [facility] CARDIAC CATH LAB       FAMILY HISTORY:  Family History   Problem Relation Age of Onset     C.A.D. Mother         early 60's     Lipids Mother      Heart Disease Mother 68        heart bypass surgery     Hypertension Mother      Thyroid Disease Mother      Heart Disease Father         valvular disease     Thyroid Disease Paternal Aunt      Heart Disease Maternal Grandmother         heart attack       SOCIAL HISTORY:  Social History     Socioeconomic History     Marital status:      Spouse name: None     Number of children: 2     Years of education: None     Highest education level: None   Occupational History     Employer: [employer]   Social Needs     Financial resource strain: None     Food insecurity     Worry: None     Inability: None     Transportation needs     Medical: None     Non-medical: None   Tobacco Use     Smoking status: Former Smoker     Smokeless tobacco: Never Used     Tobacco comment: quit 20 years ago   Substance and Sexual Activity     Alcohol use: Yes     Comment: wine every  "week     Drug use: No     Sexual activity: Yes     Birth control/protection: Condom   Lifestyle     Physical activity     Days per week: None     Minutes per session: None     Stress: None   Relationships     Social connections     Talks on phone: None     Gets together: None     Attends Adventist service: None     Active member of club or organization: None     Attends meetings of clubs or organizations: None     Relationship status: None     Intimate partner violence     Fear of current or ex partner: None     Emotionally abused: None     Physically abused: None     Forced sexual activity: None   Other Topics Concern      Service Not Asked     Blood Transfusions Not Asked     Caffeine Concern Not Asked     Occupational Exposure Not Asked     Hobby Hazards Not Asked     Sleep Concern Not Asked     Stress Concern Not Asked     Weight Concern Not Asked     Special Diet Not Asked     Back Care Not Asked     Exercise Yes     Bike Helmet Not Asked     Seat Belt Yes     Self-Exams Not Asked     Parent/sibling w/ CABG, MI or angioplasty before 65F 55M? Not Asked   Social History Narrative     None       Review of Systems:  Skin:  Negative       Eyes:  Positive for glasses    ENT:  Negative      Respiratory:  Negative   since stopping caffine has not any SOB (coffee)   Cardiovascular:  Negative      Gastroenterology: Negative      Genitourinary:  Positive for urinary frequency due nto water pill  Musculoskeletal:  Positive for joint pain hurt right arm at work  Neurologic:  Negative      Psychiatric:  Negative      Heme/Lymph/Imm:  Negative      Endocrine:  Negative        Physical Exam:  Vitals: /88 (BP Location: Right arm, Patient Position: Sitting, Cuff Size: Adult Large)   Pulse 75   Ht 1.727 m (5' 8\")   Wt 102.5 kg (226 lb)   LMP 07/07/2008   SpO2 100%   BMI 34.36 kg/m      Constitutional:  cooperative, alert and oriented, well developed, well nourished, in no acute distress obese      Skin:  warm " and dry to the touch, no apparent skin lesions or masses noted          Head:  normocephalic, no masses or lesions        Eyes:  pupils equal and round, conjunctivae and lids unremarkable, sclera white, no xanthalasma, EOMS intact, no nystagmus        Lymph:      ENT:  no pallor or cyanosis, dentition good        Neck:  carotid pulses are full and equal bilaterally;no carotid bruit        Respiratory:  normal breath sounds, clear to auscultation, normal A-P diameter, normal symmetry, normal respiratory excursion, no use of accessory muscles         Cardiac: regular rhythm       systolic murmur;radiation to the carotid;RUSB;grade 3     mid to late peaking  pulses full and equal                                        GI:    obese      Extremities and Muscular Skeletal:  no edema;no spinal abnormalities noted;normal muscle strength and tone              Neurological:  no gross motor deficits        Psych:  affect appropriate, oriented to time, person and place        CC  Sylvia Bird MD  [address] [facility] [address]  [place] [address]

## 2021-03-15 ENCOUNTER — DOCUMENTATION ONLY (OUTPATIENT)
Dept: CARDIOLOGY | Facility: CLINIC | Age: 66
End: 2021-03-15

## 2021-03-15 NOTE — PROGRESS NOTES
Received form from [facility] requesting PA for completed work 1/21/2021 (echo).  Faxed form to PA procedure team.  Copy sent to scan

## 2021-03-20 ENCOUNTER — HEALTH MAINTENANCE LETTER (OUTPATIENT)
Age: 66
End: 2021-03-20

## 2021-08-23 DIAGNOSIS — I35.0 NONRHEUMATIC AORTIC VALVE STENOSIS: ICD-10-CM

## 2021-08-23 RX ORDER — FUROSEMIDE 40 MG
40 TABLET ORAL DAILY
Qty: 90 TABLET | Refills: 2 | Status: SHIPPED | OUTPATIENT
Start: 2021-08-23 | End: 2022-05-26

## 2021-08-23 RX ORDER — LISINOPRIL 10 MG/1
10 TABLET ORAL DAILY
Qty: 90 TABLET | Refills: 2 | Status: SHIPPED | OUTPATIENT
Start: 2021-08-23 | End: 2022-05-26

## 2021-08-23 RX ORDER — POTASSIUM CHLORIDE 1500 MG/1
20 TABLET, EXTENDED RELEASE ORAL DAILY
Qty: 90 TABLET | Refills: 2 | Status: SHIPPED | OUTPATIENT
Start: 2021-08-23 | End: 2022-05-26

## 2021-09-04 ENCOUNTER — HEALTH MAINTENANCE LETTER (OUTPATIENT)
Age: 66
End: 2021-09-04

## 2021-09-07 ENCOUNTER — PRE VISIT (OUTPATIENT)
Dept: CARDIOLOGY | Facility: CLINIC | Age: 66
End: 2021-09-07

## 2021-09-08 ENCOUNTER — HOSPITAL ENCOUNTER (OUTPATIENT)
Dept: CARDIOLOGY | Facility: CLINIC | Age: 66
Discharge: HOME | End: 2021-09-08
Attending: INTERNAL MEDICINE | Admitting: INTERNAL MEDICINE
Payer: COMMERCIAL

## 2021-09-08 DIAGNOSIS — I25.10 CORONARY ARTERY DISEASE INVOLVING NATIVE CORONARY ARTERY OF NATIVE HEART WITHOUT ANGINA PECTORIS: ICD-10-CM

## 2021-09-08 DIAGNOSIS — I35.0 NONRHEUMATIC AORTIC VALVE STENOSIS: ICD-10-CM

## 2021-09-08 LAB — LVEF ECHO: NORMAL

## 2021-09-08 PROCEDURE — 93306 TTE W/DOPPLER COMPLETE: CPT | Mod: 26 | Performed by: INTERNAL MEDICINE

## 2021-09-08 PROCEDURE — 93306 TTE W/DOPPLER COMPLETE: CPT

## 2021-09-09 ENCOUNTER — LAB (OUTPATIENT)
Dept: LAB | Facility: CLINIC | Age: 66
End: 2021-09-09
Payer: COMMERCIAL

## 2021-09-09 DIAGNOSIS — I25.10 CORONARY ARTERY DISEASE INVOLVING NATIVE CORONARY ARTERY OF NATIVE HEART WITHOUT ANGINA PECTORIS: ICD-10-CM

## 2021-09-09 DIAGNOSIS — I35.0 NONRHEUMATIC AORTIC VALVE STENOSIS: ICD-10-CM

## 2021-09-09 LAB
ANION GAP SERPL CALCULATED.3IONS-SCNC: 4 MMOL/L (ref 3–14)
BUN SERPL-MCNC: 16 MG/DL (ref 7–30)
CALCIUM SERPL-MCNC: 9.2 MG/DL (ref 8.5–10.1)
CHLORIDE BLD-SCNC: 100 MMOL/L (ref 94–109)
CO2 SERPL-SCNC: 32 MMOL/L (ref 20–32)
CREAT SERPL-MCNC: 0.9 MG/DL (ref 0.52–1.04)
GFR SERPL CREATININE-BSD FRML MDRD: 68 ML/MIN/1.73M2
GLUCOSE BLD-MCNC: 89 MG/DL (ref 70–99)
POTASSIUM BLD-SCNC: 3.9 MMOL/L (ref 3.4–5.3)
SODIUM SERPL-SCNC: 136 MMOL/L (ref 133–144)

## 2021-09-09 PROCEDURE — 36415 COLL VENOUS BLD VENIPUNCTURE: CPT | Performed by: INTERNAL MEDICINE

## 2021-09-09 PROCEDURE — 80048 BASIC METABOLIC PNL TOTAL CA: CPT | Performed by: INTERNAL MEDICINE

## 2021-09-20 ENCOUNTER — OFFICE VISIT (OUTPATIENT)
Dept: CARDIOLOGY | Facility: CLINIC | Age: 66
End: 2021-09-20
Attending: INTERNAL MEDICINE
Payer: COMMERCIAL

## 2021-09-20 VITALS
WEIGHT: 219 LBS | HEART RATE: 72 BPM | BODY MASS INDEX: 33.19 KG/M2 | SYSTOLIC BLOOD PRESSURE: 110 MMHG | HEIGHT: 68 IN | DIASTOLIC BLOOD PRESSURE: 64 MMHG

## 2021-09-20 DIAGNOSIS — E66.09 CLASS 1 OBESITY DUE TO EXCESS CALORIES WITH SERIOUS COMORBIDITY AND BODY MASS INDEX (BMI) OF 33.0 TO 33.9 IN ADULT: ICD-10-CM

## 2021-09-20 DIAGNOSIS — E66.811 CLASS 1 OBESITY DUE TO EXCESS CALORIES WITH SERIOUS COMORBIDITY AND BODY MASS INDEX (BMI) OF 33.0 TO 33.9 IN ADULT: ICD-10-CM

## 2021-09-20 DIAGNOSIS — E78.00 HYPERCHOLESTEREMIA: Primary | ICD-10-CM

## 2021-09-20 DIAGNOSIS — I35.0 NONRHEUMATIC AORTIC VALVE STENOSIS: ICD-10-CM

## 2021-09-20 DIAGNOSIS — R06.09 DOE (DYSPNEA ON EXERTION): ICD-10-CM

## 2021-09-20 DIAGNOSIS — I10 ESSENTIAL HYPERTENSION, BENIGN: ICD-10-CM

## 2021-09-20 DIAGNOSIS — I25.10 CORONARY ARTERY DISEASE INVOLVING NATIVE CORONARY ARTERY OF NATIVE HEART WITHOUT ANGINA PECTORIS: ICD-10-CM

## 2021-09-20 PROCEDURE — 99214 OFFICE O/P EST MOD 30 MIN: CPT | Performed by: INTERNAL MEDICINE

## 2021-09-20 ASSESSMENT — MIFFLIN-ST. JEOR: SCORE: 1591.88

## 2021-09-20 NOTE — PROGRESS NOTES
Service Date: 09/20/2021    Heather is a very sweet 66-year-old woman with a past medical history significant for coronary artery disease, valvular heart disease, former smoker, having quit in 1999, obesity, hypertension.    She has a very strong family history of coronary artery disease and valvular disease.  Her mother was a smoker, had coronary artery bypass grafting x3 in her 60s.  Father had valve surgery.  She does not know the details.    I met Heather in 02/2019 with a chief complaint of dyspnea on exertion.  Echo demonstrated an ejection fraction of 60% with moderate-to-severe aortic stenosis with a mean gradient of 33, valve area of 0.9 and a dimensionless index of 27.  Stroke volume index was 39.  I set her up for a stress echo, which was positive, leading to stenting of her left anterior descending artery.  She was seen in the [facility].  It was felt the valve was more in the moderate range and recommended continued medical management.    Heather returns to clinic stating that she continues to do well.  She only gets shortness of breath if she hurries up 2 flights of stairs, but day to day living causes no problems.  As a matter of fact, she and her  just got back from [facility].  She states they walked all over the place without any problems.  She states she does not eat a particularly good diet and is not very consistent about her walking.  She is evasive in elaborating on either one.    She has no chest, arm, neck, jaw or shoulder discomfort.  No dyspnea on exertion, orthopnea or PND other than that mentioned above.  She has no lightheadedness, dizziness, syncope or near syncope.  She notes no side effects with current medical regimen.    ASSESSMENT AND PLAN:  Heather appears to be doing well from a cardiac standpoint without symptoms to suggest ischemia.  She does not appear to have any heart failure or significant arrhythmias.    Repeat echocardiogram is not significantly changed.  Ejection  fraction is 55%. Aortic valve area is 0.86 with a mean gradient of 35.  Dimensionless index is 27.    Blood pressure is very well controlled today at 110/64 with a pulse of 70.    Her weight is 219 and she has steadily lost weight over the last 2 years and is now down about 22 pounds.  She states she does not think she is doing anything specific to lose the weight.    Fasting lipid profile is outstanding.  Total cholesterol is 135, HDL 75, LDL is 48, triglycerides of 59 on atorvastatin 40.    Basic metabolic profile shows a creatinine of 0.9 and normal electrolytes.    We reviewed her medications.  We will continue them all as is.  I will have her follow up with my ELIZABETH in 6 months with a repeat echo. I will see her back in a year.  If she should develop symptoms or problems, we would be glad to see her sooner.  We did talk about TAVR as an alternative to open heart surgery.  She asked about the longevity of TAVR. Thank you for allowing me to participate in her care.    30 minutes was spent today in reviewing her studies and discussing her questions.    Adolph Dave MD, [facility]        D: 2021   T: 2021   MT: alesia    Name:     ABBY ASHTON  MRN:      [id number]        Account:      [id number]   :      1955           Service Date: 2021       Document: [id number]

## 2021-09-20 NOTE — LETTER
"9/20/2021    Sylvia Bird MD  [address]  [facility] [address]    RE: Heather Bauer       Dear Colleague,    I had the pleasure of seeing Heather Bauer in the [facility].    HPI and Plan:   See dictation    No orders of the defined types were placed in this encounter.      No orders of the defined types were placed in this encounter.      There are no discontinued medications.      Encounter Diagnoses   Name Primary?     Nonrheumatic aortic valve stenosis      Coronary artery disease involving native coronary artery of native heart without angina pectoris      Hypercholesteremia Yes     Essential hypertension, benign      BORJA (dyspnea on exertion)      Class 1 obesity due to excess calories with serious comorbidity and body mass index (BMI) of 33.0 to 33.9 in adult        CURRENT MEDICATIONS:  Current Outpatient Medications   Medication Sig Dispense Refill     aspirin 81 MG EC tablet Take 81 mg by mouth daily       atorvastatin (LIPITOR) 40 MG tablet Take 1 tablet (40 mg) by mouth daily 90 tablet 3     furosemide (LASIX) 40 MG tablet Take 1 tablet (40 mg) by mouth daily 90 tablet 2     lisinopril (ZESTRIL) 10 MG tablet Take 1 tablet (10 mg) by mouth daily 90 tablet 2     metoprolol succinate ER (TOPROL XL) 25 MG 24 hr tablet Take 0.5 tablets (12.5 mg) by mouth daily 45 tablet 3     MULTIPLE MINERALS-VITAMINS OR TABS        nitroGLYcerin (NITROSTAT) 0.4 MG sublingual tablet One tablet under the tongue every 5 minutes if needed for chest pain. May repeat every 5 minutes for a maximum of 3 doses in 15 minutes\" 25 tablet 3     potassium chloride ER (KLOR-CON M) 20 MEQ CR tablet Take 1 tablet (20 mEq) by mouth daily 90 tablet 2       ALLERGIES     Allergies   Allergen Reactions     No Known Drug Allergies        PAST MEDICAL HISTORY:  Past Medical History:   Diagnosis Date     Aortic valve stenosis      Essential hypertension, benign      " Fracture, tibia, shaft      History of colposcopy with cervical biopsy 2/22/11    AMRITA I     Papanicolaou smear of vagina with atypical squamous cells cannot exclude high grade squamous intraepithelial lesion (ASC-H) 1/31/11       PAST SURGICAL HISTORY:  Past Surgical History:   Procedure Laterality Date     C APPENDECTOMY       CV CORONARY ANGIOGRAM N/A 3/11/2019    Procedure: Coronary Angiogram;  Surgeon: Adolph Dave MD;  Location:  HEART CARDIAC CATH LAB     CV PCI ANGIOPLASTY N/A 3/11/2019    Procedure: Percutaneous Coronary Intervention;  Surgeon: Adolph Dave MD;  Location:  HEART CARDIAC CATH LAB       FAMILY HISTORY:  Family History   Problem Relation Age of Onset     C.A.D. Mother         early 60's     Lipids Mother      Heart Disease Mother 68        heart bypass surgery     Hypertension Mother      Thyroid Disease Mother      Heart Disease Father         valvular disease     Thyroid Disease Paternal Aunt      Heart Disease Maternal Grandmother         heart attack       SOCIAL HISTORY:  Social History     Socioeconomic History     Marital status:      Spouse name: None     Number of children: 2     Years of education: None     Highest education level: None   Occupational History     Employer: [employer]   Tobacco Use     Smoking status: Former Smoker     Smokeless tobacco: Never Used     Tobacco comment: quit 20 years ago   Substance and Sexual Activity     Alcohol use: Yes     Comment: wine every week     Drug use: No     Sexual activity: Yes     Birth control/protection: Condom   Other Topics Concern      Service Not Asked     Blood Transfusions Not Asked     Caffeine Concern Not Asked     Occupational Exposure Not Asked     Hobby Hazards Not Asked     Sleep Concern Not Asked     Stress Concern Not Asked     Weight Concern Not Asked     Special Diet Not Asked     Back Care Not Asked     Exercise Yes     Bike Helmet Not Asked     Seat Belt Yes     Self-Exams Not Asked  "    Parent/sibling w/ CABG, MI or angioplasty before 65F 55M? Not Asked   Social History Narrative     None     Social Determinants of Health     Financial Resource Strain:      Difficulty of Paying Living Expenses:    Food Insecurity:      Worried About Running Out of Food in the Last Year:      Ran Out of Food in the Last Year:    Transportation Needs:      Lack of Transportation (Medical):      Lack of Transportation (Non-Medical):    Physical Activity:      Days of Exercise per Week:      Minutes of Exercise per Session:    Stress:      Feeling of Stress :    Social Connections:      Frequency of Communication with Friends and Family:      Frequency of Social Gatherings with Friends and Family:      Attends Taoism Services:      Active Member of Clubs or Organizations:      Attends Club or Organization Meetings:      Marital Status:    Intimate Partner Violence:      Fear of Current or Ex-Partner:      Emotionally Abused:      Physically Abused:      Sexually Abused:        Review of Systems:  Skin:  Negative       Eyes:  Negative      ENT:  Negative      Respiratory:  Positive for dyspnea on exertion     Cardiovascular:  Negative      Gastroenterology: Negative      Genitourinary:  not assessed      Musculoskeletal:  Negative      Neurologic:  Positive for numbness or tingling of hands    Psychiatric:  Negative      Heme/Lymph/Imm:  Negative      Endocrine:  Negative        Physical Exam:  Vitals: /64   Pulse 72   Ht 1.727 m (5' 8\")   Wt 99.3 kg (219 lb)   LMP 07/07/2008   BMI 33.30 kg/m      Constitutional:  cooperative, alert and oriented, well developed, well nourished, in no acute distress obese      Skin:  warm and dry to the touch, no apparent skin lesions or masses noted          Head:  normocephalic, no masses or lesions        Eyes:  pupils equal and round, conjunctivae and lids unremarkable, sclera white, no xanthalasma, EOMS intact, no nystagmus        Lymph:      ENT:  no pallor or " cyanosis, dentition good        Neck:  carotid pulses are full and equal bilaterally;no carotid bruit        Respiratory:  normal breath sounds, clear to auscultation, normal A-P diameter, normal symmetry, normal respiratory excursion, no use of accessory muscles         Cardiac: regular rhythm       systolic murmur;radiation to the carotid;RUSB;grade 3     mid to late peaking  pulses full and equal                                        GI:    obese      Extremities and Muscular Skeletal:  no edema;no spinal abnormalities noted;normal muscle strength and tone              Neurological:  no gross motor deficits        Psych:  affect appropriate, oriented to time, person and place        CC  Adolph Dave MD  [address]  [place] [address]                  Thank you for allowing me to participate in the care of your patient.      Sincerely,     Adolph Dave MD     [facility]  cc:   Adolph Dave MD  [address]  [place]  [place] [address]

## 2021-09-20 NOTE — PROGRESS NOTES
"HPI and Plan:   See dictation    No orders of the defined types were placed in this encounter.      No orders of the defined types were placed in this encounter.      There are no discontinued medications.      Encounter Diagnoses   Name Primary?     Nonrheumatic aortic valve stenosis      Coronary artery disease involving native coronary artery of native heart without angina pectoris      Hypercholesteremia Yes     Essential hypertension, benign      BORJA (dyspnea on exertion)      Class 1 obesity due to excess calories with serious comorbidity and body mass index (BMI) of 33.0 to 33.9 in adult        CURRENT MEDICATIONS:  Current Outpatient Medications   Medication Sig Dispense Refill     aspirin 81 MG EC tablet Take 81 mg by mouth daily       atorvastatin (LIPITOR) 40 MG tablet Take 1 tablet (40 mg) by mouth daily 90 tablet 3     furosemide (LASIX) 40 MG tablet Take 1 tablet (40 mg) by mouth daily 90 tablet 2     lisinopril (ZESTRIL) 10 MG tablet Take 1 tablet (10 mg) by mouth daily 90 tablet 2     metoprolol succinate ER (TOPROL XL) 25 MG 24 hr tablet Take 0.5 tablets (12.5 mg) by mouth daily 45 tablet 3     MULTIPLE MINERALS-VITAMINS OR TABS        nitroGLYcerin (NITROSTAT) 0.4 MG sublingual tablet One tablet under the tongue every 5 minutes if needed for chest pain. May repeat every 5 minutes for a maximum of 3 doses in 15 minutes\" 25 tablet 3     potassium chloride ER (KLOR-CON M) 20 MEQ CR tablet Take 1 tablet (20 mEq) by mouth daily 90 tablet 2       ALLERGIES     Allergies   Allergen Reactions     No Known Drug Allergies        PAST MEDICAL HISTORY:  Past Medical History:   Diagnosis Date     Aortic valve stenosis      Essential hypertension, benign      Fracture, tibia, shaft      History of colposcopy with cervical biopsy 2/22/11    AMRITA I     Papanicolaou smear of vagina with atypical squamous cells cannot exclude high grade squamous intraepithelial lesion (ASC-H) 1/31/11       PAST SURGICAL " HISTORY:  Past Surgical History:   Procedure Laterality Date     C APPENDECTOMY       CV CORONARY ANGIOGRAM N/A 3/11/2019    Procedure: Coronary Angiogram;  Surgeon: Adolph Dave MD;  Location:  HEART CARDI[facility] CATH LAB     CV PCI ANGIOPLASTY N/A 3/11/2019    Procedure: Percutaneous Coronary Intervention;  Surgeon: Adolph Dave MD;  Location:  HEART CARDIAC CATH LAB       FAMILY HISTORY:  Family History   Problem Relation Age of Onset     C.A.D. Mother         early 60's     Lipids Mother      Heart Disease Mother 68        heart bypass surgery     Hypertension Mother      Thyroid Disease Mother      Heart Disease Father         valvular disease     Thyroid Disease Paternal Aunt      Heart Disease Maternal Grandmother         heart attack       SOCIAL HISTORY:  Social History     Socioeconomic History     Marital status:      Spouse name: None     Number of children: 2     Years of education: None     Highest education level: None   Occupational History     Employer: [employer]   Tobacco Use     Smoking status: Former Smoker     Smokeless tobacco: Never Used     Tobacco comment: quit 20 years ago   Substance and Sexual Activity     Alcohol use: Yes     Comment: wine every week     Drug use: No     Sexual activity: Yes     Birth control/protection: Condom   Other Topics Concern      Service Not Asked     Blood Transfusions Not Asked     Caffeine Concern Not Asked     Occupational Exposure Not Asked     Hobby Hazards Not Asked     Sleep Concern Not Asked     Stress Concern Not Asked     Weight Concern Not Asked     Special Diet Not Asked     Back Care Not Asked     Exercise Yes     Bike Helmet Not Asked     Seat Belt Yes     Self-Exams Not Asked     Parent/sibling w/ CABG, MI or angioplasty before 65F 55M? Not Asked   Social History Narrative     None     Social Determinants of Health     Financial Resource Strain:      Difficulty of Paying Living Expenses:    Food Insecurity:       "Worried About Running Out of Food in the Last Year:      Ran Out of Food in the Last Year:    Transportation Needs:      Lack of Transportation (Medical):      Lack of Transportation (Non-Medical):    Physical Activity:      Days of Exercise per Week:      Minutes of Exercise per Session:    Stress:      Feeling of Stress :    Social Connections:      Frequency of Communication with Friends and Family:      Frequency of Social Gatherings with Friends and Family:      Attends [employer] Services:      Active Member of Clubs or Organizations:      Attends Club or Organization Meetings:      Marital Status:    Intimate Partner Violence:      Fear of Current or Ex-Partner:      Emotionally Abused:      Physically Abused:      Sexually Abused:        Review of Systems:  Skin:  Negative       Eyes:  Negative      ENT:  Negative      Respiratory:  Positive for dyspnea on exertion     Cardiovascular:  Negative      Gastroenterology: Negative      Genitourinary:  not assessed      Musculoskeletal:  Negative      Neurologic:  Positive for numbness or tingling of hands    Psychiatric:  Negative      Heme/Lymph/Imm:  Negative      Endocrine:  Negative        Physical Exam:  Vitals: /64   Pulse 72   Ht 1.727 m (5' 8\")   Wt 99.3 kg (219 lb)   LMP 07/07/2008   BMI 33.30 kg/m      Constitutional:  cooperative, alert and oriented, well developed, well nourished, in no acute distress obese      Skin:  warm and dry to the touch, no apparent skin lesions or masses noted          Head:  normocephalic, no masses or lesions        Eyes:  pupils equal and round, conjunctivae and lids unremarkable, sclera white, no xanthalasma, EOMS intact, no nystagmus        Lymph:      ENT:  no pallor or cyanosis, dentition good        Neck:  carotid pulses are full and equal bilaterally;no carotid bruit        Respiratory:  normal breath sounds, clear to auscultation, normal A-P diameter, normal symmetry, normal respiratory excursion, no use " of accessory muscles         Cardiac: regular rhythm       systolic murmur;radiation to the carotid;RUSB;grade 3     mid to late peaking  pulses full and equal                                        GI:    obese      Extremities and Muscular Skeletal:  no edema;no spinal abnormalities noted;normal muscle strength and tone              Neurological:  no gross motor deficits        Psych:  affect appropriate, oriented to time, person and place        CC  Adolph Dave MD  [address]  [place] [address]

## 2021-12-17 ENCOUNTER — OFFICE VISIT (OUTPATIENT)
Dept: URGENT CARE | Facility: URGENT CARE | Age: 66
End: 2021-12-17
Payer: COMMERCIAL

## 2021-12-17 VITALS — OXYGEN SATURATION: 100 % | HEART RATE: 72 BPM | WEIGHT: 220 LBS | BODY MASS INDEX: 33.45 KG/M2 | TEMPERATURE: 97 F

## 2021-12-17 DIAGNOSIS — R20.2 PARESTHESIA: Primary | ICD-10-CM

## 2021-12-17 DIAGNOSIS — S76.911A MUSCLE STRAIN OF THIGH, RIGHT, INITIAL ENCOUNTER: ICD-10-CM

## 2021-12-17 PROCEDURE — 99213 OFFICE O/P EST LOW 20 MIN: CPT | Performed by: FAMILY MEDICINE

## 2021-12-18 NOTE — PROGRESS NOTES
ASSESSMENT/ PLAN:  Paresthesia  Has right thigh transient aching, weakness when sudden standing.  Does not have the symptoms now.  The symptoms resolve over about 5 minutes with movement and walking      Muscle strain of thigh, right, initial encounter     We discussed the possible causes of the patient's musculoskeletal pain including compression of nerve or blood vessel with sitting, that then becomes gradually resolved with standing and moving  ,    No history of contusion of of muscle and other soft tissues, no Muscle strain, On exam of the right knee no noted Sprain of ligaments and/ or joint capsule, No pain due to damage to articular cartilage/ meniscal structures, no  Inflammation of a bursa,      She had no significant trauma, as cause of her symptoms- no x-ray needed  No calf tenderness, no cords, no popliteal tenderness, negative Heath's sign-  Location and symptoms do not fit DVT   acetaminophen and/or ibuprofen for pain  Warm packs to the site of pain       --------------------------------------------------------------------------------------------    SUBJECTIVE:  Chief Complaint   Patient presents with     Urgent Care     Trauma     right knee/ behind the knee pain, aching/ painful      Heather Bauer is a 66 year old female who presents with a chief complaint of right lateral distal thigh with intermittent pain and tenderness.  Especially a problem with starting to stand, with feeling like muscle is not stable, then in a few minutes the discomfort disappears..    Symptoms began 2 week(s) ago, are moderate and intermittent episodes lasting  5-10 minutes with standing ,  Also some at rest.   Context:- she is concerned about DVT  Injury:No.     Pain exacerbated by standing Relieved by rest.  She treated it initially with no therapy. This is the first time this type of injury has occurred to this patient.     Past Medical History:   Diagnosis Date     Aortic valve stenosis      Essential hypertension,  "benign      Fracture, tibia, shaft      History of colposcopy with cervical biopsy 2/22/11    AMRITA I     Papanicolaou smear of vagina with atypical squamous cells cannot exclude high grade squamous intraepithelial lesion (ASC-H) 1/31/11     Patient Active Problem List   Diagnosis     Essential hypertension, benign     HCD (health care directive)     CARDIOVASCULAR SCREENING; LDL GOAL LESS THAN 130     Class 1 obesity due to excess calories with serious comorbidity and body mass index (BMI) of 33.0 to 33.9 in adult     BORJA (dyspnea on exertion)     Nonrheumatic aortic valve stenosis     Abnormal stress test     Coronary artery disease involving native coronary artery of native heart without angina pectoris     Hypercholesteremia       ALLERGIES:  No known drug allergies    MEDs  aspirin 81 MG EC tablet, Take 81 mg by mouth daily  atorvastatin (LIPITOR) 40 MG tablet, Take 1 tablet (40 mg) by mouth daily  furosemide (LASIX) 40 MG tablet, Take 1 tablet (40 mg) by mouth daily  lisinopril (ZESTRIL) 10 MG tablet, Take 1 tablet (10 mg) by mouth daily  metoprolol succinate ER (TOPROL XL) 25 MG 24 hr tablet, Take 0.5 tablets (12.5 mg) by mouth daily  MULTIPLE MINERALS-VITAMINS OR TABS,   nitroGLYcerin (NITROSTAT) 0.4 MG sublingual tablet, One tablet under the tongue every 5 minutes if needed for chest pain. May repeat every 5 minutes for a maximum of 3 doses in 15 minutes\"  potassium chloride ER (KLOR-CON M) 20 MEQ CR tablet, Take 1 tablet (20 mEq) by mouth daily    No current facility-administered medications on file prior to visit.      Social History     Tobacco Use     Smoking status: Former Smoker     Smokeless tobacco: Never Used     Tobacco comment: quit 20 years ago   Substance Use Topics     Alcohol use: Yes     Comment: wine every week       Family History   Problem Relation Age of Onset     C.A.D. Mother         early 60's     Lipids Mother      Heart Disease Mother 68        heart bypass surgery     Hypertension " Mother      Thyroid Disease Mother      Heart Disease Father         valvular disease     Thyroid Disease Paternal Aunt      Heart Disease Maternal Grandmother         heart attack       ROS:  CONSTITUTIONAL:NEGATIVE for fever, chills,    INTEGUMENTARY/SKIN: NEGATIVE for worrisome rashes,  or lesions  EYES: NEGATIVE for vision changes or irritation  ENT/MOUTH: NEGATIVE for ear, mouth and throat problems  RESP:NEGATIVE for significant cough or SOB  GI: NEGATIVE for nausea, abdominal pain or change in bowel habits    EXAM:   Pulse 72   Temp 97  F (36.1  C) (Tympanic)   Wt 99.8 kg (220 lb)   LMP 07/07/2008   SpO2 100%   BMI 33.45 kg/m    M/S Exam:right thigh  At present no erythema, swelling, tenderness to palpation or decreased ROM , no pain with palpation popliteal area  No pain with palpation of right thigh muscles or right lower leg.  No palpable cords. Negative Heath's sign  No swelling or discoloration right leg    Right knee-  No pain with ROM of the patella, medial or lateral collateral ligament stress,.  Joint is stable ,  No laxity to ACL/ PCL.  No pain with compression of the meniscus  No pain with palpation of posterior knee    Able to walk on toes/ heels, tandem walk without difficulty     EYES:   EOMI,   conjunctiva clear  HENT:   External ears with no swelling or lesions   Nose and lips without  Swelling, ulcers, erythema or lesions  NECK:   normal pain free ROM  RESP:   no labored respirations, no tachypnea  EXTREMITIES:   Full ROM without expression of pain or limitation x 3 remaining extremities  NEURO:   Normal strength and tone, ambulation without difficulty,   normal speech and mentation  SKIN:  no suspicious lesions or rashes

## 2021-12-20 ENCOUNTER — OFFICE VISIT (OUTPATIENT)
Dept: INTERNAL MEDICINE | Facility: CLINIC | Age: 66
End: 2021-12-20
Payer: COMMERCIAL

## 2021-12-20 ENCOUNTER — ANCILLARY PROCEDURE (OUTPATIENT)
Dept: GENERAL RADIOLOGY | Facility: CLINIC | Age: 66
End: 2021-12-20
Attending: NURSE PRACTITIONER
Payer: COMMERCIAL

## 2021-12-20 VITALS
TEMPERATURE: 97.8 F | BODY MASS INDEX: 33.33 KG/M2 | HEART RATE: 99 BPM | WEIGHT: 219.9 LBS | SYSTOLIC BLOOD PRESSURE: 98 MMHG | RESPIRATION RATE: 16 BRPM | HEIGHT: 68 IN | DIASTOLIC BLOOD PRESSURE: 69 MMHG | OXYGEN SATURATION: 95 %

## 2021-12-20 DIAGNOSIS — M25.561 PAIN AND SWELLING OF RIGHT KNEE: ICD-10-CM

## 2021-12-20 DIAGNOSIS — M25.461 PAIN AND SWELLING OF RIGHT KNEE: ICD-10-CM

## 2021-12-20 DIAGNOSIS — M25.561 PAIN AND SWELLING OF RIGHT KNEE: Primary | ICD-10-CM

## 2021-12-20 DIAGNOSIS — M25.461 PAIN AND SWELLING OF RIGHT KNEE: Primary | ICD-10-CM

## 2021-12-20 PROCEDURE — 73560 X-RAY EXAM OF KNEE 1 OR 2: CPT | Mod: RT | Performed by: RADIOLOGY

## 2021-12-20 PROCEDURE — 99214 OFFICE O/P EST MOD 30 MIN: CPT | Performed by: NURSE PRACTITIONER

## 2021-12-20 RX ORDER — NAPROXEN 500 MG/1
500 TABLET ORAL 2 TIMES DAILY PRN
Qty: 30 TABLET | Refills: 1 | Status: SHIPPED | OUTPATIENT
Start: 2021-12-20 | End: 2022-05-12

## 2021-12-20 ASSESSMENT — MIFFLIN-ST. JEOR: SCORE: 1595.96

## 2021-12-20 NOTE — PATIENT INSTRUCTIONS
Go to X-ray after appointment.    Physical therapy will call you to schedule.     Take one tablet of Naproxen twice a day for one week then as need for knee pain and swelling.

## 2021-12-20 NOTE — PROGRESS NOTES
"  Assessment & Plan     Pain and swelling of right knee  Patient seen in  for R knee pain and swelling last Friday. No US done as DVT is not suspected based on history. Presents to clinic with same pain and symptoms as before. Will get XR and refer to PT for treatment. Also will send Naproxen for pain.  - XR Knee Standing Right 2 Views; Future  - Physical Therapy Referral; Future  - naproxen (NAPROSYN) 500 MG tablet; Take 1 tablet (500 mg) by mouth 2 times daily as needed for moderate pain (For pain)    6}     BMI:   Estimated body mass index is 33.44 kg/m  as calculated from the following:    Height as of this encounter: 1.727 m (5' 8\").    Weight as of this encounter: 99.7 kg (219 lb 14.4 oz).       Patient Instructions   Go to X-ray after appointment.    Physical therapy will call you to schedule.     Take one tablet of Naproxen twice a day for one week then as need for knee pain and swelling.      Return in about 4 weeks (around 1/17/2022), or if symptoms worsen or fail to improve.    DALIA Hernandez    Review of history, counseling, and additional work unit(s): time factor: 25 minutes. + agree with above documentation.    [facility] WILBERT Romero is a 66 year old who presents for the following health issues  accompanied by her self.    [facility]     ED/ Followup:    Facility:  [facility]  Date of visit: 12/17/21  Reason for visit: Right leg pain  Current Status: Still not improving     Patient presents with 2 weeks of lateral knee pain that gets worse with walking, especially up stairs. When she sits for long periods and stands up it feels week. She has not used any medication to treat this, and heat has been minimally effective. She denies trauma.    Briefly reviewed notes and PMH.  No imaging done as did not suspect DVT at  visit.  Continues to have pain along outside of knee. Occasional popping sound with stairs and walking. No " "prior imaging.    Review of Systems   Constitutional, HEENT, cardiovascular, pulmonary, gi and gu systems are negative, except as otherwise noted.      Objective    BP 98/69 (BP Location: Right arm, Patient Position: Sitting, Cuff Size: Adult Large)   Pulse 99   Temp 97.8  F (36.6  C) (Tympanic)   Resp 16   Ht 1.727 m (5' 8\")   Wt 99.7 kg (219 lb 14.4 oz)   LMP 07/07/2008   SpO2 95%   BMI 33.44 kg/m    Body mass index is 33.44 kg/m .     Physical Exam   GENERAL: alert and no distress  EYES: Eyes grossly normal to inspection  HENT: ear canals and TM's normal, nose and mouth without ulcers or lesions  NECK: no adenopathy, no asymmetry, masses, or scars and thyroid normal to palpation  RESP: lungs clear to auscultation - no rales, rhonchi or wheezes  CV: regular rate and rhythm, high pitched 3+ sound heard more on R side then L, no click or rub, no peripheral edema and peripheral pulses strong  ABDOMEN: soft, nontender, no hepatosplenomegaly, no masses and bowel sounds normal  MS: no gross musculoskeletal defects noted, no edema, no laxity in R or L knee, no point tenderness, valgus and varus stess test negative, anterior and posterior drawer test negative, no effusion noted.   SKIN: no suspicious lesions or rashes  NEURO: Normal strength and tone, mentation intact and speech normal  PSYCH: mentation appears normal, affect normal/bright              "

## 2022-01-12 ENCOUNTER — THERAPY VISIT (OUTPATIENT)
Dept: PHYSICAL THERAPY | Facility: CLINIC | Age: 67
End: 2022-01-12
Attending: NURSE PRACTITIONER
Payer: COMMERCIAL

## 2022-01-12 DIAGNOSIS — M25.461 PAIN AND SWELLING OF RIGHT KNEE: ICD-10-CM

## 2022-01-12 DIAGNOSIS — M17.11 PRIMARY OSTEOARTHRITIS OF RIGHT KNEE: ICD-10-CM

## 2022-01-12 DIAGNOSIS — M25.561 PAIN AND SWELLING OF RIGHT KNEE: ICD-10-CM

## 2022-01-12 PROCEDURE — 97161 PT EVAL LOW COMPLEX 20 MIN: CPT | Mod: GP | Performed by: PHYSICAL THERAPIST

## 2022-01-12 PROCEDURE — 97010 HOT OR COLD PACKS THERAPY: CPT | Mod: GP | Performed by: PHYSICAL THERAPIST

## 2022-01-12 PROCEDURE — 97110 THERAPEUTIC EXERCISES: CPT | Mod: GP | Performed by: PHYSICAL THERAPIST

## 2022-01-12 ASSESSMENT — ACTIVITIES OF DAILY LIVING (ADL)
LIMPING: THE SYMPTOM AFFECTS MY ACTIVITY SLIGHTLY
SIT WITH YOUR KNEE BENT: NOT ANSWERED
PAIN: THE SYMPTOM AFFECTS MY ACTIVITY SLIGHTLY
KNEEL ON THE FRONT OF YOUR KNEE: NOT ANSWERED
RISE FROM A CHAIR: NOT ANSWERED
SWELLING: I DO NOT HAVE THE SYMPTOM
KNEE_ACTIVITY_OF_DAILY_LIVING_SUM: 23
AS_A_RESULT_OF_YOUR_KNEE_INJURY,_HOW_WOULD_YOU_RATE_YOUR_CURRENT_LEVEL_OF_DAILY_ACTIVITY?: NOT ANSWERED
GIVING WAY, BUCKLING OR SHIFTING OF KNEE: I DO NOT HAVE THE SYMPTOM
GO UP STAIRS: NOT ANSWERED
GO DOWN STAIRS: NOT ANSWERED
STAND: NOT ANSWERED
STIFFNESS: I HAVE THE SYMPTOM BUT IT DOES NOT AFFECT MY ACTIVITY
SQUAT: NOT ANSWERED
HOW_WOULD_YOU_RATE_THE_OVERALL_FUNCTION_OF_YOUR_KNEE_DURING_YOUR_USUAL_DAILY_ACTIVITIES?: NOT ANSWERED
WEAKNESS: I HAVE THE SYMPTOM BUT IT DOES NOT AFFECT MY ACTIVITY
WALK: NOT ANSWERED

## 2022-01-12 NOTE — PROGRESS NOTES
Physical Therapy Initial Evaluation  Subjective:  Pt reports the insidious onset of right knee pain approximately 6 months ago.  Locates the pain in the lateral > medial knee with pain behind the knee as well.  Painful with stairs, walking, sitting with knee bent.  Recent x-rays show moderate OA.  The knee pain has reduced recently.        The history is provided by the patient.   Therapist Generated HPI Evaluation         Type of problem:  Right knee.    This is a new condition.  Condition occurred with:  Insidious onset and degenerative joint disease.    Patient reports pain:  Lateral, medial and posterior.  Pain is described as aching and stabbing and is intermittent.  Pain is the same all the time.  Since onset symptoms are gradually improving.  Associated symptoms:  Loss of strength, loss of motion/stiffness and edema. Symptoms are exacerbated by bending/squatting, kneeling, sitting, walking, standing, ascending stairs and descending stairs  and relieved by rest.  Special tests included:  X-ray (OA).    Restrictions due to condition include:  Working in normal job without restrictions.  Barriers include:  None as reported by patient.    Patient Health History             Pertinent medical history includes: heart problems, high blood pressure, osteoarthritis and overweight.            Current medications:  High blood pressure medication and cardiac medication.    Current occupation is  receipts.   Primary job tasks include:  Computer work, prolonged sitting and repetitive tasks.                                    Objective:  System                                                Knee Evaluation:  ROM:  Strength:  Normal (Mild weakness of hip abduction)    PROM    Hyperextension: Left: 0    Right:  0  Extension: Left: 3    Right:  5  Flexion: Left: 115    Right:  101        Ligament Testing:  Normal                  Palpation:      Right knee tenderness present at:  Lateral Joint Line  Right knee tenderness  not present at:  Medial Joint Line  Edema:  Edema of the knee: Moderate edema lateral knee, lat infrapatellar fat pad.            General     ROS    Assessment/Plan:    Patient is a 66 year old female with right side knee complaints.    Patient has the following significant findings with corresponding treatment plan.                Diagnosis 1:  Right knee OA  Pain -  hot/cold therapy and education  Decreased ROM/flexibility - manual therapy, therapeutic exercise and home program  Decreased strength - therapeutic exercise, therapeutic activities and home program    Therapy Evaluation Codes:   1) History comprised of:   Personal factors that impact the plan of care:      None.    Comorbidity factors that impact the plan of care are:      None.     Medications impacting care: None.  2) Examination of Body Systems comprised of:   Body structures and functions that impact the plan of care:      Knee.   Activity limitations that impact the plan of care are:      Sitting, Squatting/kneeling, Stairs, Standing and Walking.  3) Clinical presentation characteristics are:   Stable/Uncomplicated.  4) Decision-Making    Low complexity using standardized patient assessment instrument and/or measureable assessment of functional outcome.  Cumulative Therapy Evaluation is: Low complexity.    Previous and current functional limitations:  (See Goal Flow Sheet for this information)    Short term and Long term goals: (See Goal Flow Sheet for this information)     Communication ability:  Patient appears to be able to clearly communicate and understand verbal and written communication and follow directions correctly.  Treatment Explanation - The following has been discussed with the patient:   RX ordered/plan of care  Anticipated outcomes  Possible risks and side effects  This patient would benefit from PT intervention to resume normal activities.   Rehab potential is good.    Frequency:  1 X week, once daily  Duration:  for 4  weeks  Discharge Plan:  Achieve all LTG.  Independent in home treatment program.  Reach maximal therapeutic benefit.    Please refer to the daily flowsheet for treatment today, total treatment time and time spent performing 1:1 timed codes.

## 2022-01-19 ENCOUNTER — THERAPY VISIT (OUTPATIENT)
Dept: PHYSICAL THERAPY | Facility: CLINIC | Age: 67
End: 2022-01-19
Payer: COMMERCIAL

## 2022-01-19 DIAGNOSIS — M17.11 PRIMARY OSTEOARTHRITIS OF RIGHT KNEE: ICD-10-CM

## 2022-01-19 PROCEDURE — 97010 HOT OR COLD PACKS THERAPY: CPT | Mod: GP | Performed by: PHYSICAL THERAPIST

## 2022-01-19 PROCEDURE — 97110 THERAPEUTIC EXERCISES: CPT | Mod: GP | Performed by: PHYSICAL THERAPIST

## 2022-01-26 ENCOUNTER — THERAPY VISIT (OUTPATIENT)
Dept: PHYSICAL THERAPY | Facility: CLINIC | Age: 67
End: 2022-01-26
Payer: COMMERCIAL

## 2022-01-26 DIAGNOSIS — M17.11 PRIMARY OSTEOARTHRITIS OF RIGHT KNEE: ICD-10-CM

## 2022-01-26 PROCEDURE — 97110 THERAPEUTIC EXERCISES: CPT | Mod: GP | Performed by: PHYSICAL THERAPIST

## 2022-01-26 PROCEDURE — 97530 THERAPEUTIC ACTIVITIES: CPT | Mod: GP | Performed by: PHYSICAL THERAPIST

## 2022-01-26 PROCEDURE — 97010 HOT OR COLD PACKS THERAPY: CPT | Mod: GP | Performed by: PHYSICAL THERAPIST

## 2022-01-26 ASSESSMENT — ACTIVITIES OF DAILY LIVING (ADL)
STAND: ACTIVITY IS NOT DIFFICULT
WALK: ACTIVITY IS MINIMALLY DIFFICULT
KNEE_ACTIVITY_OF_DAILY_LIVING_SUM: 55
PAIN: I HAVE THE SYMPTOM BUT IT DOES NOT AFFECT MY ACTIVITY
GO UP STAIRS: ACTIVITY IS MINIMALLY DIFFICULT
SIT WITH YOUR KNEE BENT: ACTIVITY IS MINIMALLY DIFFICULT
STIFFNESS: I HAVE THE SYMPTOM BUT IT DOES NOT AFFECT MY ACTIVITY
SWELLING: I HAVE THE SYMPTOM BUT IT DOES NOT AFFECT MY ACTIVITY
LIMPING: I HAVE THE SYMPTOM BUT IT DOES NOT AFFECT MY ACTIVITY
HOW_WOULD_YOU_RATE_THE_OVERALL_FUNCTION_OF_YOUR_KNEE_DURING_YOUR_USUAL_DAILY_ACTIVITIES?: NEARLY NORMAL
GO DOWN STAIRS: ACTIVITY IS MINIMALLY DIFFICULT
WEAKNESS: I HAVE THE SYMPTOM BUT IT DOES NOT AFFECT MY ACTIVITY
SQUAT: ACTIVITY IS NOT DIFFICULT
RISE FROM A CHAIR: ACTIVITY IS MINIMALLY DIFFICULT
RAW_SCORE: 55
GIVING WAY, BUCKLING OR SHIFTING OF KNEE: I DO NOT HAVE THE SYMPTOM
KNEE_ACTIVITY_OF_DAILY_LIVING_SCORE: 78.57
HOW_WOULD_YOU_RATE_THE_CURRENT_FUNCTION_OF_YOUR_KNEE_DURING_YOUR_USUAL_DAILY_ACTIVITIES_ON_A_SCALE_FROM_0_TO_100_WITH_100_BEING_YOUR_LEVEL_OF_KNEE_FUNCTION_PRIOR_TO_YOUR_INJURY_AND_0_BEING_THE_INABILITY_TO_PERFORM_ANY_OF_YOUR_USUAL_DAILY_ACTIVITIES?: 80
AS_A_RESULT_OF_YOUR_KNEE_INJURY,_HOW_WOULD_YOU_RATE_YOUR_CURRENT_LEVEL_OF_DAILY_ACTIVITY?: NEARLY NORMAL
KNEEL ON THE FRONT OF YOUR KNEE: I AM UNABLE TO DO THE ACTIVITY

## 2022-01-26 NOTE — PROGRESS NOTES
Subjective:  HPI  Physical Exam                    Objective:  System    Physical Exam    General     ROS    Assessment/Plan:    DISCHARGE REPORT    Progress reporting period is from 1/12/22 to 1/26/22 (3 visits).       SUBJECTIVE  Subjective changes noted by patient:    Subjective: Right knee is doing better.  Riding stationay bike x 9 minutes at home.  Ascending stairs reciprocally sometimes with 1-2/10 PL.  Descending reciprocally without pain.      Current pain level is 1/10  .     Previous pain level was  NA  .   Changes in function:  Yes (See Goal flowsheet attached for changes in current functional level)  Adverse reaction to treatment or activity: None    OBJECTIVE  Changes noted in objective findings:    Objective: Right knee PROM: 0-4-112 (improved from 0-7-101)     ASSESSMENT/PLAN  Updated problem list and treatment plan: Diagnosis 1:  Right knee OA    STG/LTGs have been met or progress has been made towards goals:  Yes (See Goal flow sheet completed today.)  Assessment of Progress: The patient's condition is improving.  Self Management Plans:  Patient has been instructed in a home treatment program.    Heather continues to require the following intervention to meet STG and LTG's:  PT intervention is no longer required to meet STG/LTG.    Recommendations:  This patient is ready to be discharged from therapy and continue their home treatment program.    Please refer to the daily flowsheet for treatment today, total treatment time and time spent performing 1:1 timed codes.

## 2022-02-21 ENCOUNTER — TELEPHONE (OUTPATIENT)
Dept: CARDIOLOGY | Facility: CLINIC | Age: 67
End: 2022-02-21

## 2022-02-21 ENCOUNTER — HOSPITAL ENCOUNTER (OUTPATIENT)
Dept: CARDIOLOGY | Facility: CLINIC | Age: 67
Discharge: HOME | End: 2022-02-21
Attending: INTERNAL MEDICINE | Admitting: INTERNAL MEDICINE
Payer: COMMERCIAL

## 2022-02-21 DIAGNOSIS — I25.10 CORONARY ARTERY DISEASE INVOLVING NATIVE CORONARY ARTERY OF NATIVE HEART WITHOUT ANGINA PECTORIS: ICD-10-CM

## 2022-02-21 DIAGNOSIS — I25.10 CORONARY ARTERY DISEASE INVOLVING NATIVE CORONARY ARTERY OF NATIVE HEART WITHOUT ANGINA PECTORIS: Primary | ICD-10-CM

## 2022-02-21 DIAGNOSIS — I35.0 NONRHEUMATIC AORTIC VALVE STENOSIS: ICD-10-CM

## 2022-02-21 LAB — LVEF ECHO: NORMAL

## 2022-02-21 PROCEDURE — 999N000208 ECHOCARDIOGRAM COMPLETE

## 2022-02-21 PROCEDURE — 93306 TTE W/DOPPLER COMPLETE: CPT | Mod: 26 | Performed by: INTERNAL MEDICINE

## 2022-02-21 PROCEDURE — 255N000002 HC RX 255 OP 636: Performed by: INTERNAL MEDICINE

## 2022-02-21 RX ORDER — METOPROLOL SUCCINATE 25 MG/1
12.5 TABLET, EXTENDED RELEASE ORAL DAILY
Qty: 45 TABLET | Refills: 1 | Status: SHIPPED | OUTPATIENT
Start: 2022-02-21 | End: 2022-09-06

## 2022-02-21 RX ORDER — ATORVASTATIN CALCIUM 40 MG/1
40 TABLET, FILM COATED ORAL DAILY
Qty: 90 TABLET | Refills: 1 | Status: SHIPPED | OUTPATIENT
Start: 2022-02-21 | End: 2022-09-06

## 2022-02-21 RX ADMIN — HUMAN ALBUMIN MICROSPHERES AND PERFLUTREN 9 ML: 10; .22 INJECTION, SOLUTION INTRAVENOUS at 15:25

## 2022-02-21 NOTE — TELEPHONE ENCOUNTER
Echo 2-21-22   Left ventricular systolic function is normal.  The visual ejection fraction is 65-70%.  No regional wall motion abnormalities noted.  Severe valvular aortic stenosis.  The mean AoV pressure gradient is 50mmHg.  Compared to 9/21, aortic stenosis has progressed, mean gradient has increased from 35 to 50mm.    Next Quentin FRANCIS ELIZABETH visit 3-21-22    Last Dr. Dave visit 9-20-21

## 2022-02-22 NOTE — TELEPHONE ENCOUNTER
Message left for Patient to call back for echo results. Will review Dr. Dave's recommendation for TAVR Clinic.   Will send message to Structural Heart Team after speak with Patient.

## 2022-02-23 NOTE — TELEPHONE ENCOUNTER
Attempted to reach patient but no answer and voicemail full so unable to leave a message. Dr. Scribbles message sent to please call.

## 2022-02-25 ENCOUNTER — DOCUMENTATION ONLY (OUTPATIENT)
Dept: CARDIOLOGY | Facility: CLINIC | Age: 67
End: 2022-02-25
Payer: MEDICARE

## 2022-02-25 ENCOUNTER — TELEPHONE (OUTPATIENT)
Dept: CARDIOLOGY | Facility: CLINIC | Age: 67
End: 2022-02-25
Payer: MEDICARE

## 2022-02-25 DIAGNOSIS — I35.0 NONRHEUMATIC AORTIC VALVE STENOSIS: Primary | ICD-10-CM

## 2022-02-25 NOTE — TELEPHONE ENCOUNTER
TAVR referral received by: Dr. Dave    Chart reviewed, recent Cr/GFR noted:  Results for ABBY ASHTON (MRN [id number]) as of 2/25/2022 14:58   Ref. Range 9/9/2021 15:11   Creatinine Latest Ref Range: 0.52 - 1.04 mg/dL 0.90   GFR Estimate Latest Ref Range: >60 mL/min/1.73m2 68   Can schedule TAVR CT per protocol.  Contrast allergy: NKDA  Last dental visit:  Telephoned patient to introduce self, provide education on aortic stenosis, as well as schedule OV and TAVR CT. Voicemail box full, will reattempt contact.    ADDENDUM 3/1/22-Telephoned patient, left message requesting call back. Direct contact information provided.    Spoke with Abby. OV with Dr. Rick and TAVR CT scheduled. Date, time and location reviewed.  Symptoms: Reports shortness of breath with walking, with any distance. Resolves with ~10 minutes rest.  Support:     Saray Talamantes, RN  Structural Heart Coordinator  [facility]

## 2022-03-15 ENCOUNTER — TELEPHONE (OUTPATIENT)
Dept: CARDIOLOGY | Facility: CLINIC | Age: 67
End: 2022-03-15

## 2022-03-15 ENCOUNTER — LAB (OUTPATIENT)
Dept: LAB | Facility: CLINIC | Age: 67
End: 2022-03-15
Payer: MEDICARE

## 2022-03-15 DIAGNOSIS — I25.10 CORONARY ARTERY DISEASE INVOLVING NATIVE CORONARY ARTERY OF NATIVE HEART WITHOUT ANGINA PECTORIS: ICD-10-CM

## 2022-03-15 LAB
ANION GAP SERPL CALCULATED.3IONS-SCNC: 2 MMOL/L (ref 3–14)
BUN SERPL-MCNC: 15 MG/DL (ref 7–30)
CALCIUM SERPL-MCNC: 9.7 MG/DL (ref 8.5–10.1)
CHLORIDE BLD-SCNC: 106 MMOL/L (ref 94–109)
CHOLEST SERPL-MCNC: 136 MG/DL
CO2 SERPL-SCNC: 31 MMOL/L (ref 20–32)
CREAT SERPL-MCNC: 0.8 MG/DL (ref 0.52–1.04)
FASTING STATUS PATIENT QL REPORTED: YES
GFR SERPL CREATININE-BSD FRML MDRD: 81 ML/MIN/1.73M2
GLUCOSE BLD-MCNC: 92 MG/DL (ref 70–99)
HDLC SERPL-MCNC: 81 MG/DL
LDLC SERPL CALC-MCNC: 43 MG/DL
NONHDLC SERPL-MCNC: 55 MG/DL
POTASSIUM BLD-SCNC: 3.7 MMOL/L (ref 3.4–5.3)
SODIUM SERPL-SCNC: 139 MMOL/L (ref 133–144)
TRIGL SERPL-MCNC: 61 MG/DL

## 2022-03-15 PROCEDURE — 80061 LIPID PANEL: CPT | Performed by: INTERNAL MEDICINE

## 2022-03-15 PROCEDURE — 36415 COLL VENOUS BLD VENIPUNCTURE: CPT | Performed by: INTERNAL MEDICINE

## 2022-03-15 PROCEDURE — 80048 BASIC METABOLIC PNL TOTAL CA: CPT | Performed by: INTERNAL MEDICINE

## 2022-03-15 NOTE — TELEPHONE ENCOUNTER
Message to in-clinic RN to please mail result letter.       1400 results letter mailed to patient.

## 2022-03-15 NOTE — TELEPHONE ENCOUNTER
BMP/FLP completed today as below, echo done 2/21/22. Pt to see Dr Rick for TAVR consult 3/31/22. Annual ELIZABETH visit was cancelled. Routed to Dr Dave for review.     Component      Latest Ref Rng & Units 3/15/2022   Sodium      133 - 144 mmol/L 139   Potassium      3.4 - 5.3 mmol/L 3.7   Chloride      94 - 109 mmol/L 106   Carbon Dioxide      20 - 32 mmol/L 31   Anion Gap      3 - 14 mmol/L 2 (L)   Urea Nitrogen      7 - 30 mg/dL 15   Creatinine      0.52 - 1.04 mg/dL 0.80   Calcium      8.5 - 10.1 mg/dL 9.7   Glucose      70 - 99 mg/dL 92   GFR Estimate      >60 mL/min/1.73m2 81   Cholesterol      <200 mg/dL 136   Triglycerides      <150 mg/dL 61   HDL Cholesterol      >=50 mg/dL 81   LDL Cholesterol Calculated      <=100 mg/dL 43   Non HDL Cholesterol      <130 mg/dL 55   Patient Fasting > 8hrs?       Yes

## 2022-03-15 NOTE — LETTER
March 15, 2022       TO: Heather Bauer   [address] [address] [address]       Dear Ms. Bauer,    The results of your recent Laboratory tests.    Results for orders placed or performed in visit on 03/15/22   Basic metabolic panel     Status: Abnormal   Result Value Ref Range    Sodium 139 133 - 144 mmol/L    Potassium 3.7 3.4 - 5.3 mmol/L    Chloride 106 94 - 109 mmol/L    Carbon Dioxide (CO2) 31 20 - 32 mmol/L    Anion Gap 2 (L) 3 - 14 mmol/L    Urea Nitrogen 15 7 - 30 mg/dL    Creatinine 0.80 0.52 - 1.04 mg/dL    Calcium 9.7 8.5 - 10.1 mg/dL    Glucose 92 70 - 99 mg/dL    GFR Estimate 81 >60 mL/min/1.73m2   Lipid Profile     Status: None   Result Value Ref Range    Cholesterol 136 <200 mg/dL    Triglycerides 61 <150 mg/dL    Direct Measure HDL 81 >=50 mg/dL    LDL Cholesterol Calculated 43 <=100 mg/dL    Non HDL Cholesterol 55 <130 mg/dL    Patient Fasting > 8hrs? Yes     Narrative    Cholesterol  Desirable:  <200 mg/dL    Triglycerides  Normal:  Less than 150 mg/dL  Borderline High:  150-199 mg/dL  High:  200-499 mg/dL  Very High:  Greater than or equal to 500 mg/dL    Direct Measure HDL  Female:  Greater than or equal to 50 mg/dL   Male:  Greater than or equal to 40 mg/dL    LDL Cholesterol  Desirable:  <100mg/dL  Above Desirable:  100-129 mg/dL   Borderline High:  130-159 mg/dL   High:  160-189 mg/dL   Very High:  >= 190 mg/dL    Non HDL Cholesterol  Desirable:  130 mg/dL  Above Desirable:  130-159 mg/dL  Borderline High:  160-189 mg/dL  High:  190-219 mg/dL  Very High:  Greater than or equal to 220 mg/dL     Labs are stable.  Your appointment with Dr. Rick is on 3/31/2022 at 1:15 PM.    Sincerely,    [facility]

## 2022-03-31 ENCOUNTER — HOSPITAL ENCOUNTER (OUTPATIENT)
Dept: CARDIOLOGY | Facility: CLINIC | Age: 67
Discharge: HOME | End: 2022-03-31
Attending: INTERNAL MEDICINE | Admitting: INTERNAL MEDICINE
Payer: MEDICARE

## 2022-03-31 ENCOUNTER — OFFICE VISIT (OUTPATIENT)
Dept: CARDIOLOGY | Facility: CLINIC | Age: 67
End: 2022-03-31
Payer: MEDICARE

## 2022-03-31 VITALS
HEART RATE: 70 BPM | WEIGHT: 224.9 LBS | DIASTOLIC BLOOD PRESSURE: 76 MMHG | HEIGHT: 68 IN | BODY MASS INDEX: 34.08 KG/M2 | SYSTOLIC BLOOD PRESSURE: 112 MMHG

## 2022-03-31 DIAGNOSIS — I35.0 NONRHEUMATIC AORTIC VALVE STENOSIS: ICD-10-CM

## 2022-03-31 DIAGNOSIS — I35.0 NONRHEUMATIC AORTIC VALVE STENOSIS: Primary | ICD-10-CM

## 2022-03-31 PROCEDURE — 93000 ELECTROCARDIOGRAM COMPLETE: CPT | Performed by: INTERNAL MEDICINE

## 2022-03-31 PROCEDURE — 99214 OFFICE O/P EST MOD 30 MIN: CPT | Performed by: INTERNAL MEDICINE

## 2022-03-31 PROCEDURE — 74174 CTA ABD&PLVS W/CONTRAST: CPT

## 2022-03-31 PROCEDURE — 71275 CT ANGIOGRAPHY CHEST: CPT | Mod: 26 | Performed by: INTERNAL MEDICINE

## 2022-03-31 PROCEDURE — 74174 CTA ABD&PLVS W/CONTRAST: CPT | Mod: 26 | Performed by: INTERNAL MEDICINE

## 2022-03-31 PROCEDURE — 250N000011 HC RX IP 250 OP 636: Performed by: INTERNAL MEDICINE

## 2022-03-31 RX ORDER — ACYCLOVIR 200 MG/1
0-1 CAPSULE ORAL
Status: DISCONTINUED | OUTPATIENT
Start: 2022-03-31 | End: 2022-04-01 | Stop reason: HOSPADM

## 2022-03-31 RX ORDER — DIPHENHYDRAMINE HYDROCHLORIDE 50 MG/ML
25-50 INJECTION INTRAMUSCULAR; INTRAVENOUS
Status: DISCONTINUED | OUTPATIENT
Start: 2022-03-31 | End: 2022-04-01 | Stop reason: HOSPADM

## 2022-03-31 RX ORDER — ONDANSETRON 2 MG/ML
4 INJECTION INTRAMUSCULAR; INTRAVENOUS
Status: DISCONTINUED | OUTPATIENT
Start: 2022-03-31 | End: 2022-04-01 | Stop reason: HOSPADM

## 2022-03-31 RX ORDER — IOPAMIDOL 755 MG/ML
115 INJECTION, SOLUTION INTRAVASCULAR ONCE
Status: COMPLETED | OUTPATIENT
Start: 2022-03-31 | End: 2022-03-31

## 2022-03-31 RX ORDER — METHYLPREDNISOLONE SODIUM SUCCINATE 125 MG/2ML
125 INJECTION, POWDER, LYOPHILIZED, FOR SOLUTION INTRAMUSCULAR; INTRAVENOUS
Status: DISCONTINUED | OUTPATIENT
Start: 2022-03-31 | End: 2022-04-01 | Stop reason: HOSPADM

## 2022-03-31 RX ORDER — DIPHENHYDRAMINE HCL 25 MG
25 CAPSULE ORAL
Status: DISCONTINUED | OUTPATIENT
Start: 2022-03-31 | End: 2022-04-01 | Stop reason: HOSPADM

## 2022-03-31 RX ADMIN — IOPAMIDOL 115 ML: 755 INJECTION, SOLUTION INTRAVENOUS at 15:13

## 2022-03-31 NOTE — LETTER
3/31/2022    Sylvia Bird MD  [address]  [facility] [address]    RE: Heather Bauer       Dear Colleague,     I had the pleasure of seeing Heather Bauer in the [facility].    Structural Heart Cardiology Consultation       Assessment & Plan     1.  Severe symptomatic aortic stenosis  2.  History of coronary artery disease with PCI to LAD 2019  3.  Elevated BMI  4.  Hypertension  5.  Hyperlipidemia      Recommendations    1.  We had a alexis discussion regarding TAVR and indications as well as therapeutic measures directed toward her aortic stenosis.  Given her age she is certainly a candidate for either SAVR or TAVR.  However she has plainly stated that her preference is for TAVR procedure.  We discussed that the screening study is still pending.  Once we have sizing of her iliofemoral arteries and annular dimensions we can have a unremarkable normal recommendation regarding feasibility of TAVR.    2.  Risk benefits and potential complications of TAVR were discussed and she is willing to proceed if her anatomy is favorable    3/  We also discussed that she will need coronary angiography prior to her implant.  She is well versed in this as she has had angiogram in 2019.  Risk benefits and complications were discussed regarding angiography.    4.  Finally we discussed that should she be agreeable we will then discuss her case in our TAVR conference for consensus opinion.    Thank you kindly for consult        Sundar Rick MD          HPI:    Patient is a very pleasant 67-year-old woman who is accompanied by her  to the TAVR clinic.  She has been followed by my colleague .  Patient over the last 6 to 8 months has noticed progressive shortness of breath with exertion.  This is mainly going up a flight of stairs or an incline.  Although she mentions some day-to-day activities which are now more difficult than before.  This is been a slow decline in confirmed by her  .  She reports of no chest pain PND or any other cardiac related symptoms.  Remotely she did have PCI to her LAD in 2019 and has no symptoms referable to this.  A TAVR directed CT is currently being performed the findings are pending at time of my discussion.  Here to discuss potential therapeutic measures to address her severe symptomatic aortic stenosis.        Echo February 2022  Left ventricular systolic function is normal.  The visual ejection fraction is 65-70%.  No regional wall motion abnormalities noted.  Severe valvular aortic stenosis.  The mean AoV pressure gradient is 50mmHg.  Compared to 9/21, aortic stenosis has progressed, mean gradient has increased  from 35 to 50mm. The study was technically difficult.    Primary Care Physician   Sylvia Bird          Patient Active Problem List   Diagnosis     Essential hypertension, benign     HCD (health care directive)     CARDIOVASCULAR SCREENING; LDL GOAL LESS THAN 130     Class 1 obesity due to excess calories with serious comorbidity and body mass index (BMI) of 33.0 to 33.9 in adult     BORJA (dyspnea on exertion)     Nonrheumatic aortic valve stenosis     Abnormal stress test     Coronary artery disease involving native coronary artery of native heart without angina pectoris     Hypercholesteremia       Past Medical History   I have reviewed this patient's medical history and updated it with pertinent information if needed.   Past Medical History:   Diagnosis Date     Aortic valve stenosis      Essential hypertension, benign      Fracture, tibia, shaft      History of colposcopy with cervical biopsy 2/22/11    AMRITA I     Papanicolaou smear of vagina with atypical squamous cells cannot exclude high grade squamous intraepithelial lesion (ASC-H) 1/31/11       Past Surgical History   I have reviewed this patient's surgical history and updated it with pertinent information if needed.  Past Surgical History:   Procedure Laterality Date     CV CORONARY  ANGIOGRAM N/A 3/11/2019    Procedure: Coronary Angiogram;  Surgeon: Adolph Dave MD;  Location:  HEART CARDIAC CATH LAB     CV PCI ANGIOPLASTY N/A 3/11/2019    Procedure: Percutaneous Coronary Intervention;  Surgeon: Adolph Dave MD;  Location: [facility] CARDIAC CATH LAB     [facility] APPENDECTOMY         Prior to Admission Medications   Cannot display prior to admission medications because the patient has not been admitted in this contact.     [unfilled]  [unfilled]  Allergies   Allergies   Allergen Reactions     No Known Drug Allergies        Social History    reports that she has quit smoking. She has never used smokeless tobacco. She reports current alcohol use. She reports that she does not use drugs.    Family History   Family History   Problem Relation Age of Onset     C.A.D. Mother         early 60's     Lipids Mother      Heart Disease Mother 68        heart bypass surgery     Hypertension Mother      Thyroid Disease Mother      Heart Disease Father         valvular disease     Thyroid Disease Paternal Aunt      Heart Disease Maternal Grandmother         heart attack       Review of Systems   The comprehensive 10 point Review of Systems is negative other than noted in the HPI or here.     Physical Exam   Vital Signs with Ranges  BP: ()/()   Arterial Line BP: ()/()   Wt Readings from Last 4 Encounters:   12/20/21 99.7 kg (219 lb 14.4 oz)   12/17/21 99.8 kg (220 lb)   09/20/21 99.3 kg (219 lb)   03/08/21 102.5 kg (226 lb)     [unfilled]      Vitals: LMP 07/07/2008     GENERAL: Healthy, alert and no distress  EYES: Eyes grossly normal to inspection.  No discharge or erythema, or obvious scleral/conjunctival abnormalities.  RESP: No audible wheeze, cough, or visible cyanosis.  No visible retractions or increased work of breathing.    SKIN: Visible skin clear. No significant rash, abnormal pigmentation or lesions.  NEURO: Cranial nerves grossly intact.  Mentation and speech appropriate for  age.  PSYCH: Mentation appears normal, affect normal/bright, judgement and insight intact, normal speech and appearance well-groomed.    TAVR Coordinator visit:  Provided additional education regarding TAVR procedure, after being present for discussion with physician. Explained the work-up process and next steps for patient. Patient provided our direct contact number and instructed to call with any questions.     Completed frailty testing and KCCQ.   5 meter walk: 4.8  Frailty score: 0/5    KCCQ Results:   1a. 5  1b. 3  1c. 5  2. 5  3. 3  4. 3  5. 5  6. 5  7. 4  8a. 4  8b. 4  8c. 5    Preliminary STS Risk Score: 0.73  NYHA Class: II    Patient is having TAVR CT today, 03/31/22 and was provided additional TAVR packet information with my structural RN phone number for additional questions/concerns.     Joan Sanabria RN  Structural Heart Coordinator  [facility]    Thank you for allowing me to participate in the care of your patient.      Sincerely,     Sundar Rick MD     [facility]  cc:   No referring provider defined for this encounter.

## 2022-03-31 NOTE — PROGRESS NOTES
Structural Heart Cardiology Consultation       Assessment & Plan     1.  Severe symptomatic aortic stenosis  2.  History of coronary artery disease with PCI to LAD 2019  3.  Elevated BMI  4.  Hypertension  5.  Hyperlipidemia      Recommendations    1.  We had a alexis discussion regarding TAVR and indications as well as therapeutic measures directed toward her aortic stenosis.  Given her age she is certainly a candidate for either SAVR or TAVR.  However she has plainly stated that her preference is for TAVR procedure.  We discussed that the screening study is still pending.  Once we have sizing of her iliofemoral arteries and annular dimensions we can have a unremarkable normal recommendation regarding feasibility of TAVR.    2.  Risk benefits and potential complications of TAVR were discussed and she is willing to proceed if her anatomy is favorable    3/  We also discussed that she will need coronary angiography prior to her implant.  She is well versed in this as she has had angiogram in 2019.  Risk benefits and complications were discussed regarding angiography.    4.  Finally we discussed that should she be agreeable we will then discuss her case in our TAVR conference for consensus opinion.    Thank you kindly for consult        Sundar Rick MD          HPI:    Patient is a very pleasant 67-year-old woman who is accompanied by her  to the TAVR clinic.  She has been followed by my colleague .  Patient over the last 6 to 8 months has noticed progressive shortness of breath with exertion.  This is mainly going up a flight of stairs or an incline.  Although she mentions some day-to-day activities which are now more difficult than before.  This is been a slow decline in confirmed by her .  She reports of no chest pain PND or any other cardiac related symptoms.  Remotely she did have PCI to her LAD in 2019 and has no symptoms referable to this.  A TAVR directed CT is currently being  performed the findings are pending at time of my discussion.  Here to discuss potential therapeutic measures to address her severe symptomatic aortic stenosis.        Echo February 2022  Left ventricular systolic function is normal.  The visual ejection fraction is 65-70%.  No regional wall motion abnormalities noted.  Severe valvular aortic stenosis.  The mean AoV pressure gradient is 50mmHg.  Compared to 9/21, aortic stenosis has progressed, mean gradient has increased  from 35 to 50mm. The study was technically difficult.    Primary Care Physician   Sylvia Bird          Patient Active Problem List   Diagnosis     Essential hypertension, benign     HCD (health care directive)     CARDIOVASCULAR SCREENING; LDL GOAL LESS THAN 130     Class 1 obesity due to excess calories with serious comorbidity and body mass index (BMI) of 33.0 to 33.9 in adult     BORJA (dyspnea on exertion)     Nonrheumatic aortic valve stenosis     Abnormal stress test     Coronary artery disease involving native coronary artery of native heart without angina pectoris     Hypercholesteremia       Past Medical History   I have reviewed this patient's medical history and updated it with pertinent information if needed.   Past Medical History:   Diagnosis Date     Aortic valve stenosis      Essential hypertension, benign      Fracture, tibia, shaft      History of colposcopy with cervical biopsy 2/22/11    AMRITA I     Papanicolaou smear of vagina with atypical squamous cells cannot exclude high grade squamous intraepithelial lesion (ASC-H) 1/31/11       Past Surgical History   I have reviewed this patient's surgical history and updated it with pertinent information if needed.  Past Surgical History:   Procedure Laterality Date     CV CORONARY ANGIOGRAM N/A 3/11/2019    Procedure: Coronary Angiogram;  Surgeon: Adolph Dave MD;  Location:  HEART CARDIAC CATH LAB     CV PCI ANGIOPLASTY N/A 3/11/2019    Procedure: Percutaneous Coronary  Intervention;  Surgeon: Adolph Dave MD;  Location:  HEART CARDIAC CATH LAB     [facility] APPENDECTOMY         Prior to Admission Medications   Cannot display prior to admission medications because the patient has not been admitted in this contact.     [unfilled]  [unfilled]  Allergies   Allergies   Allergen Reactions     No Known Drug Allergies        Social History    reports that she has quit smoking. She has never used smokeless tobacco. She reports current alcohol use. She reports that she does not use drugs.    Family History   Family History   Problem Relation Age of Onset     C.A.D. Mother         early 60's     Lipids Mother      Heart Disease Mother 68        heart bypass surgery     Hypertension Mother      Thyroid Disease Mother      Heart Disease Father         valvular disease     Thyroid Disease Paternal Aunt      Heart Disease Maternal Grandmother         heart attack       Review of Systems   The comprehensive 10 point Review of Systems is negative other than noted in the HPI or here.     Physical Exam   Vital Signs with Ranges  BP: ()/()   Arterial Line BP: ()/()   Wt Readings from Last 4 Encounters:   12/20/21 99.7 kg (219 lb 14.4 oz)   12/17/21 99.8 kg (220 lb)   09/20/21 99.3 kg (219 lb)   03/08/21 102.5 kg (226 lb)     [unfilled]      Vitals: LMP 07/07/2008     GENERAL: Healthy, alert and no distress  EYES: Eyes grossly normal to inspection.  No discharge or erythema, or obvious scleral/conjunctival abnormalities.  RESP: No audible wheeze, cough, or visible cyanosis.  No visible retractions or increased work of breathing.    SKIN: Visible skin clear. No significant rash, abnormal pigmentation or lesions.  NEURO: Cranial nerves grossly intact.  Mentation and speech appropriate for age.  PSYCH: Mentation appears normal, affect normal/bright, judgement and insight intact, normal speech and appearance well-groomed.

## 2022-03-31 NOTE — PROGRESS NOTES
TAVR Coordinator visit:  Provided additional education regarding TAVR procedure, after being present for discussion with physician. Explained the work-up process and next steps for patient. Patient provided our direct contact number and instructed to call with any questions.     Completed frailty testing and KCCQ.   5 meter walk: 4.8  Frailty score: 0/5    KCCQ Results:   1a. 5  1b. 3  1c. 5  2. 5  3. 3  4. 3  5. 5  6. 5  7. 4  8a. 4  8b. 4  8c. 5    Preliminary STS Risk Score: 0.73  NYHA Class: II    Patient is having TAVR CT today, 03/31/22 and was provided additional TAVR packet information with my structural RN phone number for additional questions/concerns.     Joan Sanabria, RN  Structural Heart Coordinator  [facility]

## 2022-04-01 NOTE — RESULT ENCOUNTER NOTE
TAVR CT done as part of TAVR workup. Radiologist reading found 3mm subpleural nodule in right lower lobe. Will route to PCP per protocol as an FYI and for follow-up.

## 2022-04-04 ENCOUNTER — TELEPHONE (OUTPATIENT)
Dept: CARDIOLOGY | Facility: CLINIC | Age: 67
End: 2022-04-04
Payer: COMMERCIAL

## 2022-04-04 DIAGNOSIS — R06.09 DOE (DYSPNEA ON EXERTION): Primary | ICD-10-CM

## 2022-04-04 DIAGNOSIS — I25.10 CORONARY ARTERY DISEASE INVOLVING NATIVE CORONARY ARTERY OF NATIVE HEART WITHOUT ANGINA PECTORIS: ICD-10-CM

## 2022-04-04 DIAGNOSIS — Z11.59 ENCOUNTER FOR SCREENING FOR OTHER VIRAL DISEASES: Primary | ICD-10-CM

## 2022-04-04 NOTE — TELEPHONE ENCOUNTER
Called patient to update her that her TAVT CT results have been reviewed and we can move forward with a coronary angiogram for TAVR workup.     Additionally did discuss that the CT found patient has a 3mm nodule in her right lower lobe and that those results were passed on to the primary MD.     Patient stated she understood and would anticipate a phone call from our , Breanna, to get angiogram setup.

## 2022-04-05 ENCOUNTER — TELEPHONE (OUTPATIENT)
Dept: CARDIOLOGY | Facility: CLINIC | Age: 67
End: 2022-04-05
Payer: COMMERCIAL

## 2022-04-05 NOTE — TELEPHONE ENCOUNTER
[facility] Call Center    Phone Message    May a detailed message be left on voicemail: yes     Reason for Call: Other: Kathrin called to update on PA for Insertion of tube in Coronary artery    Procedure has been approved and Authorization # is [id number]  Valid for 30 days starting today 4/5/22    Action Taken: Other: cardiology    Travel Screening: Not Applicable

## 2022-04-16 ENCOUNTER — HEALTH MAINTENANCE LETTER (OUTPATIENT)
Age: 67
End: 2022-04-16

## 2022-04-21 ENCOUNTER — TELEPHONE (OUTPATIENT)
Dept: CARDIOLOGY | Facility: CLINIC | Age: 67
End: 2022-04-21
Payer: COMMERCIAL

## 2022-04-21 DIAGNOSIS — R06.09 DOE (DYSPNEA ON EXERTION): Primary | ICD-10-CM

## 2022-04-21 DIAGNOSIS — I25.10 CORONARY ARTERY DISEASE INVOLVING NATIVE CORONARY ARTERY OF NATIVE HEART WITHOUT ANGINA PECTORIS: ICD-10-CM

## 2022-04-21 RX ORDER — LIDOCAINE 40 MG/G
CREAM TOPICAL
Status: CANCELLED | OUTPATIENT
Start: 2022-04-21

## 2022-04-21 RX ORDER — ASPIRIN 81 MG/1
243 TABLET, CHEWABLE ORAL ONCE
Status: CANCELLED | OUTPATIENT
Start: 2022-04-21

## 2022-04-21 RX ORDER — POTASSIUM CHLORIDE 1500 MG/1
20 TABLET, EXTENDED RELEASE ORAL
Status: CANCELLED | OUTPATIENT
Start: 2022-04-21

## 2022-04-21 RX ORDER — ASPIRIN 325 MG
325 TABLET ORAL ONCE
Status: CANCELLED | OUTPATIENT
Start: 2022-04-21 | End: 2022-04-21

## 2022-04-21 RX ORDER — SODIUM CHLORIDE 9 MG/ML
INJECTION, SOLUTION INTRAVENOUS CONTINUOUS
Status: CANCELLED | OUTPATIENT
Start: 2022-04-21

## 2022-04-21 NOTE — TELEPHONE ENCOUNTER
[facility] HEART [facility] - ANGIOGRAM INSTRUCTIONS:  - Heather Bauer is scheduled for a coronary angiogram at [facility] on 2022. Check in time is at 0630 and procedure time is at 0830.  - Advised patient not eat or drink after midnight on day of procedure.   - Patient advised to take morning medications with a sip of water on the day of the procedure, noting the followin. Aspirin: Patient is currently taking 81mg of aspirin, patient instructed to take 4 tabs (324mg) of aspirin the morning of the procedure.  2. Diabetic Medications: Patient does not take Metformin or other diabetic medications.  3. Anticoagulants: Patient does not take an anticoagulant.  4. Diuretics: Patient advised to hold furosemide the morning of the procedure.   5. All vitamins and supplements to be held the morning of the procedure.  - Verified patient does not have an allergy to contrast dye.  - Verified patient has someone available to drive them home from the hospital and can stay with them for 24 hours after the procedure. They understand that one visitor may accompany them into the hospital on the day of angiogram, with both patient and visitor to wear a mask.  - COVID testing: Scheduled on 2022    Angiogram orders have been signed & held.    Joan Sanabria RN  [facility] Heart [facility]

## 2022-04-25 ENCOUNTER — LAB (OUTPATIENT)
Dept: LAB | Facility: CLINIC | Age: 67
End: 2022-04-25
Payer: COMMERCIAL

## 2022-04-25 DIAGNOSIS — Z11.59 ENCOUNTER FOR SCREENING FOR OTHER VIRAL DISEASES: ICD-10-CM

## 2022-04-25 PROCEDURE — U0005 INFEC AGEN DETEC AMPLI PROBE: HCPCS

## 2022-04-25 PROCEDURE — U0003 INFECTIOUS AGENT DETECTION BY NUCLEIC ACID (DNA OR RNA); SEVERE ACUTE RESPIRATORY SYNDROME CORONAVIRUS 2 (SARS-COV-2) (CORONAVIRUS DISEASE [COVID-19]), AMPLIFIED PROBE TECHNIQUE, MAKING USE OF HIGH THROUGHPUT TECHNOLOGIES AS DESCRIBED BY CMS-2020-01-R: HCPCS

## 2022-04-25 NOTE — TELEPHONE ENCOUNTER
Patient called the heart clinic today with an update that she still does not have a fever but does have an occasion cough. Patient stated the cough can get worse if she drinks something very cold.     Patient stated she does have a little bit of a sore throat but otherwise is still feeling fine. Informed patient if her covid test comes back negative and she does not develop any new or worsening symptoms she should be fine for her angiogram on 04/27/2022. Patient stated she understood.

## 2022-04-26 ENCOUNTER — TELEPHONE (OUTPATIENT)
Dept: MEDSURG UNIT | Facility: CLINIC | Age: 67
End: 2022-04-26
Payer: COMMERCIAL

## 2022-04-26 ENCOUNTER — TELEPHONE (OUTPATIENT)
Dept: CARDIOLOGY | Facility: CLINIC | Age: 67
End: 2022-04-26
Payer: COMMERCIAL

## 2022-04-26 LAB — SARS-COV-2 RNA RESP QL NAA+PROBE: POSITIVE

## 2022-04-26 NOTE — TELEPHONE ENCOUNTER
COVID test result noted positive 4/26/22. She states she did have cold symptoms last week, no fever. Understands we will cancel angiogram scheduled tomorrow 4/27/22. Will have  Melany contact her tomorrow to reschedule in about 10 days. She understands she DOES NOT need another COVID test pre-procedure.    Cath lab and Care Suites aware of cancellation.    Saray Talamantes RN  Structural Heart Coordinator  [facility]

## 2022-04-26 NOTE — PROGRESS NOTES
Chart reviewed for upcoming procedure tomorrow.  CSE, Orders in, on ASA, Covid test pending, NKDA.

## 2022-04-29 ENCOUNTER — VIRTUAL VISIT (OUTPATIENT)
Dept: PEDIATRICS | Facility: CLINIC | Age: 67
End: 2022-04-29
Payer: COMMERCIAL

## 2022-04-29 DIAGNOSIS — U07.1 INFECTION DUE TO 2019 NOVEL CORONAVIRUS: Primary | ICD-10-CM

## 2022-04-29 PROCEDURE — 99213 OFFICE O/P EST LOW 20 MIN: CPT | Mod: GT | Performed by: INTERNAL MEDICINE

## 2022-04-29 NOTE — PROGRESS NOTES
"Heather is a 67 year old who is being evaluated via a billable video visit.      How would you like to obtain your AVS? MyChart  If the video visit is dropped, the invitation should be resent by: Text to cell phone: .  Will anyone else be joining your video visit? No      Video Start Time: 9:06 AM    Assessment & Plan     Infection due to 2019 novel coronavirus  Discussed treatment options. She does qualify for monocolonal ab treatments (based on age, obesity, cardiac disease), but is on day 9 of illness, had mild symptoms, and is already feeling very well and recovering. Referral for treatments not recommended, patient agrees.  Discussed quarantine and booster vaccine recommendations.                BMI:   Estimated body mass index is 34.2 kg/m  as calculated from the following:    Height as of 3/31/22: 1.727 m (5' 8\").    Weight as of 3/31/22: 102 kg (224 lb 14.4 oz).   Weight management plan: not discussed        Return for if symptoms worsen, or as needed.    Melissa Trinidad MD  [facility]    Muna Romero is a 67 year old who presents for the following health issues     HPI     Heather tested positive for covid about four days ago.  She has had mild symptoms, started feeling symptomatic on 4/20 (nine days ago). Her symptoms started with sore throat, then the sore throat became sever and she also developed cough and congestion.  Currently she feels better, symptoms are limited to a mild scratchy throat.     She does not have any shortness of breath.     She was vaccinated x2 doses with moderna vaccine.    She has severe aortic valve stenosis.  She was scheduled for the covid PCR as a pre-procedure test prior to angiogram.  The angiogram will be rescheduled. She is being worked up in the TAVR clinic.     She is wondering about antiviral options.             Review of Systems   Constitutional, HEENT, cardiovascular, pulmonary, gi and gu systems are negative, except as otherwise noted.    "   Objective           Vitals:  No vitals were obtained today due to virtual visit.    Physical Exam   GENERAL: Healthy, alert and no distress  EYES: Eyes grossly normal to inspection.  No discharge or erythema, or obvious scleral/conjunctival abnormalities.  RESP: No audible wheeze, cough, or visible cyanosis.  No visible retractions or increased work of breathing.    SKIN: Visible skin clear. No significant rash, abnormal pigmentation or lesions.  NEURO: Cranial nerves grossly intact.  Mentation and speech appropriate for age.  PSYCH: Mentation appears normal, affect normal/bright, judgement and insight intact, normal speech and appearance well-groomed.                Video-Visit Details    Type of service:  Video Visit    Video End Time:9:19 AM    Originating Location (pt. Location): Home    Distant Location (provider location):  [facility] [employer]     Platform used for Video Visit: Fengguo

## 2022-05-04 ENCOUNTER — TELEPHONE (OUTPATIENT)
Dept: CARDIOLOGY | Facility: CLINIC | Age: 67
End: 2022-05-04
Payer: COMMERCIAL

## 2022-05-04 NOTE — TELEPHONE ENCOUNTER
Patient's coronary angiogram was rescheduled after she tested positive for covid on 2022. Called and reviewed the below instructions with patient again.     [facility] - ANGIOGRAM INSTRUCTIONS:  - Heather Bauer is scheduled for a coronary angiogram at [facility] on 2022. Check in time is at 0830 and procedure time is at 1030.  - Advised patient not eat or drink after midnight on day of procedure.   - Patient advised to take morning medications with a sip of water on the day of the procedure, noting the followin. Aspirin: Patient is currently taking 81mg of aspirin, patient instructed to take 4 tabs (324mg) of aspirin the morning of the procedure.  2. Diabetic Medications: Patient does not take Metformin or other diabetic medications.  3. Anticoagulants: Patient does not take an anticoagulant.  4. Diuretics: Patient advised to hold furosemide the morning of the procedure.   5. All vitamins and supplements to be held the morning of the procedure.  - Verified patient does not have an allergy to contrast dye.  - Verified patient has someone available to drive them home from the hospital and can stay with them for 24 hours after the procedure. They understand that one visitor may accompany them into the hospital on the day of angiogram, with both patient and visitor to wear a mask.  - COVID testing: Not needed as patient tested positive for covid on 2022.    Angiogram orders have been signed & held.     Joan Sanabria RN  [facility] Heart [facility]

## 2022-05-09 ENCOUNTER — HOSPITAL ENCOUNTER (OUTPATIENT)
Facility: CLINIC | Age: 67
Discharge: HOME | End: 2022-05-09
Admitting: INTERNAL MEDICINE
Payer: COMMERCIAL

## 2022-05-09 VITALS
RESPIRATION RATE: 16 BRPM | OXYGEN SATURATION: 100 % | WEIGHT: 217.6 LBS | HEIGHT: 68 IN | DIASTOLIC BLOOD PRESSURE: 70 MMHG | SYSTOLIC BLOOD PRESSURE: 98 MMHG | TEMPERATURE: 97.8 F | HEART RATE: 61 BPM | BODY MASS INDEX: 32.98 KG/M2

## 2022-05-09 DIAGNOSIS — I25.10 CORONARY ARTERY DISEASE INVOLVING NATIVE CORONARY ARTERY OF NATIVE HEART WITHOUT ANGINA PECTORIS: ICD-10-CM

## 2022-05-09 DIAGNOSIS — R06.09 DOE (DYSPNEA ON EXERTION): ICD-10-CM

## 2022-05-09 PROBLEM — Z98.890 STATUS POST CORONARY ANGIOGRAM: Status: ACTIVE | Noted: 2022-05-09

## 2022-05-09 LAB
ANION GAP SERPL CALCULATED.3IONS-SCNC: 3 MMOL/L (ref 3–14)
APTT PPP: 29 SECONDS (ref 22–38)
BUN SERPL-MCNC: 15 MG/DL (ref 7–30)
CALCIUM SERPL-MCNC: 9.6 MG/DL (ref 8.5–10.1)
CHLORIDE BLD-SCNC: 106 MMOL/L (ref 94–109)
CO2 SERPL-SCNC: 29 MMOL/L (ref 20–32)
CREAT SERPL-MCNC: 0.78 MG/DL (ref 0.52–1.04)
ERYTHROCYTE [DISTWIDTH] IN BLOOD BY AUTOMATED COUNT: 12.4 % (ref 10–15)
GFR SERPL CREATININE-BSD FRML MDRD: 84 ML/MIN/1.73M2
GLUCOSE BLD-MCNC: 103 MG/DL (ref 70–99)
HCT VFR BLD AUTO: 41.2 % (ref 35–47)
HGB BLD-MCNC: 13.5 G/DL (ref 11.7–15.7)
INR PPP: 1.05 (ref 0.85–1.15)
MCH RBC QN AUTO: 31.8 PG (ref 26.5–33)
MCHC RBC AUTO-ENTMCNC: 32.8 G/DL (ref 31.5–36.5)
MCV RBC AUTO: 97 FL (ref 78–100)
PLATELET # BLD AUTO: 310 10E3/UL (ref 150–450)
POTASSIUM BLD-SCNC: 3.8 MMOL/L (ref 3.4–5.3)
RBC # BLD AUTO: 4.24 10E6/UL (ref 3.8–5.2)
SODIUM SERPL-SCNC: 138 MMOL/L (ref 133–144)
WBC # BLD AUTO: 5.6 10E3/UL (ref 4–11)

## 2022-05-09 PROCEDURE — 250N000009 HC RX 250: Performed by: INTERNAL MEDICINE

## 2022-05-09 PROCEDURE — 250N000013 HC RX MED GY IP 250 OP 250 PS 637: Performed by: INTERNAL MEDICINE

## 2022-05-09 PROCEDURE — C1887 CATHETER, GUIDING: HCPCS | Performed by: INTERNAL MEDICINE

## 2022-05-09 PROCEDURE — 999N000071 HC STATISTIC HEART CATH LAB OR EP LAB

## 2022-05-09 PROCEDURE — 99152 MOD SED SAME PHYS/QHP 5/>YRS: CPT | Performed by: INTERNAL MEDICINE

## 2022-05-09 PROCEDURE — 272N000001 HC OR GENERAL SUPPLY STERILE: Performed by: INTERNAL MEDICINE

## 2022-05-09 PROCEDURE — 999N000184 HC STATISTIC TELEMETRY

## 2022-05-09 PROCEDURE — C1769 GUIDE WIRE: HCPCS | Performed by: INTERNAL MEDICINE

## 2022-05-09 PROCEDURE — C1894 INTRO/SHEATH, NON-LASER: HCPCS | Performed by: INTERNAL MEDICINE

## 2022-05-09 PROCEDURE — 258N000003 HC RX IP 258 OP 636: Performed by: INTERNAL MEDICINE

## 2022-05-09 PROCEDURE — 93454 CORONARY ARTERY ANGIO S&I: CPT | Mod: 26 | Performed by: INTERNAL MEDICINE

## 2022-05-09 PROCEDURE — 999N000054 HC STATISTIC EKG NON-CHARGEABLE

## 2022-05-09 PROCEDURE — 82310 ASSAY OF CALCIUM: CPT | Performed by: INTERNAL MEDICINE

## 2022-05-09 PROCEDURE — 36591 DRAW BLOOD OFF VENOUS DEVICE: CPT

## 2022-05-09 PROCEDURE — 85730 THROMBOPLASTIN TIME PARTIAL: CPT | Mod: GZ | Performed by: INTERNAL MEDICINE

## 2022-05-09 PROCEDURE — 93005 ELECTROCARDIOGRAM TRACING: CPT

## 2022-05-09 PROCEDURE — 93010 ELECTROCARDIOGRAM REPORT: CPT | Performed by: INTERNAL MEDICINE

## 2022-05-09 PROCEDURE — 250N000011 HC RX IP 250 OP 636: Performed by: INTERNAL MEDICINE

## 2022-05-09 PROCEDURE — 99153 MOD SED SAME PHYS/QHP EA: CPT | Performed by: INTERNAL MEDICINE

## 2022-05-09 PROCEDURE — 85014 HEMATOCRIT: CPT | Performed by: INTERNAL MEDICINE

## 2022-05-09 PROCEDURE — 36415 COLL VENOUS BLD VENIPUNCTURE: CPT | Performed by: INTERNAL MEDICINE

## 2022-05-09 PROCEDURE — 93454 CORONARY ARTERY ANGIO S&I: CPT | Performed by: INTERNAL MEDICINE

## 2022-05-09 PROCEDURE — 85610 PROTHROMBIN TIME: CPT | Performed by: INTERNAL MEDICINE

## 2022-05-09 RX ORDER — ASPIRIN 81 MG/1
243 TABLET, CHEWABLE ORAL ONCE
Status: DISCONTINUED | OUTPATIENT
Start: 2022-05-09 | End: 2022-05-09 | Stop reason: HOSPADM

## 2022-05-09 RX ORDER — NALOXONE HYDROCHLORIDE 0.4 MG/ML
0.2 INJECTION, SOLUTION INTRAMUSCULAR; INTRAVENOUS; SUBCUTANEOUS
Status: DISCONTINUED | OUTPATIENT
Start: 2022-05-09 | End: 2022-05-09 | Stop reason: HOSPADM

## 2022-05-09 RX ORDER — ASPIRIN 325 MG
325 TABLET ORAL ONCE
Status: DISCONTINUED | OUTPATIENT
Start: 2022-05-09 | End: 2022-05-09 | Stop reason: HOSPADM

## 2022-05-09 RX ORDER — FENTANYL CITRATE 50 UG/ML
25 INJECTION, SOLUTION INTRAMUSCULAR; INTRAVENOUS
Status: DISCONTINUED | OUTPATIENT
Start: 2022-05-09 | End: 2022-05-09 | Stop reason: HOSPADM

## 2022-05-09 RX ORDER — FENTANYL CITRATE 50 UG/ML
INJECTION, SOLUTION INTRAMUSCULAR; INTRAVENOUS
Status: DISCONTINUED | OUTPATIENT
Start: 2022-05-09 | End: 2022-05-09 | Stop reason: HOSPADM

## 2022-05-09 RX ORDER — NALOXONE HYDROCHLORIDE 0.4 MG/ML
0.4 INJECTION, SOLUTION INTRAMUSCULAR; INTRAVENOUS; SUBCUTANEOUS
Status: DISCONTINUED | OUTPATIENT
Start: 2022-05-09 | End: 2022-05-09 | Stop reason: HOSPADM

## 2022-05-09 RX ORDER — SODIUM CHLORIDE 9 MG/ML
INJECTION, SOLUTION INTRAVENOUS CONTINUOUS
Status: DISCONTINUED | OUTPATIENT
Start: 2022-05-09 | End: 2022-05-09 | Stop reason: HOSPADM

## 2022-05-09 RX ORDER — HEPARIN SODIUM 1000 [USP'U]/ML
INJECTION, SOLUTION INTRAVENOUS; SUBCUTANEOUS
Status: DISCONTINUED | OUTPATIENT
Start: 2022-05-09 | End: 2022-05-09 | Stop reason: HOSPADM

## 2022-05-09 RX ORDER — LIDOCAINE 40 MG/G
CREAM TOPICAL
Status: DISCONTINUED | OUTPATIENT
Start: 2022-05-09 | End: 2022-05-09 | Stop reason: HOSPADM

## 2022-05-09 RX ORDER — FLUMAZENIL 0.1 MG/ML
0.2 INJECTION, SOLUTION INTRAVENOUS
Status: DISCONTINUED | OUTPATIENT
Start: 2022-05-09 | End: 2022-05-09 | Stop reason: HOSPADM

## 2022-05-09 RX ORDER — VERAPAMIL HYDROCHLORIDE 2.5 MG/ML
INJECTION, SOLUTION INTRAVENOUS
Status: DISCONTINUED | OUTPATIENT
Start: 2022-05-09 | End: 2022-05-09 | Stop reason: HOSPADM

## 2022-05-09 RX ORDER — ATROPINE SULFATE 0.1 MG/ML
0.5 INJECTION INTRAVENOUS
Status: DISCONTINUED | OUTPATIENT
Start: 2022-05-09 | End: 2022-05-09 | Stop reason: HOSPADM

## 2022-05-09 RX ORDER — ACETAMINOPHEN 325 MG/1
650 TABLET ORAL EVERY 4 HOURS PRN
Status: DISCONTINUED | OUTPATIENT
Start: 2022-05-09 | End: 2022-05-09 | Stop reason: HOSPADM

## 2022-05-09 RX ORDER — POTASSIUM CHLORIDE 1500 MG/1
20 TABLET, EXTENDED RELEASE ORAL
Status: COMPLETED | OUTPATIENT
Start: 2022-05-09 | End: 2022-05-09

## 2022-05-09 RX ORDER — IOPAMIDOL 755 MG/ML
INJECTION, SOLUTION INTRAVASCULAR
Status: DISCONTINUED | OUTPATIENT
Start: 2022-05-09 | End: 2022-05-09 | Stop reason: HOSPADM

## 2022-05-09 RX ADMIN — SODIUM CHLORIDE: 9 INJECTION, SOLUTION INTRAVENOUS at 09:37

## 2022-05-09 RX ADMIN — POTASSIUM CHLORIDE 20 MEQ: 1500 TABLET, EXTENDED RELEASE ORAL at 10:16

## 2022-05-09 NOTE — PRE-PROCEDURE
GENERAL PRE-PROCEDURE:   Procedure:  Cor angio possible PCI  Date/Time:  5/9/2022 11:09 AM    Written consent obtained?: Yes    Risks and benefits: Risks, benefits and alternatives were discussed    Consent given by:  Patient  Patient states understanding of procedure being performed: Yes    Patient's understanding of procedure matches consent: Yes    Procedure consent matches procedure scheduled: Yes    Expected level of sedation:  Moderate  Appropriately NPO:  Yes  ASA Class:  4  Mallampati  :  Grade 1- soft palate, uvula, tonsillar pillars, and posterior pharyngeal wall visible  Lungs:  Lungs clear with good breath sounds bilaterally  Heart:  Normal heart sounds and rate and systolic murmur  History & Physical reviewed:  History and physical reviewed and no updates needed  Statement of review:  I have reviewed the lab findings, diagnostic data, medications, and the plan for sedation

## 2022-05-09 NOTE — PROGRESS NOTES
Care Suites Discharge Nursing Note    Patient Information  Name: Heather Bauer  Age: 67 year old    Discharge Education:  Discharge instructions reviewed: Yes  Additional education/resources provided: N/A    Patient/patient representative verbalizes understanding: Yes  Patient discharging on new medications: No  Medication education completed: N/A    Discharge Plans:   Discharge location: home  Discharge ride contacted: Yes  Approximate discharge time: 1510    Discharge Criteria:  Discharge criteria met and vital signs stable: Yes    Patient Belongs:  Patient belongings returned to patient: Yes    Jessica Pierre RN

## 2022-05-09 NOTE — PROGRESS NOTES
PATIENT/VISITOR WELLNESS SCREENING    Step 1 Patient Screening    1. In the last month, have you been in contact with someone who was confirmed or suspected to have Coronavirus/COVID-19? Yes, spouse    2. Do you have the following symptoms?  Fever/Chills? No   Cough? No   Shortness of breath? No   New loss of taste or smell? No  Sore throat? No  Muscle or body aches? No  Headaches? No  Fatigue? No  Vomiting or diarrhea? No    Step 2 Visitor Screening    1. Name of Visitor (1 visitor per patient): Minh, spouse    2. In the last month, have you been in contact with someone who was confirmed or suspected to have Coronavirus/COVID-19? Yes: was Covid +    3. Do you have the following symptoms?  Fever/Chills? No   Cough? No   Shortness of breath? No   Skin rash? No   Loss of taste or smell? No  Sore throat? No  Runny or stuffy nose? No  Muscle or body aches? No  Headaches? No  Fatigue? No  Vomiting or diarrhea? No    If the visitor has positive symptoms, notify supervisor/manger  Per policy, the visitor will need to leave the facility     Step 3 Refer to logic grid below for actions    NO SYMPTOM(S)    ACTIONS:  1. Standard rooming process  2. Provider to assess per normal protocol  3. Implement precautions as needed and per guidelines     POSITIVE SYMPTOM(S)  If positive for ANY of the following symptoms: fever, cough, shortness of breath, rash    ACTION:  1. Continue to have the patient wear a mask   2. Room patient as soon as possible  3. Don appropriate PPE when entering room  4. Provider evaluation

## 2022-05-09 NOTE — PROGRESS NOTES
Care Suites Admission Nursing Note    Patient Information  Name: Heather Bauer  Age: 67 year old  Reason for admission: Angio  Care Suites arrival time: 0900    Visitor Information  Name: Minh, spouse  Informed of visitor restrictions: Yes  1 visitor allowed per patient   Visitor must screen negative for COVID symptoms   Visitor must wear a mask  Waiting rooms closed to visitors    Patient Admission/Assessment   Pre-procedure assessment complete: Yes  If abnormal assessment/labs, provider notified: N/A  NPO: Yes  Medications held per instructions/orders: No, explain took ASA this AM  Consent: deferred  If applicable, pregnancy test status: deferred  Patient oriented to room: Yes  Education/questions answered: Yes  Plan/other: A waiting procedure    Discharge Planning  Discharge name/phone number: Minh, [phone]  Overnight post sedation caregiver: Minh  Discharge location: [place]    Jessica Pierre RN

## 2022-05-09 NOTE — DISCHARGE INSTRUCTIONS
Cardiac Angiogram Discharge Instructions - Radial    After you go home:    Have an adult stay with you until tomorrow.  Drink extra fluids for 2 days.  You may resume your normal diet.  No smoking       For 24 hours - due to the sedation you received:  Relax and take it easy.  Do NOT make any important or legal decisions.  Do NOT drive or operate machines at home or at work.  Do NOT drink alcohol.    Care of Wrist Puncture Site:    For the first 24 hrs - check the puncture site every 1-2 hours while awake.  It is normal to have soreness at the puncture site and mild tingling in your hand for up to 3 days.  Remove the bandaid after 24 hours. If there is minor oozing, apply another bandaid and remove it after 12 hours.  You may shower tomorrow.  Do NOT take a bath, or use a hot tub or pool for at least 3 days. Do NOT scrub the site. Do not use lotion or powder near the puncture site.           Activity:        For 2 days:   do not use your hand or arm to support your weight (such as rising from a chair)   do not bend your wrist (such as lifting a garage door).  do not lift more than 5 pounds or exercise your arm (such as tennis, golf or bowling).  Do NOT do any heavy activity such as exercise, lifting, or straining.     Bleeding:    If you start bleeding from the site in your wrist, sit down and press firmly on/above the site for 10 minutes.   Once bleeding stops, keep arm still for 2 hours.   Call [facility] as soon as you can.       Call 911 right away if you have heavy bleeding or bleeding that does not stop.      Medicines:               If you have stopped any medicines, you may resume your home medications.    Follow Up Appointments:    Follow up with [facility] Nurse Practitioner at [facility] of patient preference in 7-10 days.    Call the clinic if:    You have a large or growing hard lump around the site.  The site is red, swollen, hot or tender.  Blood or fluid is draining from the site.  You have  chills or a fever greater than 101 F (38 C).  Your arm feels numb, cool or changes color.  You have hives, a rash or unusual itching.  Any questions or concerns.          [facility]:    [phone] [facility]MP (7 days a week)

## 2022-05-09 NOTE — PROGRESS NOTES
Care Suites Post Procedure Note    Patient Information  Name: Heather Bauer  Age: 67 year old    Post Procedure  Time patient returned to Care Suites: 11:45 am  Concerns/abnormal assessment: None  Plan/Other: Continue to monitor patient.  Remove air from TR band per protocol.  Review discharge instructions with patient.  Discharge at approximately 15:00.

## 2022-05-10 ENCOUNTER — TELEPHONE (OUTPATIENT)
Dept: CARDIOLOGY | Facility: CLINIC | Age: 67
End: 2022-05-10
Payer: COMMERCIAL

## 2022-05-10 NOTE — TELEPHONE ENCOUNTER
Called patient to follow-up post angiogram yesterday, 05/09/22. Patient stated she is feeling well and denied any pain or bleeding at radial access site.    Additionally got patient scheduled for in-person visit with Dr. Palmer on 05/16/2022 as part of TAVR workup. Patient will be presented at valve conference on 05/12/2022 so informed patient I will call her following conference to update her regarding the teams recommendation for next steps. Patient stated she understood.      [Fatigue] : fatigue [Dry Eyes] : dryness of the eyes [Vaginal Discharge] : vaginal discharge [Dysmenorrhea/Abn Vaginal Bleeding] : dysmenorrhea/abnormal vaginal bleeding [Joint Pain] : joint pain [Muscle Pain] : muscle pain [Skin Rash] : skin rash [Negative] : Endocrine [Fever] : no fever [Chills] : no chills [Night Sweats] : no night sweats [Recent Change In Weight] : ~T no recent weight change [Loss of Hearing] : no loss of hearing [Urinary Frequency] : no change in urinary frequency [Muscle Weakness] : no muscle weakness [Nocturia] : no nocturia [Gait Disturbance] : no gait disturbance [Confused] : no confusion [Dizziness] : no dizziness [Fainting] : no fainting [Difficulty Walking] : no difficulty walking [Suicidal] : not suicidal [Insomnia] : no insomnia [Anxiety] : no anxiety [Depression] : no depression [FreeTextEntry8] : being treated currently for UTI / vaginal spotting  [FreeTextEntry9] : b/l legs back muscle pain - chronic for years [de-identified] : itchy - months - cream appplied

## 2022-05-11 LAB
ATRIAL RATE - MUSE: 63 BPM
DIASTOLIC BLOOD PRESSURE - MUSE: NORMAL MMHG
INTERPRETATION ECG - MUSE: NORMAL
P AXIS - MUSE: 61 DEGREES
PR INTERVAL - MUSE: 154 MS
QRS DURATION - MUSE: 76 MS
QT - MUSE: 400 MS
QTC - MUSE: 409 MS
R AXIS - MUSE: 67 DEGREES
SYSTOLIC BLOOD PRESSURE - MUSE: NORMAL MMHG
T AXIS - MUSE: 69 DEGREES
VENTRICULAR RATE- MUSE: 63 BPM

## 2022-05-11 NOTE — TELEPHONE ENCOUNTER
Called patient and changed scheduled appointment with Dr. Palmer on 05/16/2022 to an appointment with Dr. Billingsley tomorrow, 05/12/2022 at 02:15pm as Dr. Palmer is unavailable on 05/16/2022.    Patient verbalized understanding and stated she does believe she could come tomorrow.

## 2022-05-12 ENCOUNTER — OFFICE VISIT (OUTPATIENT)
Dept: CARDIOLOGY | Facility: CLINIC | Age: 67
End: 2022-05-12
Payer: COMMERCIAL

## 2022-05-12 ENCOUNTER — TELEPHONE (OUTPATIENT)
Dept: CARDIOLOGY | Facility: CLINIC | Age: 67
End: 2022-05-12
Payer: COMMERCIAL

## 2022-05-12 VITALS
WEIGHT: 219.1 LBS | HEIGHT: 68 IN | SYSTOLIC BLOOD PRESSURE: 106 MMHG | OXYGEN SATURATION: 100 % | HEART RATE: 66 BPM | DIASTOLIC BLOOD PRESSURE: 71 MMHG | BODY MASS INDEX: 33.21 KG/M2

## 2022-05-12 DIAGNOSIS — I35.0 NONRHEUMATIC AORTIC VALVE STENOSIS: Primary | ICD-10-CM

## 2022-05-12 PROCEDURE — 99207 PR NON-BILLABLE SERV PER CHARTING: CPT | Performed by: SURGERY

## 2022-05-12 NOTE — TELEPHONE ENCOUNTER
Patient was presented this morning at the multidisciplinary valve conference. Plan is for patient to see Dr. Billingsley with CV Surgery today to review findings and case as patient was considered a candidate for both TAVR and SAVR in today's conference.     Will upload CT TAVR images to [facility] per request of Dr. Rick and will revisit patients case with Dr. Rick and structural team following office visit today.    Above information was called out to the patient.     Joan Sanabria RN  Structural Heart Coordinator  [facility] Heart [facility]

## 2022-05-16 ENCOUNTER — MYC MEDICAL ADVICE (OUTPATIENT)
Dept: CARDIOLOGY | Facility: CLINIC | Age: 67
End: 2022-05-16
Payer: COMMERCIAL

## 2022-05-19 ENCOUNTER — TELEPHONE (OUTPATIENT)
Dept: CARDIOLOGY | Facility: CLINIC | Age: 67
End: 2022-05-19
Payer: COMMERCIAL

## 2022-05-19 DIAGNOSIS — R06.09 DOE (DYSPNEA ON EXERTION): Primary | ICD-10-CM

## 2022-05-19 DIAGNOSIS — I35.0 NONRHEUMATIC AORTIC VALVE STENOSIS: ICD-10-CM

## 2022-05-19 NOTE — TELEPHONE ENCOUNTER
Telephoned patient after phone call with Dr. Rick. She states she is still thinking about TAVR vs SAVR. She states he did answer all her questions but she is still undecided. She will call back in the next couple days after thinking about it some more.    Saray Talamantes, RN  Structural Heart Coordinator  [facility]

## 2022-05-20 NOTE — TELEPHONE ENCOUNTER
Received call from patient to state that she has decided on TAVR. Would like to schedule as soon as possible with Dr. Rick. Next availability 6/28/22 and patient states she cannot wait that long as she is getting more short of breath. Will review with Dr. Rick and call her back.    Reviewed with Dr. Rick, he will not be available for TAVR 6/28. His next day will be 7/19/22. Understands if patient does not want to wait.    Spoke with Heather, she would like to proceed as soon as possible. Will schedule 5/31/22 with Dr. Garay. Will need pre-op appt with labs, working to arrange 5/27/22. Pt is available. Will call back on Monday with appt date and time info.    Saray Talamantes, RN  Structural Heart Coordinator  [facility]

## 2022-05-23 NOTE — TELEPHONE ENCOUNTER
Telephoned patient to review pre-op TAVR appts.     Future Appointments   Date Time Provider Department Center   5/26/2022  1:20 PM  LAB ONLY [facility]   5/26/2022  2:00 PM Geneva Maurer PA-C [facility]     Pre-procedure COVID test not required as patient is within 90 days of positive result (4/25/22).    Patient verbalized understanding of date, time and location of appts.    Saray Talamantes RN  Structural Heart Coordinator  [facility]

## 2022-05-25 NOTE — PATIENT INSTRUCTIONS
TAVR PRE-PROCEDURE INSTRUCTIONS:    - Your TAVR is scheduled Tuesday 05/31/2022, 1st case. Please check-in at 0530AM at the Registration Desk in the [facility] Lobby at [facility].    - Use the Hibiclens soap I have provided you the night before and morning of the procedure. Use from chin to toes, please avoid your face and eyes.    - Nothing to eat or drink the morning of your TAVR.     Medication instructions:  - You make take your morning medications with sips of water.   - Hold your furosemide (Lasix), lisinopril, and potassium on the morning of your TAVR.  - Please hold all vitamins and supplements the morning of your procedure.  - You may continue taking 81mg aspirin daily, including on the morning of the procedure.    - COVID-19 restrictions: Pre-Op covid test is not required as patient has tested positive in the past 90 days (04/26/22). On the day of the procedure, you may have one visitor in the pre-operative area. Patient and visitor must wear face masks. Two visitors may see you when you get to your room in the Heart Center (2nd floor of [facility]).    - You will stay overnight on Tuesday night. We will monitor you overnight for signs of bleeding, stroke, as well as need for pacemaker. On Wednesday morning, you will have an echo of your new valve and work with cardiac rehab.     It was nice to see you today! Please call with any other questions, concerns, or any changes in your health: [phone].    Joan Sanabria RN  Structural Heart Coordinator  [facility]

## 2022-05-26 ENCOUNTER — LAB (OUTPATIENT)
Dept: LAB | Facility: CLINIC | Age: 67
End: 2022-05-26
Payer: COMMERCIAL

## 2022-05-26 ENCOUNTER — OFFICE VISIT (OUTPATIENT)
Dept: CARDIOLOGY | Facility: CLINIC | Age: 67
End: 2022-05-26
Payer: COMMERCIAL

## 2022-05-26 VITALS
WEIGHT: 217 LBS | HEART RATE: 66 BPM | SYSTOLIC BLOOD PRESSURE: 116 MMHG | BODY MASS INDEX: 32.89 KG/M2 | OXYGEN SATURATION: 99 % | HEIGHT: 68 IN | DIASTOLIC BLOOD PRESSURE: 78 MMHG

## 2022-05-26 DIAGNOSIS — E78.00 HYPERCHOLESTEREMIA: ICD-10-CM

## 2022-05-26 DIAGNOSIS — I10 ESSENTIAL HYPERTENSION, BENIGN: ICD-10-CM

## 2022-05-26 DIAGNOSIS — R06.09 DOE (DYSPNEA ON EXERTION): ICD-10-CM

## 2022-05-26 DIAGNOSIS — I35.0 NONRHEUMATIC AORTIC VALVE STENOSIS: ICD-10-CM

## 2022-05-26 DIAGNOSIS — I35.0 NONRHEUMATIC AORTIC VALVE STENOSIS: Primary | ICD-10-CM

## 2022-05-26 DIAGNOSIS — I25.10 CORONARY ARTERY DISEASE INVOLVING NATIVE CORONARY ARTERY OF NATIVE HEART WITHOUT ANGINA PECTORIS: ICD-10-CM

## 2022-05-26 LAB
ABO/RH(D): NORMAL
ALBUMIN SERPL-MCNC: 3.9 G/DL (ref 3.4–5)
ALP SERPL-CCNC: 102 U/L (ref 40–150)
ALT SERPL W P-5'-P-CCNC: 26 U/L (ref 0–50)
ANION GAP SERPL CALCULATED.3IONS-SCNC: 5 MMOL/L (ref 3–14)
ANTIBODY SCREEN: NEGATIVE
AST SERPL W P-5'-P-CCNC: 18 U/L (ref 0–45)
BILIRUB SERPL-MCNC: 0.7 MG/DL (ref 0.2–1.3)
BUN SERPL-MCNC: 20 MG/DL (ref 7–30)
CALCIUM SERPL-MCNC: 9.8 MG/DL (ref 8.5–10.1)
CHLORIDE BLD-SCNC: 101 MMOL/L (ref 94–109)
CO2 SERPL-SCNC: 30 MMOL/L (ref 20–32)
CREAT SERPL-MCNC: 0.9 MG/DL (ref 0.52–1.04)
ERYTHROCYTE [DISTWIDTH] IN BLOOD BY AUTOMATED COUNT: 12.7 % (ref 10–15)
GFR SERPL CREATININE-BSD FRML MDRD: 71 ML/MIN/1.73M2
GLUCOSE BLD-MCNC: 97 MG/DL (ref 70–99)
HCT VFR BLD AUTO: 41 % (ref 35–47)
HGB BLD-MCNC: 13.9 G/DL (ref 11.7–15.7)
INR PPP: 1.06 (ref 0.85–1.15)
MCH RBC QN AUTO: 32.1 PG (ref 26.5–33)
MCHC RBC AUTO-ENTMCNC: 33.9 G/DL (ref 31.5–36.5)
MCV RBC AUTO: 95 FL (ref 78–100)
NT-PROBNP SERPL-MCNC: 492 PG/ML (ref 0–900)
PLATELET # BLD AUTO: 279 10E3/UL (ref 150–450)
POTASSIUM BLD-SCNC: 4 MMOL/L (ref 3.4–5.3)
PROT SERPL-MCNC: 7.5 G/DL (ref 6.8–8.8)
RBC # BLD AUTO: 4.33 10E6/UL (ref 3.8–5.2)
SODIUM SERPL-SCNC: 136 MMOL/L (ref 133–144)
SPECIMEN EXPIRATION DATE: NORMAL
WBC # BLD AUTO: 6.5 10E3/UL (ref 4–11)

## 2022-05-26 PROCEDURE — 83880 ASSAY OF NATRIURETIC PEPTIDE: CPT

## 2022-05-26 PROCEDURE — 85610 PROTHROMBIN TIME: CPT

## 2022-05-26 PROCEDURE — 85014 HEMATOCRIT: CPT

## 2022-05-26 PROCEDURE — 93005 ELECTROCARDIOGRAM TRACING: CPT | Performed by: PHYSICIAN ASSISTANT

## 2022-05-26 PROCEDURE — 36415 COLL VENOUS BLD VENIPUNCTURE: CPT

## 2022-05-26 PROCEDURE — 80053 COMPREHEN METABOLIC PANEL: CPT

## 2022-05-26 PROCEDURE — 99214 OFFICE O/P EST MOD 30 MIN: CPT | Performed by: PHYSICIAN ASSISTANT

## 2022-05-26 PROCEDURE — 86901 BLOOD TYPING SEROLOGIC RH(D): CPT

## 2022-05-26 RX ORDER — LISINOPRIL 10 MG/1
10 TABLET ORAL DAILY
Qty: 90 TABLET | Refills: 2 | Status: ON HOLD | OUTPATIENT
Start: 2022-05-26 | End: 2022-06-01

## 2022-05-26 RX ORDER — POTASSIUM CHLORIDE 1500 MG/1
20 TABLET, EXTENDED RELEASE ORAL DAILY
Qty: 90 TABLET | Refills: 2 | Status: ON HOLD | OUTPATIENT
Start: 2022-05-26 | End: 2022-06-01

## 2022-05-26 RX ORDER — FUROSEMIDE 40 MG
40 TABLET ORAL DAILY
Qty: 90 TABLET | Refills: 2 | Status: SHIPPED | OUTPATIENT
Start: 2022-05-26 | End: 2022-06-01

## 2022-05-26 NOTE — H&P (VIEW-ONLY)
[facility]  H&P    Primary Cardiologist: Dr. Dave    History of Present Illness  Heather is a pleasant 67 year old female who presents for pre-operative H&P in preparation for a TAVR on 5/31/2022 at [facility].     Her past medical history notable for severe symptomatic aortic stenosis (echocardiogram in 2/2022 showed mean aortic gradient of 50 mmHg in the setting of preserved LVEF), CAD (history of PCI to LAD in 2019), hypertension, and hyperlipidemia.    Pre-TAVR coronary angiogram showed no new lesions.  She was continued on aspirin 81 mg daily.  She was seen by CV surgery team and felt to be a good candidate for either surgery or TAVR.  After some discussion patient elected to proceed with TAVR.    History of blood transfusions/reactions: No  History of abnormal bleeding/anti-platelet use: No  Steroid use in the last year: No  Personal or family history with difficulty with anesthesia: No     Encounter Diagnoses   Name Primary?     Nonrheumatic aortic valve stenosis Yes     BORJA (dyspnea on exertion)      Coronary artery disease involving native coronary artery of native heart without angina pectoris      Hypercholesteremia      Essential hypertension, benign        Current Medications:  Current Outpatient Medications   Medication Sig Dispense Refill     aspirin 81 MG EC tablet Take 81 mg by mouth daily       atorvastatin (LIPITOR) 40 MG tablet Take 1 tablet (40 mg) by mouth daily 90 tablet 1     furosemide (LASIX) 40 MG tablet Take 1 tablet (40 mg) by mouth daily 90 tablet 2     lisinopril (ZESTRIL) 10 MG tablet Take 1 tablet (10 mg) by mouth daily 90 tablet 2     metoprolol succinate ER (TOPROL XL) 25 MG 24 hr tablet Take 0.5 tablets (12.5 mg) by mouth daily 45 tablet 1     MULTIPLE MINERALS-VITAMINS OR TABS        nitroGLYcerin (NITROSTAT) 0.4 MG sublingual tablet One tablet under the tongue every 5 minutes if needed for chest pain. May repeat every 5 minutes for a maximum  "of 3 doses in 15 minutes\" 25 tablet 3     potassium chloride ER (KLOR-CON M) 20 MEQ CR tablet Take 1 tablet (20 mEq) by mouth daily 90 tablet 2       Allergies:     Allergies   Allergen Reactions     No Known Drug Allergies        Past Medical History:  Past Medical History:   Diagnosis Date     Aortic valve stenosis      Essential hypertension, benign      Fracture, tibia, shaft      History of colposcopy with cervical biopsy 2/22/11    AMRITA I     Papanicolaou smear of vagina with atypical squamous cells cannot exclude high grade squamous intraepithelial lesion (ASC-H) 1/31/11       Past Surgical History:  Past Surgical History:   Procedure Laterality Date     CV CORONARY ANGIOGRAM N/A 3/11/2019    Procedure: Coronary Angiogram;  Surgeon: Adolph Dave MD;  Location: [facility] CARDIAC CATH LAB     CV CORONARY ANGIOGRAM N/A 5/9/2022    Procedure: Coronary Angiogram;  Surgeon: Madonna Garay MD;  Location: [facility] CARDIAC CATH LAB     CV PCI N/A 5/9/2022    Procedure: Percutaneous Coronary Intervention;  Surgeon: Madonna Garay MD;  Location: [facility] CARDIAC CATH LAB     CV PCI ANGIOPLASTY N/A 3/11/2019    Procedure: Percutaneous Coronary Intervention;  Surgeon: Adolph Dave MD;  Location: [facility] CARDIAC CATH LAB     ZZC APPENDECTOMY         Family History:  Family History   Problem Relation Age of Onset     C.A.D. Mother         early 60's     Lipids Mother      Heart Disease Mother 68        heart bypass surgery     Hypertension Mother      Thyroid Disease Mother      Heart Disease Father         valvular disease     Thyroid Disease Paternal Aunt      Heart Disease Maternal Grandmother         heart attack       Social History:  Social History     Socioeconomic History     Marital status:      Spouse name: None     Number of children: 2     Years of education: None     Highest education level: None   Occupational History     Employer: [employer]   Tobacco Use     Smoking status: Former " "Smoker     Smokeless tobacco: Never Used     Tobacco comment: quit 20 years ago   Vaping Use     Vaping Use: Never used   Substance and Sexual Activity     Alcohol use: Yes     Comment: wine every week     Drug use: No     Sexual activity: Yes     Birth control/protection: Condom   Other Topics Concern     Exercise Yes     Seat Belt Yes       Review of Systems:  Constitutional: No fever, chills, or sweats. No weight gain/loss   ENT: No visual disturbance, ear ache, epistaxis, sore throat  Allergies/Immunologic: Negative.   Respiratory: No cough, hemoptysia  Cardiovascular: As per HPI  GI: No nausea, vomiting, hematemesis, melena, or hematochezia  : No urinary frequency, dysuria, or hematuria  Integument: Negative  Psychiatric: Negative  Neuro: Negative  Endocrinology: Negative   Musculoskeletal: Negative      Physical Exam:  Vitals: /78   Pulse 66   Ht 1.727 m (5' 8\")   Wt 98.4 kg (217 lb)   LMP 07/07/2008   SpO2 99%   BMI 32.99 kg/m       General: NAD  HEENT:  Dentition intact.    Neck: No jugular venous distension.   Heart: RRR with 3/6 ZAYDA at RUSB with radiation to carotid arteries.  Lungs: CTA, lloyd.    Abdomen: Soft, nontender, nondistended.   Extremities: No clubbing, cyanosis, or edema.  The pulses are +2/4 at the radial and PT pulses bilaterally.  Neurologic: Alert and oriented to person/place/time, normal speech, gait and affect  Skin: No petechiae, purpura or rash.      Laboratory Studies:  LIPID RESULTS:  Lab Results   Component Value Date    CHOL 136 03/15/2022    CHOL 135 01/21/2021    HDL 81 03/15/2022    HDL 75 01/21/2021    LDL 43 03/15/2022    LDL 48 01/21/2021    TRIG 61 03/15/2022    TRIG 59 01/21/2021    CHOLHDLRATIO 3.3 01/31/2011       LIVER ENZYME RESULTS:  Lab Results   Component Value Date    AST 18 05/26/2022    AST 18 03/11/2019    ALT 26 05/26/2022    ALT 34 06/19/2019       CBC RESULTS:  Lab Results   Component Value Date    WBC 6.5 05/26/2022    WBC 6.0 03/11/2019    RBC " 4.33 05/26/2022    RBC 4.43 03/11/2019    HGB 13.9 05/26/2022    HGB 14.4 03/11/2019    HCT 41.0 05/26/2022    HCT 42.0 03/11/2019    MCV 95 05/26/2022    MCV 95 03/11/2019    MCH 32.1 05/26/2022    MCH 32.5 03/11/2019    MCHC 33.9 05/26/2022    MCHC 34.3 03/11/2019    RDW 12.7 05/26/2022    RDW 12.4 03/11/2019     05/26/2022     03/11/2019       BMP RESULTS:  Lab Results   Component Value Date     05/26/2022     01/21/2021    POTASSIUM 4.0 05/26/2022    POTASSIUM 4.2 01/21/2021    CHLORIDE 101 05/26/2022    CHLORIDE 106 01/21/2021    CO2 30 05/26/2022    CO2 33 (H) 01/21/2021    ANIONGAP 5 05/26/2022    ANIONGAP 1 (L) 01/21/2021    GLC 97 05/26/2022    GLC 95 01/21/2021    BUN 20 05/26/2022    BUN 19 01/21/2021    CR 0.90 05/26/2022    CR 0.82 01/21/2021    GFRESTIMATED 71 05/26/2022    GFRESTIMATED 76 01/21/2021    GFRESTBLACK 88 01/21/2021    RALPH 9.8 05/26/2022    RALPH 9.7 01/21/2021        A1C RESULTS:  Lab Results   Component Value Date    A1C 5.7 (H) 03/11/2019       INR RESULTS:  Lab Results   Component Value Date    INR 1.06 05/26/2022    INR 1.05 05/09/2022    INR 0.96 03/11/2019       Assessment and Plan:  Heather is a pleasant 67 year old female who presents for pre-operative H&P in preparation for a TAVR on 5/31/2022 at [facility].     Her past medical history notable for severe symptomatic aortic stenosis (echocardiogram in 2/2022 showed mean aortic gradient of 50 mmHg in the setting of preserved LVEF), CAD (history of PCI to LAD in 2019), hypertension, and hyperlipidemia.    Type and screen orders completed. Supplies for scrubbing provided.     Patient has no fever, chills, or urinary symptoms. Patient does not have contrast dye allergy. The risks of TAVR is discussed with patient which included risk of stroke (3-4%), needing permanent pacemaker (10%), permanent renal injury, anaphylactic reaction to contrast dye, emergent complication requiring conversion to  open heart surgery (0.5-1%), embolization of the valve and/or perforation of the ventricular wall (0.5%). She will be NPO starting midnight prior to the procedure.    Patient is optimized and is acceptable candidate for the proposed procedure.  No further diagnostic evaluation is needed. Pre-TAVR instructions provided in written format.     Medication Recommendations:  Continue with aspirin 81 mg daily  Hold lisinopril, potassium, and lasix on the day of the procedure    This note was completed in part using Dragon voice recognition software. Although reviewed after completion, some word and grammatical errors may occur.    Geneva Maurer PA-C   5/26/2022  Pager: [phone]

## 2022-05-26 NOTE — PROGRESS NOTES
[facility]  H&P    Primary Cardiologist: Dr. Dave    History of Present Illness  Heather is a pleasant 67 year old female who presents for pre-operative H&P in preparation for a TAVR on 5/31/2022 at [facility].     Her past medical history notable for severe symptomatic aortic stenosis (echocardiogram in 2/2022 showed mean aortic gradient of 50 mmHg in the setting of preserved LVEF), CAD (history of PCI to LAD in 2019), hypertension, and hyperlipidemia.    Pre-TAVR coronary angiogram showed no new lesions.  She was continued on aspirin 81 mg daily.  She was seen by CV surgery team and felt to be a good candidate for either surgery or TAVR.  After some discussion patient elected to proceed with TAVR.    History of blood transfusions/reactions: No  History of abnormal bleeding/anti-platelet use: No  Steroid use in the last year: No  Personal or family history with difficulty with anesthesia: No     Encounter Diagnoses   Name Primary?     Nonrheumatic aortic valve stenosis Yes     BORJA (dyspnea on exertion)      Coronary artery disease involving native coronary artery of native heart without angina pectoris      Hypercholesteremia      Essential hypertension, benign        Current Medications:  Current Outpatient Medications   Medication Sig Dispense Refill     aspirin 81 MG EC tablet Take 81 mg by mouth daily       atorvastatin (LIPITOR) 40 MG tablet Take 1 tablet (40 mg) by mouth daily 90 tablet 1     furosemide (LASIX) 40 MG tablet Take 1 tablet (40 mg) by mouth daily 90 tablet 2     lisinopril (ZESTRIL) 10 MG tablet Take 1 tablet (10 mg) by mouth daily 90 tablet 2     metoprolol succinate ER (TOPROL XL) 25 MG 24 hr tablet Take 0.5 tablets (12.5 mg) by mouth daily 45 tablet 1     MULTIPLE MINERALS-VITAMINS OR TABS        nitroGLYcerin (NITROSTAT) 0.4 MG sublingual tablet One tablet under the tongue every 5 minutes if needed for chest pain. May repeat every 5 minutes for a maximum  "of 3 doses in 15 minutes\" 25 tablet 3     potassium chloride ER (KLOR-CON M) 20 MEQ CR tablet Take 1 tablet (20 mEq) by mouth daily 90 tablet 2       Allergies:     Allergies   Allergen Reactions     No Known Drug Allergies        Past Medical History:  Past Medical History:   Diagnosis Date     Aortic valve stenosis      Essential hypertension, benign      Fracture, tibia, shaft      History of colposcopy with cervical biopsy 2/22/11    AMRITA I     Papanicolaou smear of vagina with atypical squamous cells cannot exclude high grade squamous intraepithelial lesion (ASC-H) 1/31/11       Past Surgical History:  Past Surgical History:   Procedure Laterality Date     CV CORONARY ANGIOGRAM N/A 3/11/2019    Procedure: Coronary Angiogram;  Surgeon: Adolph Dave MD;  Location: [facility] CARDIAC CATH LAB     CV CORONARY ANGIOGRAM N/A 5/9/2022    Procedure: Coronary Angiogram;  Surgeon: Madonna Garay MD;  Location: [facility] CARDIAC CATH LAB     CV PCI N/A 5/9/2022    Procedure: Percutaneous Coronary Intervention;  Surgeon: Madonna Garay MD;  Location: [facility] CARDIAC CATH LAB     CV PCI ANGIOPLASTY N/A 3/11/2019    Procedure: Percutaneous Coronary Intervention;  Surgeon: Adolph Dave MD;  Location: [facility] CARDIAC CATH LAB     ZZC APPENDECTOMY         Family History:  Family History   Problem Relation Age of Onset     C.A.D. Mother         early 60's     Lipids Mother      Heart Disease Mother 68        heart bypass surgery     Hypertension Mother      Thyroid Disease Mother      Heart Disease Father         valvular disease     Thyroid Disease Paternal Aunt      Heart Disease Maternal Grandmother         heart attack       Social History:  Social History     Socioeconomic History     Marital status:      Spouse name: None     Number of children: 2     Years of education: None     Highest education level: None   Occupational History     Employer: [employer]   Tobacco Use     Smoking status: Former " "Smoker     Smokeless tobacco: Never Used     Tobacco comment: quit 20 years ago   Vaping Use     Vaping Use: Never used   Substance and Sexual Activity     Alcohol use: Yes     Comment: wine every week     Drug use: No     Sexual activity: Yes     Birth control/protection: Condom   Other Topics Concern     Exercise Yes     Seat Belt Yes       Review of Systems:  Constitutional: No fever, chills, or sweats. No weight gain/loss   ENT: No visual disturbance, ear ache, epistaxis, sore throat  Allergies/Immunologic: Negative.   Respiratory: No cough, hemoptysia  Cardiovascular: As per HPI  GI: No nausea, vomiting, hematemesis, melena, or hematochezia  : No urinary frequency, dysuria, or hematuria  Integument: Negative  Psychiatric: Negative  Neuro: Negative  Endocrinology: Negative   Musculoskeletal: Negative      Physical Exam:  Vitals: /78   Pulse 66   Ht 1.727 m (5' 8\")   Wt 98.4 kg (217 lb)   LMP 07/07/2008   SpO2 99%   BMI 32.99 kg/m       General: NAD  HEENT:  Dentition intact.    Neck: No jugular venous distension.   Heart: RRR with 3/6 ZAYDA at RUSB with radiation to carotid arteries.  Lungs: CTA, lloyd.    Abdomen: Soft, nontender, nondistended.   Extremities: No clubbing, cyanosis, or edema.  The pulses are +2/4 at the radial and PT pulses bilaterally.  Neurologic: Alert and oriented to person/place/time, normal speech, gait and affect  Skin: No petechiae, purpura or rash.      Laboratory Studies:  LIPID RESULTS:  Lab Results   Component Value Date    CHOL 136 03/15/2022    CHOL 135 01/21/2021    HDL 81 03/15/2022    HDL 75 01/21/2021    LDL 43 03/15/2022    LDL 48 01/21/2021    TRIG 61 03/15/2022    TRIG 59 01/21/2021    CHOLHDLRATIO 3.3 01/31/2011       LIVER ENZYME RESULTS:  Lab Results   Component Value Date    AST 18 05/26/2022    AST 18 03/11/2019    ALT 26 05/26/2022    ALT 34 06/19/2019       CBC RESULTS:  Lab Results   Component Value Date    WBC 6.5 05/26/2022    WBC 6.0 03/11/2019    RBC " 4.33 05/26/2022    RBC 4.43 03/11/2019    HGB 13.9 05/26/2022    HGB 14.4 03/11/2019    HCT 41.0 05/26/2022    HCT 42.0 03/11/2019    MCV 95 05/26/2022    MCV 95 03/11/2019    MCH 32.1 05/26/2022    MCH 32.5 03/11/2019    MCHC 33.9 05/26/2022    MCHC 34.3 03/11/2019    RDW 12.7 05/26/2022    RDW 12.4 03/11/2019     05/26/2022     03/11/2019       BMP RESULTS:  Lab Results   Component Value Date     05/26/2022     01/21/2021    POTASSIUM 4.0 05/26/2022    POTASSIUM 4.2 01/21/2021    CHLORIDE 101 05/26/2022    CHLORIDE 106 01/21/2021    CO2 30 05/26/2022    CO2 33 (H) 01/21/2021    ANIONGAP 5 05/26/2022    ANIONGAP 1 (L) 01/21/2021    GLC 97 05/26/2022    GLC 95 01/21/2021    BUN 20 05/26/2022    BUN 19 01/21/2021    CR 0.90 05/26/2022    CR 0.82 01/21/2021    GFRESTIMATED 71 05/26/2022    GFRESTIMATED 76 01/21/2021    GFRESTBLACK 88 01/21/2021    RALPH 9.8 05/26/2022    RALPH 9.7 01/21/2021        A1C RESULTS:  Lab Results   Component Value Date    A1C 5.7 (H) 03/11/2019       INR RESULTS:  Lab Results   Component Value Date    INR 1.06 05/26/2022    INR 1.05 05/09/2022    INR 0.96 03/11/2019       Assessment and Plan:  Heather is a pleasant 67 year old female who presents for pre-operative H&P in preparation for a TAVR on 5/31/2022 at [facility].     Her past medical history notable for severe symptomatic aortic stenosis (echocardiogram in 2/2022 showed mean aortic gradient of 50 mmHg in the setting of preserved LVEF), CAD (history of PCI to LAD in 2019), hypertension, and hyperlipidemia.    Type and screen orders completed. Supplies for scrubbing provided.     Patient has no fever, chills, or urinary symptoms. Patient does not have contrast dye allergy. The risks of TAVR is discussed with patient which included risk of stroke (3-4%), needing permanent pacemaker (10%), permanent renal injury, anaphylactic reaction to contrast dye, emergent complication requiring conversion to  open heart surgery (0.5-1%), embolization of the valve and/or perforation of the ventricular wall (0.5%). She will be NPO starting midnight prior to the procedure.    Patient is optimized and is acceptable candidate for the proposed procedure.  No further diagnostic evaluation is needed. Pre-TAVR instructions provided in written format.     Medication Recommendations:  Continue with aspirin 81 mg daily  Hold lisinopril, potassium, and lasix on the day of the procedure    This note was completed in part using Dragon voice recognition software. Although reviewed after completion, some word and grammatical errors may occur.    Geneva Maurer PA-C   5/26/2022  Pager: [phone]

## 2022-05-26 NOTE — LETTER
5/26/2022    Sylvia Bird MD  [address] [facility] [address]  [facility] [address]    RE: Heather Bauer       Dear Colleague,     I had the pleasure of seeing Heather Bauer in the [facility].      [facility]  H&P    Primary Cardiologist: Dr. Dave    History of Present Illness  Heather is a pleasant 67 year old female who presents for pre-operative H&P in preparation for a TAVR on 5/31/2022 at [facility].     Her past medical history notable for severe symptomatic aortic stenosis (echocardiogram in 2/2022 showed mean aortic gradient of 50 mmHg in the setting of preserved LVEF), CAD (history of PCI to LAD in 2019), hypertension, and hyperlipidemia.    Pre-TAVR coronary angiogram showed no new lesions.  She was continued on aspirin 81 mg daily.  She was seen by CV surgery team and felt to be a good candidate for either surgery or TAVR.  After some discussion patient elected to proceed with TAVR.    History of blood transfusions/reactions: No  History of abnormal bleeding/anti-platelet use: No  Steroid use in the last year: No  Personal or family history with difficulty with anesthesia: No     Encounter Diagnoses   Name Primary?     Nonrheumatic aortic valve stenosis Yes     BORJA (dyspnea on exertion)      Coronary artery disease involving native coronary artery of native heart without angina pectoris      Hypercholesteremia      Essential hypertension, benign        Current Medications:  Current Outpatient Medications   Medication Sig Dispense Refill     aspirin 81 MG EC tablet Take 81 mg by mouth daily       atorvastatin (LIPITOR) 40 MG tablet Take 1 tablet (40 mg) by mouth daily 90 tablet 1     furosemide (LASIX) 40 MG tablet Take 1 tablet (40 mg) by mouth daily 90 tablet 2     lisinopril (ZESTRIL) 10 MG tablet Take 1 tablet (10 mg) by mouth daily 90 tablet 2     metoprolol succinate ER (TOPROL XL) 25 MG 24 hr tablet Take 0.5 tablets (12.5 mg) by mouth daily 45 tablet  "1     MULTIPLE MINERALS-VITAMINS OR TABS        nitroGLYcerin (NITROSTAT) 0.4 MG sublingual tablet One tablet under the tongue every 5 minutes if needed for chest pain. May repeat every 5 minutes for a maximum of 3 doses in 15 minutes\" 25 tablet 3     potassium chloride ER (KLOR-CON M) 20 MEQ CR tablet Take 1 tablet (20 mEq) by mouth daily 90 tablet 2       Allergies:     Allergies   Allergen Reactions     No Known Drug Allergies        Past Medical History:  Past Medical History:   Diagnosis Date     Aortic valve stenosis      Essential hypertension, benign      Fracture, tibia, shaft      History of colposcopy with cervical biopsy 2/22/11    AMRITA I     Papanicolaou smear of vagina with atypical squamous cells cannot exclude high grade squamous intraepithelial lesion (ASC-H) 1/31/11       Past Surgical History:  Past Surgical History:   Procedure Laterality Date     CV CORONARY ANGIOGRAM N/A 3/11/2019    Procedure: Coronary Angiogram;  Surgeon: Adolph Dave MD;  Location: [facility] CARDIAC CATH LAB     CV CORONARY ANGIOGRAM N/A 5/9/2022    Procedure: Coronary Angiogram;  Surgeon: Madonna Garay MD;  Location: [facility] CARDIAC CATH LAB     CV PCI N/A 5/9/2022    Procedure: Percutaneous Coronary Intervention;  Surgeon: Madonna Garay MD;  Location: [facility] CARDIAC CATH LAB     CV PCI ANGIOPLASTY N/A 3/11/2019    Procedure: Percutaneous Coronary Intervention;  Surgeon: Adolph Dave MD;  Location: [facility] CARDIAC CATH LAB     ZC APPENDECTOMY         Family History:  Family History   Problem Relation Age of Onset     C.A.D. Mother         early 60's     Lipids Mother      Heart Disease Mother 68        heart bypass surgery     Hypertension Mother      Thyroid Disease Mother      Heart Disease Father         valvular disease     Thyroid Disease Paternal Aunt      Heart Disease Maternal Grandmother         heart attack       Social History:  Social History     Socioeconomic History     Marital " "status:      Spouse name: None     Number of children: 2     Years of education: None     Highest education level: None   Occupational History     Employer: [employer]   Tobacco Use     Smoking status: Former Smoker     Smokeless tobacco: Never Used     Tobacco comment: quit 20 years ago   Vaping Use     Vaping Use: Never used   Substance and Sexual Activity     Alcohol use: Yes     Comment: wine every week     Drug use: No     Sexual activity: Yes     Birth control/protection: Condom   Other Topics Concern     Exercise Yes     Seat Belt Yes       Review of Systems:  Constitutional: No fever, chills, or sweats. No weight gain/loss   ENT: No visual disturbance, ear ache, epistaxis, sore throat  Allergies/Immunologic: Negative.   Respiratory: No cough, hemoptysia  Cardiovascular: As per HPI  GI: No nausea, vomiting, hematemesis, melena, or hematochezia  : No urinary frequency, dysuria, or hematuria  Integument: Negative  Psychiatric: Negative  Neuro: Negative  Endocrinology: Negative   Musculoskeletal: Negative      Physical Exam:  Vitals: /78   Pulse 66   Ht 1.727 m (5' 8\")   Wt 98.4 kg (217 lb)   LMP 07/07/2008   SpO2 99%   BMI 32.99 kg/m       General: NAD  HEENT:  Dentition intact.    Neck: No jugular venous distension.   Heart: RRR with 3/6 ZAYDA at RUSB with radiation to carotid arteries.  Lungs: CTA, lloyd.    Abdomen: Soft, nontender, nondistended.   Extremities: No clubbing, cyanosis, or edema.  The pulses are +2/4 at the radial and PT pulses bilaterally.  Neurologic: Alert and oriented to person/place/time, normal speech, gait and affect  Skin: No petechiae, purpura or rash.      Laboratory Studies:  LIPID RESULTS:  Lab Results   Component Value Date    CHOL 136 03/15/2022    CHOL 135 01/21/2021    HDL 81 03/15/2022    HDL 75 01/21/2021    LDL 43 03/15/2022    LDL 48 01/21/2021    TRIG 61 03/15/2022    TRIG 59 01/21/2021    CHOLHDLRATIO 3.3 01/31/2011       LIVER ENZYME RESULTS:  Lab Results "   Component Value Date    AST 18 05/26/2022    AST 18 03/11/2019    ALT 26 05/26/2022    ALT 34 06/19/2019       CBC RESULTS:  Lab Results   Component Value Date    WBC 6.5 05/26/2022    WBC 6.0 03/11/2019    RBC 4.33 05/26/2022    RBC 4.43 03/11/2019    HGB 13.9 05/26/2022    HGB 14.4 03/11/2019    HCT 41.0 05/26/2022    HCT 42.0 03/11/2019    MCV 95 05/26/2022    MCV 95 03/11/2019    MCH 32.1 05/26/2022    MCH 32.5 03/11/2019    MCHC 33.9 05/26/2022    MCHC 34.3 03/11/2019    RDW 12.7 05/26/2022    RDW 12.4 03/11/2019     05/26/2022     03/11/2019       BMP RESULTS:  Lab Results   Component Value Date     05/26/2022     01/21/2021    POTASSIUM 4.0 05/26/2022    POTASSIUM 4.2 01/21/2021    CHLORIDE 101 05/26/2022    CHLORIDE 106 01/21/2021    CO2 30 05/26/2022    CO2 33 (H) 01/21/2021    ANIONGAP 5 05/26/2022    ANIONGAP 1 (L) 01/21/2021    GLC 97 05/26/2022    GLC 95 01/21/2021    BUN 20 05/26/2022    BUN 19 01/21/2021    CR 0.90 05/26/2022    CR 0.82 01/21/2021    GFRESTIMATED 71 05/26/2022    GFRESTIMATED 76 01/21/2021    GFRESTBLACK 88 01/21/2021    RALPH 9.8 05/26/2022    RALPH 9.7 01/21/2021        A1C RESULTS:  Lab Results   Component Value Date    A1C 5.7 (H) 03/11/2019       INR RESULTS:  Lab Results   Component Value Date    INR 1.06 05/26/2022    INR 1.05 05/09/2022    INR 0.96 03/11/2019       Assessment and Plan:  Heather is a pleasant 67 year old female who presents for pre-operative H&P in preparation for a TAVR on 5/31/2022 at [facility].     Her past medical history notable for severe symptomatic aortic stenosis (echocardiogram in 2/2022 showed mean aortic gradient of 50 mmHg in the setting of preserved LVEF), CAD (history of PCI to LAD in 2019), hypertension, and hyperlipidemia.    Type and screen orders completed. Supplies for scrubbing provided.     Patient has no fever, chills, or urinary symptoms. Patient does not have contrast dye allergy. The risks of  TAVR is discussed with patient which included risk of stroke (3-4%), needing permanent pacemaker (10%), permanent renal injury, anaphylactic reaction to contrast dye, emergent complication requiring conversion to open heart surgery (0.5-1%), embolization of the valve and/or perforation of the ventricular wall (0.5%). She will be NPO starting midnight prior to the procedure.    Patient is optimized and is acceptable candidate for the proposed procedure.  No further diagnostic evaluation is needed. Pre-TAVR instructions provided in written format.     Medication Recommendations:  Continue with aspirin 81 mg daily  Hold lisinopril, potassium, and lasix on the day of the procedure    This note was completed in part using Dragon voice recognition software. Although reviewed after completion, some word and grammatical errors may occur.    Geneva Maurer PA-C   5/26/2022  Pager: [phone]      Thank you for allowing me to participate in the care of your patient.      Sincerely,     Geneva Maurer PA-C     [facility]  cc:   No referring provider defined for this encounter.

## 2022-05-27 DIAGNOSIS — I35.0 NONRHEUMATIC AORTIC VALVE STENOSIS: Primary | ICD-10-CM

## 2022-05-27 RX ORDER — LIDOCAINE 40 MG/G
CREAM TOPICAL
Status: CANCELLED | OUTPATIENT
Start: 2022-05-27

## 2022-05-27 RX ORDER — SODIUM CHLORIDE 9 MG/ML
INJECTION, SOLUTION INTRAVENOUS CONTINUOUS
Status: CANCELLED | OUTPATIENT
Start: 2022-05-27

## 2022-05-27 RX ORDER — CEFAZOLIN SODIUM 2 G/100ML
2 INJECTION, SOLUTION INTRAVENOUS
Status: CANCELLED | OUTPATIENT
Start: 2022-05-27

## 2022-05-27 RX ORDER — ASPIRIN 81 MG/1
81 TABLET ORAL DAILY
Status: CANCELLED | OUTPATIENT
Start: 2022-05-27

## 2022-05-27 NOTE — PROGRESS NOTES
"PTA medications updated by Medication Scribe prior to surgery via phone call with patient (last doses completed by Nurse)     Medication history sources: Patient, Surescripts and H&P  In the past week, patient estimated taking medication this percent of the time: Greater than 90%  Adherence assessment: N/A Not Observed    Significant changes made to the medication list:  Patient reports no longer taking the following meds (med scribe removed from PTA med list): Naproxen      Additional medication history information:   None    Medication reconciliation completed by provider prior to medication history? No    Time spent in this activity: 30 minutes    The information provided in this note is only as accurate as the sources available at the time of update(s)    Prior to Admission medications    Medication Sig Last Dose Taking? Auth Provider   aspirin 81 MG EC tablet Take 81 mg by mouth daily  at am Yes Reported, Patient   atorvastatin (LIPITOR) 40 MG tablet Take 1 tablet (40 mg) by mouth daily  at pm Yes Adolph Dave MD   furosemide (LASIX) 40 MG tablet Take 1 tablet (40 mg) by mouth daily  at am Yes Geneva Maurer PA-C   lisinopril (ZESTRIL) 10 MG tablet Take 1 tablet (10 mg) by mouth daily  at am Yes Geneva Maurer PA-C   metoprolol succinate ER (TOPROL XL) 25 MG 24 hr tablet Take 0.5 tablets (12.5 mg) by mouth daily  at pm Yes Adolph Dave MD   MULTIPLE MINERALS-VITAMINS OR TABS Take 1 tablet by mouth daily  at am Yes    nitroGLYcerin (NITROSTAT) 0.4 MG sublingual tablet One tablet under the tongue every 5 minutes if needed for chest pain. May repeat every 5 minutes for a maximum of 3 doses in 15 minutes\"  at prn Yes Adolph Dave MD   potassium chloride ER (KLOR-CON M) 20 MEQ CR tablet Take 1 tablet (20 mEq) by mouth daily  at am Yes Geneva Maurer PA-C     Medication history completed by:    Thad Hernandez CPhT  Medication Scribe  [facility]     "

## 2022-05-27 NOTE — PROGRESS NOTES
History:  I was requested to evaluate patient for options for severe aortic stenosis.   Patient is a very pleasant 67-year-old woman who is accompanied by her  to the TAVR clinic.  She has been diagnosed to severe aortic stenosis.  Patient over the last 6 to 8 months has noticed progressive shortness of breath with exertion.  This is mainly going up a flight of stairs or an incline.  Although she mentions some day-to-day activities which are now more difficult than before.  This is been a slow decline in confirmed by her .  She reports of no chest pain PND or any other cardiac related symptoms.  Remotely she did have PCI to her LAD in 2019 and has no symptoms referable to this.                   Patient Active Problem List   Diagnosis     Essential hypertension, benign     HCD (health care directive)     CARDIOVASCULAR SCREENING; LDL GOAL LESS THAN 130     Class 1 obesity due to excess calories with serious comorbidity and body mass index (BMI) of 33.0 to 33.9 in adult     BORJA (dyspnea on exertion)     Nonrheumatic aortic valve stenosis     Abnormal stress test     Coronary artery disease involving native coronary artery of native heart without angina pectoris     Hypercholesteremia               Past Medical History      I have reviewed this patient's medical history and updated it with pertinent information if needed.        Past Medical History:   Diagnosis Date     Aortic valve stenosis       Essential hypertension, benign       Fracture, tibia, shaft       History of colposcopy with cervical biopsy 2/22/11     AMRITA I     Papanicolaou smear of vagina with atypical squamous cells cannot exclude high grade squamous intraepithelial lesion (ASC-H) 1/31/11               Past Surgical History      I have reviewed this patient's surgical history and updated it with pertinent information if needed.        Past Surgical History:   Procedure Laterality Date     CV CORONARY ANGIOGRAM N/A 3/11/2019      Procedure: Coronary Angiogram;  Surgeon: Adolph Dave MD;  Location: [facility] CARDIAC CATH LAB     CV PCI ANGIOPLASTY N/A 3/11/2019     Procedure: Percutaneous Coronary Intervention;  Surgeon: Adolph Dave MD;  Location: [facility] CARDIAC CATH LAB     ZZC APPENDECTOMY                   Prior to Admission Medications      reviewed     Allergies           Allergies   Allergen Reactions     No Known Drug Allergies                 Social History       reports that she has quit smoking. She has never used smokeless tobacco. She reports current alcohol use. She reports that she does not use drugs.           Family History            Family History   Problem Relation Age of Onset     C.A.D. Mother           early 60's     Lipids Mother       Heart Disease Mother 68         heart bypass surgery     Hypertension Mother       Thyroid Disease Mother       Heart Disease Father           valvular disease     Thyroid Disease Paternal Aunt       Heart Disease Maternal Grandmother           heart attack               Review of Systems      The comprehensive 10 point Review of Systems is negative other than noted in the HPI or here.            Physical Exam      Vital normal      Wt Readings from Last 4 Encounters:   12/20/21 99.7 kg (219 lb 14.4 oz)   12/17/21 99.8 kg (220 lb)   09/20/21 99.3 kg (219 lb)   03/08/21 102.5 kg (226 lb)              Vitals: LMP 07/07/2008      GENERAL: Healthy, alert and no distress  EYES: Eyes grossly normal to inspection.  No discharge or erythema, or obvious scleral/conjunctival abnormalities.  RESP: No audible wheeze, cough, or visible cyanosis.  No visible retractions or increased work of breathing.    SKIN: Visible skin clear. No significant rash, abnormal pigmentation or lesions.  NEURO: Cranial nerves grossly intact.  Mentation and speech appropriate for age.  PSYCH: Mentation appears normal, affect normal/bright, judgement and insight intact, normal speech and appearance  well-groomed.        Echo February 2022  Left ventricular systolic function is normal.  The visual ejection fraction is 65-70%.  No regional wall motion abnormalities noted.  Severe valvular aortic stenosis.  The mean AoV pressure gradient is 50mmHg.  Compared to 9/21, aortic stenosis has progressed, mean gradient has increased  from 35 to 50mm. The study was technically difficult.     Assessment & Plan      67 year old female with severe symptomatic aortic stenosis. I agree that being low risk, she could be a candidate for either TAVR or SAVR. I discussed the risks and benefits of surgery with patient and family including the risks of death, bleeding, stroke, infection, renal failure and arrhythmias. She understands and wishes to think about it. I have discussed plan with cardiology team.

## 2022-05-30 ENCOUNTER — ANESTHESIA EVENT (OUTPATIENT)
Dept: CARDIOLOGY | Facility: CLINIC | Age: 67
DRG: 267 | End: 2022-05-30
Payer: COMMERCIAL

## 2022-05-31 ENCOUNTER — ANESTHESIA (OUTPATIENT)
Dept: CARDIOLOGY | Facility: CLINIC | Age: 67
DRG: 267 | End: 2022-05-31
Payer: COMMERCIAL

## 2022-05-31 ENCOUNTER — HOSPITAL ENCOUNTER (OUTPATIENT)
Dept: CARDIOLOGY | Facility: CLINIC | Age: 67
Discharge: HOME | DRG: 267 | End: 2022-05-31
Attending: INTERNAL MEDICINE | Admitting: INTERNAL MEDICINE
Payer: COMMERCIAL

## 2022-05-31 ENCOUNTER — HOSPITAL ENCOUNTER (INPATIENT)
Facility: CLINIC | Age: 67
LOS: 1 days | Discharge: HOME | DRG: 267 | End: 2022-06-01
Attending: INTERNAL MEDICINE | Admitting: THORACIC SURGERY (CARDIOTHORACIC VASCULAR SURGERY)
Payer: COMMERCIAL

## 2022-05-31 DIAGNOSIS — Z95.2 S/P TAVR (TRANSCATHETER AORTIC VALVE REPLACEMENT): Primary | ICD-10-CM

## 2022-05-31 DIAGNOSIS — I35.0 NONRHEUMATIC AORTIC VALVE STENOSIS: ICD-10-CM

## 2022-05-31 LAB
ACT BLD: 126 SECONDS (ref 74–150)
ACT BLD: 148 SECONDS (ref 74–150)
ACT BLD: 313 SECONDS (ref 74–150)
ACT BLD: 356 SECONDS (ref 74–150)
BLD PROD TYP BPU: NORMAL
BLD PROD TYP BPU: NORMAL
BLOOD COMPONENT TYPE: NORMAL
BLOOD COMPONENT TYPE: NORMAL
CODING SYSTEM: NORMAL
CODING SYSTEM: NORMAL
CROSSMATCH: NORMAL
CROSSMATCH: NORMAL
GLUCOSE BLDC GLUCOMTR-MCNC: 71 MG/DL (ref 70–99)
HGB BLD-MCNC: 11.7 G/DL (ref 11.7–15.7)
ISSUE DATE AND TIME: NORMAL
ISSUE DATE AND TIME: NORMAL
LVEF ECHO: NORMAL
POTASSIUM BLD-SCNC: 4.2 MMOL/L (ref 3.4–5.3)
UNIT ABO/RH: NORMAL
UNIT ABO/RH: NORMAL
UNIT NUMBER: NORMAL
UNIT NUMBER: NORMAL
UNIT STATUS: NORMAL
UNIT STATUS: NORMAL
UNIT TYPE ISBT: 6200
UNIT TYPE ISBT: 6200

## 2022-05-31 PROCEDURE — 33361 REPLACE AORTIC VALVE PERQ: CPT | Mod: 62 | Performed by: INTERNAL MEDICINE

## 2022-05-31 PROCEDURE — 250N000011 HC RX IP 250 OP 636: Performed by: ANESTHESIOLOGY

## 2022-05-31 PROCEDURE — 258N000003 HC RX IP 258 OP 636: Performed by: INTERNAL MEDICINE

## 2022-05-31 PROCEDURE — X2A5312 CEREBRAL EMBOLIC FILTRATION, DUAL FILTER IN INNOMINATE ARTERY AND LEFT COMMON CAROTID ARTERY, PERCUTANEOUS APPROACH, NEW TECHNOLOGY GROUP 2: ICD-10-PCS | Performed by: INTERNAL MEDICINE

## 2022-05-31 PROCEDURE — 36415 COLL VENOUS BLD VENIPUNCTURE: CPT | Performed by: NURSE PRACTITIONER

## 2022-05-31 PROCEDURE — C1730 CATH, EP, 19 OR FEW ELECT: HCPCS | Performed by: INTERNAL MEDICINE

## 2022-05-31 PROCEDURE — C1769 GUIDE WIRE: HCPCS | Performed by: INTERNAL MEDICINE

## 2022-05-31 PROCEDURE — C1725 CATH, TRANSLUMIN NON-LASER: HCPCS | Performed by: INTERNAL MEDICINE

## 2022-05-31 PROCEDURE — 33361 REPLACE AORTIC VALVE PERQ: CPT | Performed by: INTERNAL MEDICINE

## 2022-05-31 PROCEDURE — 258N000003 HC RX IP 258 OP 636: Performed by: ANESTHESIOLOGY

## 2022-05-31 PROCEDURE — 86923 COMPATIBILITY TEST ELECTRIC: CPT | Performed by: INTERNAL MEDICINE

## 2022-05-31 PROCEDURE — 37197 REMOVE INTRVAS FOREIGN BODY: CPT | Performed by: INTERNAL MEDICINE

## 2022-05-31 PROCEDURE — 84132 ASSAY OF SERUM POTASSIUM: CPT | Performed by: ANESTHESIOLOGY

## 2022-05-31 PROCEDURE — 250N000009 HC RX 250: Performed by: INTERNAL MEDICINE

## 2022-05-31 PROCEDURE — 85347 COAGULATION TIME ACTIVATED: CPT

## 2022-05-31 PROCEDURE — 278N000051 HC OR IMPLANT GENERAL: Performed by: INTERNAL MEDICINE

## 2022-05-31 PROCEDURE — 36415 COLL VENOUS BLD VENIPUNCTURE: CPT | Performed by: ANESTHESIOLOGY

## 2022-05-31 PROCEDURE — P9041 ALBUMIN (HUMAN),5%, 50ML: HCPCS | Performed by: ANESTHESIOLOGY

## 2022-05-31 PROCEDURE — P9016 RBC LEUKOCYTES REDUCED: HCPCS | Performed by: INTERNAL MEDICINE

## 2022-05-31 PROCEDURE — 250N000013 HC RX MED GY IP 250 OP 250 PS 637: Performed by: INTERNAL MEDICINE

## 2022-05-31 PROCEDURE — 33370 TCAT PLMT&RMVL CEPD PERQ: CPT | Performed by: INTERNAL MEDICINE

## 2022-05-31 PROCEDURE — 272N000001 HC OR GENERAL SUPPLY STERILE: Performed by: INTERNAL MEDICINE

## 2022-05-31 PROCEDURE — 33361 REPLACE AORTIC VALVE PERQ: CPT | Mod: 62 | Performed by: THORACIC SURGERY (CARDIOTHORACIC VASCULAR SURGERY)

## 2022-05-31 PROCEDURE — 250N000011 HC RX IP 250 OP 636: Performed by: INTERNAL MEDICINE

## 2022-05-31 PROCEDURE — 370N000017 HC ANESTHESIA TECHNICAL FEE, PER MIN: Performed by: INTERNAL MEDICINE

## 2022-05-31 PROCEDURE — 4A023N7 MEASUREMENT OF CARDIAC SAMPLING AND PRESSURE, LEFT HEART, PERCUTANEOUS APPROACH: ICD-10-PCS | Performed by: INTERNAL MEDICINE

## 2022-05-31 PROCEDURE — 93005 ELECTROCARDIOGRAM TRACING: CPT

## 2022-05-31 PROCEDURE — 93306 TTE W/DOPPLER COMPLETE: CPT

## 2022-05-31 PROCEDURE — C1894 INTRO/SHEATH, NON-LASER: HCPCS | Performed by: INTERNAL MEDICINE

## 2022-05-31 PROCEDURE — 85018 HEMOGLOBIN: CPT | Performed by: NURSE PRACTITIONER

## 2022-05-31 PROCEDURE — 250N000009 HC RX 250: Performed by: REGISTERED NURSE

## 2022-05-31 PROCEDURE — 258N000003 HC RX IP 258 OP 636: Performed by: REGISTERED NURSE

## 2022-05-31 PROCEDURE — 250N000013 HC RX MED GY IP 250 OP 250 PS 637: Performed by: PHYSICIAN ASSISTANT

## 2022-05-31 PROCEDURE — 210N000002 HC R&B HEART CARE

## 2022-05-31 PROCEDURE — 93010 ELECTROCARDIOGRAM REPORT: CPT | Mod: 76 | Performed by: INTERNAL MEDICINE

## 2022-05-31 PROCEDURE — C1760 CLOSURE DEV, VASC: HCPCS | Performed by: INTERNAL MEDICINE

## 2022-05-31 PROCEDURE — 02RF38Z REPLACEMENT OF AORTIC VALVE WITH ZOOPLASTIC TISSUE, PERCUTANEOUS APPROACH: ICD-10-PCS | Performed by: INTERNAL MEDICINE

## 2022-05-31 PROCEDURE — 99222 1ST HOSP IP/OBS MODERATE 55: CPT | Mod: AI | Performed by: PHYSICIAN ASSISTANT

## 2022-05-31 PROCEDURE — 250N000011 HC RX IP 250 OP 636: Performed by: REGISTERED NURSE

## 2022-05-31 PROCEDURE — 93306 TTE W/DOPPLER COMPLETE: CPT | Mod: 26 | Performed by: INTERNAL MEDICINE

## 2022-05-31 DEVICE — DEVICE CLOSURE VASCULAR MANTA 18FR 2115: Type: IMPLANTABLE DEVICE | Status: FUNCTIONAL

## 2022-05-31 DEVICE — VALVE AORTIC SAPEIN 3 UTLRA TAVR 23MM 9750TFX23A: Type: IMPLANTABLE DEVICE | Status: FUNCTIONAL

## 2022-05-31 RX ORDER — ONDANSETRON 2 MG/ML
INJECTION INTRAMUSCULAR; INTRAVENOUS PRN
Status: DISCONTINUED | OUTPATIENT
Start: 2022-05-31 | End: 2022-05-31

## 2022-05-31 RX ORDER — LIDOCAINE 40 MG/G
CREAM TOPICAL
Status: DISCONTINUED | OUTPATIENT
Start: 2022-05-31 | End: 2022-05-31 | Stop reason: HOSPADM

## 2022-05-31 RX ORDER — ATORVASTATIN CALCIUM 40 MG/1
40 TABLET, FILM COATED ORAL EVERY EVENING
Status: DISCONTINUED | OUTPATIENT
Start: 2022-05-31 | End: 2022-06-01 | Stop reason: HOSPADM

## 2022-05-31 RX ORDER — SODIUM CHLORIDE 9 MG/ML
INJECTION, SOLUTION INTRAVENOUS CONTINUOUS
Status: DISCONTINUED | OUTPATIENT
Start: 2022-05-31 | End: 2022-05-31 | Stop reason: HOSPADM

## 2022-05-31 RX ORDER — PROTAMINE SULFATE 10 MG/ML
INJECTION, SOLUTION INTRAVENOUS PRN
Status: DISCONTINUED | OUTPATIENT
Start: 2022-05-31 | End: 2022-05-31

## 2022-05-31 RX ORDER — SODIUM CHLORIDE, SODIUM LACTATE, POTASSIUM CHLORIDE, CALCIUM CHLORIDE 600; 310; 30; 20 MG/100ML; MG/100ML; MG/100ML; MG/100ML
INJECTION, SOLUTION INTRAVENOUS CONTINUOUS PRN
Status: DISCONTINUED | OUTPATIENT
Start: 2022-05-31 | End: 2022-05-31

## 2022-05-31 RX ORDER — ONDANSETRON 2 MG/ML
4 INJECTION INTRAMUSCULAR; INTRAVENOUS EVERY 30 MIN PRN
Status: DISCONTINUED | OUTPATIENT
Start: 2022-05-31 | End: 2022-05-31 | Stop reason: HOSPADM

## 2022-05-31 RX ORDER — OXYCODONE HYDROCHLORIDE 5 MG/1
10 TABLET ORAL EVERY 4 HOURS PRN
Status: DISCONTINUED | OUTPATIENT
Start: 2022-05-31 | End: 2022-06-01 | Stop reason: HOSPADM

## 2022-05-31 RX ORDER — HYDROMORPHONE HYDROCHLORIDE 1 MG/ML
0.4 INJECTION, SOLUTION INTRAMUSCULAR; INTRAVENOUS; SUBCUTANEOUS EVERY 5 MIN PRN
Status: DISCONTINUED | OUTPATIENT
Start: 2022-05-31 | End: 2022-05-31 | Stop reason: HOSPADM

## 2022-05-31 RX ORDER — ALBUMIN, HUMAN INJ 5% 5 %
12.5 SOLUTION INTRAVENOUS ONCE
Status: COMPLETED | OUTPATIENT
Start: 2022-05-31 | End: 2022-05-31

## 2022-05-31 RX ORDER — CEFAZOLIN SODIUM/WATER 2 G/20 ML
2 SYRINGE (ML) INTRAVENOUS
Status: COMPLETED | OUTPATIENT
Start: 2022-05-31 | End: 2022-05-31

## 2022-05-31 RX ORDER — NALOXONE HYDROCHLORIDE 0.4 MG/ML
0.2 INJECTION, SOLUTION INTRAMUSCULAR; INTRAVENOUS; SUBCUTANEOUS
Status: DISCONTINUED | OUTPATIENT
Start: 2022-05-31 | End: 2022-06-01 | Stop reason: HOSPADM

## 2022-05-31 RX ORDER — POLYETHYLENE GLYCOL 3350 17 G/17G
17 POWDER, FOR SOLUTION ORAL DAILY PRN
Status: DISCONTINUED | OUTPATIENT
Start: 2022-05-31 | End: 2022-06-01 | Stop reason: HOSPADM

## 2022-05-31 RX ORDER — ASPIRIN 81 MG/1
81 TABLET ORAL DAILY
Status: DISCONTINUED | OUTPATIENT
Start: 2022-06-01 | End: 2022-06-01 | Stop reason: HOSPADM

## 2022-05-31 RX ORDER — NALOXONE HYDROCHLORIDE 0.4 MG/ML
0.4 INJECTION, SOLUTION INTRAMUSCULAR; INTRAVENOUS; SUBCUTANEOUS
Status: DISCONTINUED | OUTPATIENT
Start: 2022-05-31 | End: 2022-06-01 | Stop reason: HOSPADM

## 2022-05-31 RX ORDER — ASPIRIN 81 MG/1
81 TABLET ORAL DAILY
Status: DISCONTINUED | OUTPATIENT
Start: 2022-05-31 | End: 2022-05-31 | Stop reason: HOSPADM

## 2022-05-31 RX ORDER — AMOXICILLIN 250 MG
1-2 CAPSULE ORAL 2 TIMES DAILY PRN
Status: DISCONTINUED | OUTPATIENT
Start: 2022-05-31 | End: 2022-06-01 | Stop reason: HOSPADM

## 2022-05-31 RX ORDER — MEPERIDINE HYDROCHLORIDE 25 MG/ML
12.5 INJECTION INTRAMUSCULAR; INTRAVENOUS; SUBCUTANEOUS EVERY 5 MIN PRN
Status: DISCONTINUED | OUTPATIENT
Start: 2022-05-31 | End: 2022-05-31 | Stop reason: HOSPADM

## 2022-05-31 RX ORDER — IOPAMIDOL 755 MG/ML
INJECTION, SOLUTION INTRAVASCULAR
Status: DISCONTINUED | OUTPATIENT
Start: 2022-05-31 | End: 2022-05-31 | Stop reason: HOSPADM

## 2022-05-31 RX ORDER — ONDANSETRON 4 MG/1
4 TABLET, ORALLY DISINTEGRATING ORAL EVERY 30 MIN PRN
Status: DISCONTINUED | OUTPATIENT
Start: 2022-05-31 | End: 2022-05-31 | Stop reason: HOSPADM

## 2022-05-31 RX ORDER — ACETAMINOPHEN 325 MG/1
650 TABLET ORAL EVERY 4 HOURS PRN
Status: DISCONTINUED | OUTPATIENT
Start: 2022-05-31 | End: 2022-06-01 | Stop reason: HOSPADM

## 2022-05-31 RX ORDER — NITROGLYCERIN 0.4 MG/1
0.4 TABLET SUBLINGUAL EVERY 5 MIN PRN
Status: DISCONTINUED | OUTPATIENT
Start: 2022-05-31 | End: 2022-06-01 | Stop reason: HOSPADM

## 2022-05-31 RX ORDER — FENTANYL CITRATE 50 UG/ML
50 INJECTION, SOLUTION INTRAMUSCULAR; INTRAVENOUS EVERY 5 MIN PRN
Status: DISCONTINUED | OUTPATIENT
Start: 2022-05-31 | End: 2022-05-31 | Stop reason: HOSPADM

## 2022-05-31 RX ORDER — KETOROLAC TROMETHAMINE 30 MG/ML
15 INJECTION, SOLUTION INTRAMUSCULAR; INTRAVENOUS
Status: DISCONTINUED | OUTPATIENT
Start: 2022-05-31 | End: 2022-05-31 | Stop reason: HOSPADM

## 2022-05-31 RX ORDER — SODIUM CHLORIDE, SODIUM LACTATE, POTASSIUM CHLORIDE, CALCIUM CHLORIDE 600; 310; 30; 20 MG/100ML; MG/100ML; MG/100ML; MG/100ML
INJECTION, SOLUTION INTRAVENOUS CONTINUOUS
Status: DISCONTINUED | OUTPATIENT
Start: 2022-05-31 | End: 2022-05-31 | Stop reason: HOSPADM

## 2022-05-31 RX ORDER — SODIUM CHLORIDE 9 MG/ML
INJECTION, SOLUTION INTRAVENOUS CONTINUOUS
Status: DISCONTINUED | OUTPATIENT
Start: 2022-05-31 | End: 2022-05-31

## 2022-05-31 RX ORDER — HYDRALAZINE HYDROCHLORIDE 20 MG/ML
10 INJECTION INTRAMUSCULAR; INTRAVENOUS
Status: DISCONTINUED | OUTPATIENT
Start: 2022-05-31 | End: 2022-06-01 | Stop reason: HOSPADM

## 2022-05-31 RX ORDER — HEPARIN SODIUM 1000 [USP'U]/ML
INJECTION, SOLUTION INTRAVENOUS; SUBCUTANEOUS PRN
Status: DISCONTINUED | OUTPATIENT
Start: 2022-05-31 | End: 2022-05-31

## 2022-05-31 RX ORDER — PROPOFOL 10 MG/ML
INJECTION, EMULSION INTRAVENOUS PRN
Status: DISCONTINUED | OUTPATIENT
Start: 2022-05-31 | End: 2022-05-31

## 2022-05-31 RX ORDER — LABETALOL 20 MG/4 ML (5 MG/ML) INTRAVENOUS SYRINGE
10
Status: DISCONTINUED | OUTPATIENT
Start: 2022-05-31 | End: 2022-05-31 | Stop reason: HOSPADM

## 2022-05-31 RX ORDER — DEXMEDETOMIDINE HYDROCHLORIDE 4 UG/ML
INJECTION, SOLUTION INTRAVENOUS PRN
Status: DISCONTINUED | OUTPATIENT
Start: 2022-05-31 | End: 2022-05-31

## 2022-05-31 RX ADMIN — PROPOFOL 10 MG: 10 INJECTION, EMULSION INTRAVENOUS at 08:30

## 2022-05-31 RX ADMIN — Medication 2 G: at 07:49

## 2022-05-31 RX ADMIN — ONDANSETRON 4 MG: 2 INJECTION INTRAMUSCULAR; INTRAVENOUS at 09:09

## 2022-05-31 RX ADMIN — PHENYLEPHRINE HYDROCHLORIDE 100 MCG: 10 INJECTION INTRAVENOUS at 08:56

## 2022-05-31 RX ADMIN — Medication 8 MCG: at 08:01

## 2022-05-31 RX ADMIN — DEXMEDETOMIDINE HYDROCHLORIDE 1 MCG/KG/HR: 100 INJECTION, SOLUTION INTRAVENOUS at 07:46

## 2022-05-31 RX ADMIN — ALBUMIN HUMAN 12.5 G: 0.05 INJECTION, SOLUTION INTRAVENOUS at 10:20

## 2022-05-31 RX ADMIN — PROPOFOL 10 MG: 10 INJECTION, EMULSION INTRAVENOUS at 08:12

## 2022-05-31 RX ADMIN — SENNOSIDES AND DOCUSATE SODIUM 1 TABLET: 50; 8.6 TABLET ORAL at 20:44

## 2022-05-31 RX ADMIN — Medication 12 MCG: at 07:46

## 2022-05-31 RX ADMIN — SODIUM CHLORIDE, SODIUM LACTATE, POTASSIUM CHLORIDE, CALCIUM CHLORIDE: 600; 310; 30; 20 INJECTION, SOLUTION INTRAVENOUS at 08:24

## 2022-05-31 RX ADMIN — PHENYLEPHRINE HYDROCHLORIDE 100 MCG: 10 INJECTION INTRAVENOUS at 08:58

## 2022-05-31 RX ADMIN — SODIUM CHLORIDE, POTASSIUM CHLORIDE, SODIUM LACTATE AND CALCIUM CHLORIDE: 600; 310; 30; 20 INJECTION, SOLUTION INTRAVENOUS at 10:48

## 2022-05-31 RX ADMIN — SODIUM CHLORIDE: 9 INJECTION, SOLUTION INTRAVENOUS at 07:00

## 2022-05-31 RX ADMIN — ATORVASTATIN CALCIUM 40 MG: 40 TABLET, FILM COATED ORAL at 20:06

## 2022-05-31 RX ADMIN — PROPOFOL 5 MG: 10 INJECTION, EMULSION INTRAVENOUS at 08:51

## 2022-05-31 RX ADMIN — HEPARIN SODIUM 15000 UNITS: 1000 INJECTION INTRAVENOUS; SUBCUTANEOUS at 08:31

## 2022-05-31 RX ADMIN — SODIUM CHLORIDE 250 ML: 9 INJECTION, SOLUTION INTRAVENOUS at 12:11

## 2022-05-31 RX ADMIN — PROTAMINE SULFATE 70 MG: 10 INJECTION, SOLUTION INTRAVENOUS at 09:21

## 2022-05-31 ASSESSMENT — ACTIVITIES OF DAILY LIVING (ADL)
ADLS_ACUITY_SCORE: 20
ADLS_ACUITY_SCORE: 20
ADLS_ACUITY_SCORE: 18
ADLS_ACUITY_SCORE: 18
ADLS_ACUITY_SCORE: 20
ADLS_ACUITY_SCORE: 18

## 2022-05-31 ASSESSMENT — LIFESTYLE VARIABLES: TOBACCO_USE: 1

## 2022-05-31 NOTE — OP NOTE
Procedure Date: 05/31/2022    PREOPERATIVE DIAGNOSES:    1.  Symptomatic severe aortic valve stenosis.  2.  Bicuspid aortic valve.  3.  Hypertension.  4.  Hyperlipidemia.  5.  Coronary artery disease with prior percutaneous coronary intervention.    POSTOPERATIVE DIAGNOSES:     1.  Symptomatic severe aortic valve stenosis.  2.  Bicuspid aortic valve.  3.  Hypertension.  4.  Hyperlipidemia.  5.  Coronary artery disease with prior percutaneous coronary intervention.    PROCEDURE:  Right transfemoral implantation of a 23 mm Crisostomo SAMEERA 3 Ultra bioprosthesis.    INTERVENTIONAL CARDIOLOGISTS:  Madonna Garay MD; Edwin Moe MD    CARDIAC SURGEON:  Ishaan Gallo MD    ANESTHESIA:  Monitored anesthesia care with local.    INDICATION FOR PROCEDURE:  The patient is a 67-year-old female with worsening exertional dyspnea.  On further evaluation, she was found to have severe aortic valve stenosis.  Following discussion of risks and benefits, she has proceeded with transcatheter valve implantation.    PROCEDURE IN DETAIL:  The patient was brought to the cath lab and placed supine on the table.  Monitored anesthesia care was delivered.  The patient was sterilely prepped and draped.  Timeout was performed to confirm patient identity, procedure to be performed, and the administration of preoperative antibiotics.    Combination of ultrasound and fluoroscopy was utilized to obtain bilateral common femoral arterial access and left common femoral venous access.  This was exchanged for sheath access.  A temporary pacemaker was advanced in the right ventricle.  A pigtail catheter was advanced from the left-sided arterial access into the aortic root.    A large bore sheath was then placed on the right over a stiff wire and the patient was systemically heparinized.    Right radial access was obtained and exchanged for a sheath.  The sentinel device was advanced from this position with the proximal filter in the innominate  artery and the distal filter in the left common carotid.    The native valve was then crossed with an AL1 catheter and a straight wire.  This was exchanged for a J wire followed by a pigtail followed by a Safari.  Balloon valvuloplasty was then performed given the severe stenosis of her bicuspid valve and the eccentricity of the stenosis.  Balloon was advanced over the existing Safari wire into the aortic root and valvuloplasty performed under rapid pacing.  Upon cessation of pacing, the patient's native rhythm and hemodynamics recovered promptly.    The balloon was removed and the valve delivery system was then readvanced over the wire into the aortic root.  Under rapid pacing, the valve was deployed with an excellent fluoroscopic result.  Upon cessation of pacing, the patient's native rhythm and hemodynamics recovered promptly.    Echocardiography and root aortography revealed excellent valve position without any perivalvular insufficiency.  The valve delivery system was recaptured and fully withdrawn.  The sentinel device was recaptured and removed.  Protamine was administered to reverse the patient's heparinization.  The left-sided pigtail was removed to the level of the iliac bifurcation.  The temporary pacemaker was removed.  The large bore sheath was removed over an access wire and a MANTA device used for closure of the large bore site.  Completion iliofemoral angiography revealed no extravasation or stenosis.  The left-sided arterial access was managed with an Angio-Seal.  The left-sided venous access was managed with manual pressure.  The right radial access was managed with a TR band.    At the end of procedure, all counts were correct.  The patient was taken to recovery in stable condition.    Ishaan Gallo MD        D: 2022   T: 2022   MT: CHIARA    Name:     ABBY ASHTON  MRN:      [id number]        Account:        [id number]   :      1955           Procedure Date:  05/31/2022     Document: [id number]

## 2022-05-31 NOTE — ANESTHESIA POSTPROCEDURE EVALUATION
Patient: Heather Bauer    Procedure: Procedure(s):  Transcatheter Aortic Valve Replacement-Femoral Approach       Anesthesia Type:  MAC    Note:  Disposition: Inpatient   Postop Pain Control: Uneventful            Sign Out: Well controlled pain   PONV:    Neuro/Psych: Uneventful            Sign Out: Acceptable/Baseline neuro status   Airway/Respiratory: Uneventful            Sign Out: Acceptable/Baseline resp. status   CV/Hemodynamics: Uneventful            Sign Out: Acceptable CV status   Other NRE: NONE   DID A NON-ROUTINE EVENT OCCUR? No           Last vitals:  Vitals Value Taken Time   BP 97/67 05/31/22 1040   Temp 36.3  C (97.3  F) 05/31/22 1030   Pulse 59 05/31/22 1047   Resp 14 05/31/22 1047   SpO2 96 % 05/31/22 1047   Vitals shown include unvalidated device data.    Electronically Signed By: Valente Mckeon MD  May 31, 2022  10:48 AM

## 2022-05-31 NOTE — Clinical Note
Temporary pacemaker Rate= 110bpmPaced mA= 5Pacer wire was captured appropriately, Pacer is set, Demand on Standby and Pacing catheter was removed per order

## 2022-05-31 NOTE — PLAN OF CARE
Goal Outcome Evaluation:    Plan of Care Reviewed With: patient, spouse     Overall Patient Progress: improving     Neuro:intact  CV/Rhythm:SB, 1.6 sec pause asymtomatic. Dr Garay and Patricia JACINTO aware, EKG done SB  Resp/02:RA  GI/Diet:NPO until 330  :pure wick in place voided  Skin/Incisions/Sites:intact  Pulses/CMS:1+ PT, 1+ DP  Edema:none  Activity/Falls Risk:fall risk, bedrest until 330  Lines/Drains/IVs:PIV, old R and L groin sites, R TR site  Labs/BGM:K 4.2, Mag 1.9  Test/Procedures:TAVR today  VS/Pain:hypotensive, denies pain, SB  DC Plan:tomorrow? Cardiac rehab, labs, echo on 6/1  Other:voiding, off bedrest at 1530, NPO until 330

## 2022-05-31 NOTE — ANESTHESIA CARE TRANSFER NOTE
Patient: Heather Bauer    Procedure: Procedure(s):  Transcatheter Aortic Valve Replacement-Femoral Approach       Diagnosis: valvular disease  Diagnosis Additional Information: No value filed.    Anesthesia Type:   MAC     Note:    Oropharynx: oropharynx clear of all foreign objects  Level of Consciousness: awake  Oxygen Supplementation: face mask  Level of Supplemental Oxygen (L/min / FiO2): 6  Independent Airway: airway patency satisfactory and stable  Dentition: dentition unchanged  Vital Signs Stable: post-procedure vital signs reviewed and stable  Report to RN Given: handoff report given  Patient transferred to: PACU  Comments: Patient comfortable  Handoff Report: Identifed the Patient, Identified the Reponsible Provider, Reviewed the pertinent medical history, Discussed the surgical course, Reviewed Intra-OP anesthesia mangement and issues during anesthesia, Set expectations for post-procedure period and Allowed opportunity for questions and acknowledgement of understanding      Vitals:  Vitals Value Taken Time   BP     Temp     Pulse 64 05/31/22 0954   Resp 16 05/31/22 0954   SpO2 96 % 05/31/22 0954   Vitals shown include unvalidated device data.    Electronically Signed By: KOBI Werner CRNA  May 31, 2022  9:55 AM

## 2022-05-31 NOTE — H&P
[facility]    History and Physical  Hospitalist       Date of Admission:  5/31/2022    Assessment & Plan   Heather Bauer is a 67 year old female with a history of CAD s/p PCI to LAD 2019, HTN, HLD, severe AS who presents 5/31/2022 for planned TAVR.     Severe aortic stenosis s/p TAVR 5/31/2022  Presents for TAVR in the setting of severe AS with progressive dyspnea on exertion.   Procedure was performed by Dr Garay utilizing MAC.   Post procedure she has been hypotensive with mild bradycardia as well. Feeling fatigued, otherwise asymptomatic. Access sites well appearing.   --post procedure management per Cardiology  --s/p albumin x1 in PACU, on second 250cc bolus presently per Cardiology  --defer resumption of cardiac medications to Cardiology service   --tele  --bedrest per protocol     Coronary artery disease s/p PCI to LAD 2019  Pre-TAVR coronary angiogram stable without new lesions.   ECHO shows EF 65-70%   PTA: asa 81mg, lipitor 40mg, metoprolol 12.5mg daily  --asa has been continued  --resume lipitor  --defer resumption of BB to Cardiology     HTN  PTA: furosemide 40mg daily, lisinopril 10mg daily, metoprolol 12.5mg daily  --defer resumption of medications to Cardiology. Will hold for now given hypotension     HLD  Continue statin     DVT Prophylaxis: Pneumatic Compression Devices  Code Status: Full Code    Disposition: Expected discharge 6/1/22 pending clinical course    Patient was discussed with Dr. Munguia who agrees with the above plan     Jessica Hatfield PA-C, PA-C    Primary Care Physician   Sylvia Bird    History is obtained from the patient    History of Present Illness   Heather Bauer is a 67 year old female with a history of CAD s/p PCI to LAD 2019, HTN, HLD, severe AS who presents 5/31/2022 for planned TAVR.   Patient reports progressive symptoms of BORJA prompting above procedure. Procedure was performed by Dr. Olivares under MAC. Post procedure the patient has  been hypotensive 70-80s systolic. She reports she feels fatigued but otherwise denies pain, dyspnea, light headedness. She received albumin in PACU and has second 250cc bolus currently infusing per Cardiology order.   She did not take any medications prior to her procedure today. Last dose of BB was evening prior to procedure.   Denies sensation changes, visual changes, speech difficulty.     Past Medical History    Past Medical History:   Diagnosis Date     Aortic valve stenosis      Essential hypertension, benign      Fracture, tibia, shaft      History of colposcopy with cervical biopsy 2/22/11    AMRITA I     Papanicolaou smear of vagina with atypical squamous cells cannot exclude high grade squamous intraepithelial lesion (ASC-H) 1/31/11       Past Surgical History   Past Surgical History:   Procedure Laterality Date     CV CORONARY ANGIOGRAM N/A 3/11/2019    Procedure: Coronary Angiogram;  Surgeon: Adolph Dave MD;  Location: [facility] CARDIAC CATH LAB     CV CORONARY ANGIOGRAM N/A 5/9/2022    Procedure: Coronary Angiogram;  Surgeon: Madonna Garay MD;  Location: [facility] CARDIAC CATH LAB     CV PCI N/A 5/9/2022    Procedure: Percutaneous Coronary Intervention;  Surgeon: Madonna Garay MD;  Location: [facility] CARDIAC CATH LAB     CV PCI ANGIOPLASTY N/A 3/11/2019    Procedure: Percutaneous Coronary Intervention;  Surgeon: Adolph Dave MD;  Location: [facility] CARDIAC CATH LAB     ZC APPENDECTOMY         Prior to Admission Medications   Prior to Admission Medications   Prescriptions Last Dose Informant Patient Reported? Taking?   MULTIPLE MINERALS-VITAMINS OR TABS 5/30/2022 at am Self No Yes   Sig: Take 1 tablet by mouth daily   aspirin 81 MG EC tablet 5/31/2022 at am Self Yes Yes   Sig: Take 81 mg by mouth daily   atorvastatin (LIPITOR) 40 MG tablet 5/30/2022 at pm Self No Yes   Sig: Take 1 tablet (40 mg) by mouth daily   furosemide (LASIX) 40 MG tablet 5/30/2022 at am Self No Yes   Sig: Take  "1 tablet (40 mg) by mouth daily   lisinopril (ZESTRIL) 10 MG tablet 5/30/2022 at am Self No Yes   Sig: Take 1 tablet (10 mg) by mouth daily   metoprolol succinate ER (TOPROL XL) 25 MG 24 hr tablet 5/30/2022 at pm Self No Yes   Sig: Take 0.5 tablets (12.5 mg) by mouth daily   nitroGLYcerin (NITROSTAT) 0.4 MG sublingual tablet  at prn Self No Yes   Sig: One tablet under the tongue every 5 minutes if needed for chest pain. May repeat every 5 minutes for a maximum of 3 doses in 15 minutes\"   potassium chloride ER (KLOR-CON M) 20 MEQ CR tablet 5/30/2022 at am Self No Yes   Sig: Take 1 tablet (20 mEq) by mouth daily      Facility-Administered Medications: None     Allergies   Allergies   Allergen Reactions     No Known Drug Allergies        Social History   Denies tobacco use. Drinks a small glass of wine most nights.     Family History   Family History   Problem Relation Age of Onset     C.A.D. Mother         early 60's     Lipids Mother      Heart Disease Mother 68        heart bypass surgery     Hypertension Mother      Thyroid Disease Mother      Heart Disease Father         valvular disease     Thyroid Disease Paternal Aunt      Heart Disease Maternal Grandmother         heart attack       Review of Systems   The 10 point Review of Systems is negative other than noted in the HPI or here.     Physical Exam   Temp: 97.2  F (36.2  C) Temp src: Temporal BP: 123/86 Pulse: 74   Resp: 16 SpO2: 100 % O2 Device: Oxymask    Vitals:    05/31/22 0603   Weight: 98.8 kg (217 lb 12.8 oz)     Vital Signs with Ranges  Temp:  [97.2  F (36.2  C)] 97.2  F (36.2  C)  Pulse:  [74] 74  Resp:  [16] 16  BP: (123)/(86) 123/86  SpO2:  [100 %] 100 %  No intake/output data recorded.    Constitutional: Alert and oriented x4, laying down in bed. Appears comfortable and is appropriately conversant.   ENT:  moist mucous membranes  Eyes:  Sclera anicteric, EOMI  Respiratory: Lungs clear to auscultation bilaterally anterioly, no increased work of " breathing  Cardiovascular: Regular rhythm with mild bradycardia   GI: active bowel sounds, abdomen soft, non-tender  Skin/Integumen: R radial site, groin site well appearing   MSK:   strength LUE 5/5. Can dorsi and plantar flex bilaterally   Neuro:  Speech is clear. Face symmetric. Follows commands. Sensation intact.     Data   Data reviewed today:  I personally reviewed no images or EKG's today.  Recent Labs   Lab 05/31/22  0634 05/26/22  1326   WBC  --  6.5   HGB  --  13.9   MCV  --  95   PLT  --  279   INR  --  1.06   NA  --  136   POTASSIUM 4.2 4.0   CHLORIDE  --  101   CO2  --  30   BUN  --  20   CR  --  0.90   ANIONGAP  --  5   RALPH  --  9.8   GLC  --  97   ALBUMIN  --  3.9   PROTTOTAL  --  7.5   BILITOTAL  --  0.7   ALKPHOS  --  102   ALT  --  26   AST  --  18       No results found for this or any previous visit (from the past 24 hour(s)).

## 2022-05-31 NOTE — OR NURSING
1015hr - [facility] Mike rounding on Pt; Pt w/onset of hypotension SBP 80 w/MAP 60's.  V.O: Give Albumin dose x1  Per [facility].    1100hr - [facility] updated on Pts status; all Arterial puncture sites stable; SBP fluctuating mid 80-90's w/MAP 60-70's. All Neuro checks WNL.  T.O: Okay to transfer Pt out of PACU per [facility].

## 2022-05-31 NOTE — ANESTHESIA PREPROCEDURE EVALUATION
Anesthesia Pre-Procedure Evaluation    Patient: Heather Bauer   MRN: [id number] : 1955        Procedure : Procedure(s):  Transcatheter Aortic Valve Replacement-Femoral Approach          Past Medical History:   Diagnosis Date     Aortic valve stenosis      Essential hypertension, benign      Fracture, tibia, shaft      History of colposcopy with cervical biopsy 11    AMRITA I     Papanicolaou smear of vagina with atypical squamous cells cannot exclude high grade squamous intraepithelial lesion (ASC-H) 11      Past Surgical History:   Procedure Laterality Date     CV CORONARY ANGIOGRAM N/A 3/11/2019    Procedure: Coronary Angiogram;  Surgeon: Adolph Dave MD;  Location: [facility] CARDIAC CATH LAB     CV CORONARY ANGIOGRAM N/A 2022    Procedure: Coronary Angiogram;  Surgeon: Madonna Garay MD;  Location: [facility] CARDIAC CATH LAB     CV PCI N/A 2022    Procedure: Percutaneous Coronary Intervention;  Surgeon: Madonna Garay MD;  Location: [facility] CARDIAC CATH LAB     CV PCI ANGIOPLASTY N/A 3/11/2019    Procedure: Percutaneous Coronary Intervention;  Surgeon: Adolph Dave MD;  Location: [facility] CARDIAC CATH LAB     ZZC APPENDECTOMY        Allergies   Allergen Reactions     No Known Drug Allergies       Social History     Tobacco Use     Smoking status: Former Smoker     Smokeless tobacco: Never Used     Tobacco comment: quit 20 years ago   Substance Use Topics     Alcohol use: Yes     Comment: wine every week      Wt Readings from Last 1 Encounters:   22 98.4 kg (217 lb)        Anesthesia Evaluation   Pt has had prior anesthetic. Type: MAC.    No history of anesthetic complications       ROS/MED HX  ENT/Pulmonary:     (+) tobacco use, Past use,  (-) sleep apnea   Neurologic:  - neg neurologic ROS     Cardiovascular: Comment: Echocardiogram 2022 showed mean aortic gradient of 50 mmHg in the setting of preserved LVEF  CAD (history of PCI to LAD in  2019)  Hypertension  Hyperlipidemia    (+) Dyslipidemia hypertension--CAD ---valvular problems/murmurs type: AS Previous cardiac testing   Echo: Date: Results:  Interpretation Summary     Left ventricular systolic function is normal.  The visual ejection fraction is 65-70%.  No regional wall motion abnormalities noted.  Severe valvular aortic stenosis.  The mean AoV pressure gradient is 50mmHg.  Compared to 9/21, aortic stenosis has progressed, mean gradient has increased  from 35 to 50mm. The study was technically difficult.  Stress Test: Date: Results:    ECG Reviewed: Date: Results:    Cath: Date: 5/2022 Results:  1.  Widely patent stent in the proximal LAD.   2.  Coronary arteries are otherwise angiographically normal.     METS/Exercise Tolerance:     Hematologic:       Musculoskeletal:       GI/Hepatic:    (-) GERD   Renal/Genitourinary:       Endo:     (+) Obesity,  (-) Type II DM   Psychiatric/Substance Use:  - neg psychiatric ROS     Infectious Disease:       Malignancy:       Other:            Physical Exam    Airway        Mallampati: II   TM distance: > 3 FB   Neck ROM: full   Mouth opening: > 3 cm    Respiratory Devices and Support         Dental  no notable dental history         Cardiovascular   cardiovascular exam normal       Rhythm and rate: regular   (+) murmur       Pulmonary   pulmonary exam normal        breath sounds clear to auscultation           OUTSIDE LABS:  CBC:   Lab Results   Component Value Date    WBC 6.5 05/26/2022    WBC 5.6 05/09/2022    HGB 13.9 05/26/2022    HGB 13.5 05/09/2022    HCT 41.0 05/26/2022    HCT 41.2 05/09/2022     05/26/2022     05/09/2022     BMP:   Lab Results   Component Value Date     05/26/2022     05/09/2022    POTASSIUM 4.0 05/26/2022    POTASSIUM 3.8 05/09/2022    CHLORIDE 101 05/26/2022    CHLORIDE 106 05/09/2022    CO2 30 05/26/2022    CO2 29 05/09/2022    BUN 20 05/26/2022    BUN 15 05/09/2022    CR 0.90 05/26/2022    CR 0.78  05/09/2022    GLC 97 05/26/2022     (H) 05/09/2022     COAGS:   Lab Results   Component Value Date    PTT 29 05/09/2022    INR 1.06 05/26/2022     POC: No results found for: BGM, HCG, HCGS  HEPATIC:   Lab Results   Component Value Date    ALBUMIN 3.9 05/26/2022    PROTTOTAL 7.5 05/26/2022    ALT 26 05/26/2022    AST 18 05/26/2022    ALKPHOS 102 05/26/2022    BILITOTAL 0.7 05/26/2022     OTHER:   Lab Results   Component Value Date    A1C 5.7 (H) 03/11/2019    RALPH 9.8 05/26/2022    MAG 1.9 01/25/2012    TSH 3.12 01/25/2012    CRP 23.0 (H) 04/23/2019    SED 22 04/23/2019       Anesthesia Plan    ASA Status:  3   NPO Status:  NPO Appropriate    Anesthesia Type: MAC.     - Reason for MAC: immobility needed, straight local not clinically adequate         Techniques and Equipment:       - Drips/Meds: Dexmed. infusion     Consents    Anesthesia Plan(s) and associated risks, benefits, and realistic alternatives discussed. Questions answered and patient/representative(s) expressed understanding.    - Discussed:     - Discussed with:  Patient      - Extended Intubation/Ventilatory Support Discussed: No.      - Patient is DNR/DNI Status: No    Use of blood products discussed: Yes.     - Discussed with: Patient.     - Consented: consented to blood products            Reason for refusal: other.     Postoperative Care    Pain management: IV analgesics, Oral pain medications, Multi-modal analgesia.   PONV prophylaxis: Ondansetron (or other 5HT-3)     Comments:    Other Comments: Patient is counseled on the anesthesia plan and relevant anesthesia procedures including all risks and benefits. All patient questions were answered.             Dale Pham DO

## 2022-05-31 NOTE — PROVIDER NOTIFICATION
Hospitalist ORVILLE Gomez at bedside. Notified of 1.6 sec pause, bradycardia. Asymptomic.     ekg ordered. NS bolus infusing. SBP 92

## 2022-05-31 NOTE — INTERVAL H&P NOTE
"I have reviewed the surgical (or preoperative) H&P that is linked to this encounter, and examined the patient. There are no significant changes    Clinical Conditions Present on Arrival:  Clinically Significant Risk Factors Present on Admission                  # Platelet Defect: home medication list includes an antiplatelet medication  # Obesity: Estimated body mass index is 33.12 kg/m  as calculated from the following:    Height as of this encounter: 1.727 m (5' 8\").    Weight as of this encounter: 98.8 kg (217 lb 12.8 oz).       "

## 2022-05-31 NOTE — PROGRESS NOTES
SPIRITUAL HEALTH SERVICES  [facility] Pre-Surg PACU  PRE-SURGICAL VISIT    Pre-Surgical visit with Patient Heather and her  Le.    I offered spiritual and emotional support and sang a blessing.    SH remains available.    Rev Fátima De La Cruz  Associate   Spiritual Health Phone Line [phone]  Spiritual Health Pager [phone]

## 2022-05-31 NOTE — PROVIDER NOTIFICATION
"Shaye PABLO NP aware of continued hypotension. Pt states she feels a 'little off\". Additional 250cc of 0.9NS given over 1 hr. Continue to monitor vsq15 min.  "

## 2022-06-01 ENCOUNTER — APPOINTMENT (OUTPATIENT)
Dept: PHYSICAL THERAPY | Facility: CLINIC | Age: 67
DRG: 267 | End: 2022-06-01
Attending: NURSE PRACTITIONER
Payer: COMMERCIAL

## 2022-06-01 ENCOUNTER — APPOINTMENT (OUTPATIENT)
Dept: CARDIOLOGY | Facility: CLINIC | Age: 67
DRG: 267 | End: 2022-06-01
Attending: NURSE PRACTITIONER
Payer: COMMERCIAL

## 2022-06-01 VITALS
TEMPERATURE: 98 F | RESPIRATION RATE: 16 BRPM | OXYGEN SATURATION: 99 % | HEART RATE: 70 BPM | BODY MASS INDEX: 33.16 KG/M2 | WEIGHT: 218.8 LBS | HEIGHT: 68 IN | SYSTOLIC BLOOD PRESSURE: 116 MMHG | DIASTOLIC BLOOD PRESSURE: 67 MMHG

## 2022-06-01 LAB
ANION GAP SERPL CALCULATED.3IONS-SCNC: 3 MMOL/L (ref 3–14)
BUN SERPL-MCNC: 13 MG/DL (ref 7–30)
CALCIUM SERPL-MCNC: 8.6 MG/DL (ref 8.5–10.1)
CHLORIDE BLD-SCNC: 108 MMOL/L (ref 94–109)
CO2 SERPL-SCNC: 27 MMOL/L (ref 20–32)
CREAT SERPL-MCNC: 0.75 MG/DL (ref 0.52–1.04)
ERYTHROCYTE [DISTWIDTH] IN BLOOD BY AUTOMATED COUNT: 12.9 % (ref 10–15)
GFR SERPL CREATININE-BSD FRML MDRD: 88 ML/MIN/1.73M2
GLUCOSE BLD-MCNC: 99 MG/DL (ref 70–99)
HCT VFR BLD AUTO: 35.3 % (ref 35–47)
HGB BLD-MCNC: 11.6 G/DL (ref 11.7–15.7)
LVEF ECHO: NORMAL
MAGNESIUM SERPL-MCNC: 2.2 MG/DL (ref 1.6–2.3)
MCH RBC QN AUTO: 31.8 PG (ref 26.5–33)
MCHC RBC AUTO-ENTMCNC: 32.9 G/DL (ref 31.5–36.5)
MCV RBC AUTO: 97 FL (ref 78–100)
PHOSPHATE SERPL-MCNC: 3.4 MG/DL (ref 2.5–4.5)
PLATELET # BLD AUTO: 204 10E3/UL (ref 150–450)
POTASSIUM BLD-SCNC: 4.4 MMOL/L (ref 3.4–5.3)
RBC # BLD AUTO: 3.65 10E6/UL (ref 3.8–5.2)
SODIUM SERPL-SCNC: 138 MMOL/L (ref 133–144)
WBC # BLD AUTO: 7.8 10E3/UL (ref 4–11)

## 2022-06-01 PROCEDURE — 99239 HOSP IP/OBS DSCHRG MGMT >30: CPT | Performed by: INTERNAL MEDICINE

## 2022-06-01 PROCEDURE — 83735 ASSAY OF MAGNESIUM: CPT | Performed by: NURSE PRACTITIONER

## 2022-06-01 PROCEDURE — 97110 THERAPEUTIC EXERCISES: CPT | Mod: GP

## 2022-06-01 PROCEDURE — 97161 PT EVAL LOW COMPLEX 20 MIN: CPT | Mod: GP

## 2022-06-01 PROCEDURE — 93325 DOPPLER ECHO COLOR FLOW MAPG: CPT | Mod: 26 | Performed by: INTERNAL MEDICINE

## 2022-06-01 PROCEDURE — 99233 SBSQ HOSP IP/OBS HIGH 50: CPT | Mod: 25 | Performed by: INTERNAL MEDICINE

## 2022-06-01 PROCEDURE — 36415 COLL VENOUS BLD VENIPUNCTURE: CPT | Performed by: NURSE PRACTITIONER

## 2022-06-01 PROCEDURE — 85027 COMPLETE CBC AUTOMATED: CPT | Performed by: NURSE PRACTITIONER

## 2022-06-01 PROCEDURE — 255N000002 HC RX 255 OP 636: Performed by: INTERNAL MEDICINE

## 2022-06-01 PROCEDURE — 84100 ASSAY OF PHOSPHORUS: CPT | Performed by: NURSE PRACTITIONER

## 2022-06-01 PROCEDURE — 93005 ELECTROCARDIOGRAM TRACING: CPT

## 2022-06-01 PROCEDURE — 93321 DOPPLER ECHO F-UP/LMTD STD: CPT | Mod: 26 | Performed by: INTERNAL MEDICINE

## 2022-06-01 PROCEDURE — 93010 ELECTROCARDIOGRAM REPORT: CPT | Performed by: INTERNAL MEDICINE

## 2022-06-01 PROCEDURE — 250N000013 HC RX MED GY IP 250 OP 250 PS 637: Performed by: NURSE PRACTITIONER

## 2022-06-01 PROCEDURE — 97530 THERAPEUTIC ACTIVITIES: CPT | Mod: GP

## 2022-06-01 PROCEDURE — 93325 DOPPLER ECHO COLOR FLOW MAPG: CPT

## 2022-06-01 PROCEDURE — 82310 ASSAY OF CALCIUM: CPT | Performed by: NURSE PRACTITIONER

## 2022-06-01 PROCEDURE — 93308 TTE F-UP OR LMTD: CPT | Mod: 26 | Performed by: INTERNAL MEDICINE

## 2022-06-01 RX ORDER — LISINOPRIL 5 MG/1
5 TABLET ORAL DAILY
Qty: 30 TABLET | Refills: 11 | Status: SHIPPED | OUTPATIENT
Start: 2022-06-01 | End: 2023-05-26

## 2022-06-01 RX ORDER — LISINOPRIL 5 MG/1
5 TABLET ORAL DAILY
Status: DISCONTINUED | OUTPATIENT
Start: 2022-06-01 | End: 2022-06-01 | Stop reason: HOSPADM

## 2022-06-01 RX ADMIN — HUMAN ALBUMIN MICROSPHERES AND PERFLUTREN 9 ML: 10; .22 INJECTION, SOLUTION INTRAVENOUS at 09:58

## 2022-06-01 RX ADMIN — METOPROLOL SUCCINATE 12.5 MG: 25 TABLET, EXTENDED RELEASE ORAL at 09:58

## 2022-06-01 RX ADMIN — ASPIRIN 81 MG: 81 TABLET, COATED ORAL at 08:23

## 2022-06-01 RX ADMIN — LISINOPRIL 5 MG: 5 TABLET ORAL at 09:58

## 2022-06-01 ASSESSMENT — ACTIVITIES OF DAILY LIVING (ADL)
ADLS_ACUITY_SCORE: 20

## 2022-06-01 NOTE — PLAN OF CARE
Cardiac Rehab Discharge Summary    Reason for therapy discharge:    Discharged to home with outpatient therapy.    Progress towards therapy goal(s). See goals on Care Plan in [facility] electronic health record for goal details.  Goals partially met.  Barriers to achieving goals:   discharge from facility.    Therapy recommendation(s):    Continued therapy is recommended.  Rationale/Recommendations : Anticipate when pt is medically ready, pt may return home with assist from spouse as needed and continued OP CR to progress activity tolerance.  PT Brief overview of current status: SBA bed mobility, Amb with no AD, SBA-ind. slight Hypertensive response to exercise.

## 2022-06-01 NOTE — DISCHARGE INSTRUCTIONS
TAVR Discharge Instructions  You have just had your aortic valve replaced with a new biological tissue valve. You should feel better after your surgery, but complete recovery may take several weeks. Please follow these instructions carefully and please call your valve coordinator with any questions or concerns.      CONTACT INFORMATION:   [facility]:  [phone] (7 days a week)  Scheduling and general questions - Melany ([phone])  Valve Coordinators - Joan RN ([phone]), Digna RN ([phone]), and Saray RN ([phone])    AFTER YOU GO HOME:  Drink extra fluids for 2 days.  You may resume your normal diet.  Do not smoke.  Do not drink alcohol.  Relax and take it easy.  Do not make any important or legal decisions.    FOR 1 WEEK AFTER TAVR:  Do not drive or operate machines at home or at work. No driving until you return for your 1 week follow-up appointment. You and the provider will discuss this at the appointment.   Refrain from sexual intercourse for 1 week.  You may shower the day after your procedure. Do NOT take a bath, or use a hot tub or pool for at least 1 week. Do NOT scrub the site. Do not use lotion or powder near the puncture site.  Do not lift more than 10 pounds.   Do not do any heavy activity such as exercise, lifting, or straining.  No housework, yard work, or any activities that make you sweat.    CARE OF WRIST AND GROIN SITES:  For 2 days, when you cough, sneeze, laugh or move your bowels, hold your hand over the puncture site and press firmly on/above the site.  Remove the bandage after 24 hours. If there is minor oozing, apply another bandage and remove it after 12 hours.  It is normal to have bruising or pea size lump at the site.  If you have a wrist site puncture: Do not use your hand or arm to support your weight (such as rising from a chair) or bend your wrist (such as lifting a garage door) for 1 week.  No stooping or squatting for 1 week.      BLEEDING:  If you start bleeding from the site in your wrist:  Sit down and press firmly on/above the site with your fingers for 10 minutes.   Once bleeding stops, keep your arm still for 2 hours.   Call [facility] or valve coordinator as soon as you can.  If you start bleeding from the site in your groin:  Lie down flat and press firmly on/above the site for 10 minutes.  Once bleeding stops lay flat for 2 hours.   Call [facility] or valve coordinator as soon as you can.  Call 911 right away if you have heavy bleeding or bleeding that does not stop.    MEDICINES:  You may be started on an anti-platelet medication, such as Plavix or aspirin. Please call the [facility] with any questions regarding this medication.  If you have stopped any medicines, check with your provider about when to restart them.  You will require lifetime prophylactic antibiotics for dental cleanings and dental work after your TAVR, please inquire with the Heart Clinic prior to your scheduled cleanings.     FOLLOW-UP APPOINTMENTS:  Follow-up with a Structural Heart advanced practice provider (NP or PA) at [facility] in 7-10 days after your procedure.  You will also follow-up at [facility] 1 month after your procedure which will include a visit with an advanced practice provider an echocardiogram (echo), an electrocardiogram (ECG), and lab work. This follow-up should be scheduled for you prior to you leaving the hospital.  Cardiac Rehab will contact you for follow up care. You can enroll at a site convenient to you.  We will have you see your primary cardiologist about 6 months after your TAVR.  We will see you 1 year after your TAVR with a visit with an advanced practice provider (NP or PA) an echocardiogram (echo), electrocardiogram (ECG), and lab work.     CALL THE CLINIC IF:   You have increased pain or a large or growing hard lump  around the site.  The site is red, swollen, hot, or tender.  Blood or fluid is draining from the site.  You have chills or a fever greater than 101 F (38 C).  Your arm or leg feels numb, cool, or changes color.  You have hives, a rash, or unusual itching.  New pain in the back or belly that you cannot control with Tylenol.  Any questions or concerns.    OTHER INSTRUCTIONS:  You will receive a card with your new valve information in the mail, directly from the . Retain this card with your health care records.    DENTAL WORK:  You must have antibiotics before all dental work to prevent endocarditis, or an infection on your new heart valve. Please call the valve coordinators to get a prescription for this. Please do not schedule any dental cleanings for at least 3-6 months after your TAVR.

## 2022-06-01 NOTE — PROVIDER NOTIFICATION
MD Notification    Notified Person: MD    Notified Person Name: Dr. Jimenez    Notification Date/Time:5/31/2022 at 2011    Notification Interaction:AWAITING ORDER    Purpose of Notification:Pt is asking for a stool softener for constipation.    Orders Received:    Comments:

## 2022-06-01 NOTE — DISCHARGE SUMMARY
[facility]  Hospitalist Discharge Summary      Date of Admission:  5/31/2022  Date of Discharge:  6/1/2022  Discharging Provider: Dante Castro MD, MD  Discharge Service: Hospitalist Service    Discharge Diagnoses     1) Severe symptomatic aortic stenosis s/p TAVR with 23 mm ES3 valve     2) Chronic diastolic heart failure 2/2 valvular disease, NYHA class I, Stage B      3) CAD s/p PCI to LAD in 2019  4) Hypertension    5) Hyperlipidemia     Follow-ups Needed After Discharge   Follow-up Appointments     Follow-up and recommended labs and tests       Follow up with Cardiology within 1 week as planned         {    Unresulted Labs Ordered in the Past 30 Days of this Admission     No orders found for last 31 day(s).          Discharge Disposition   Discharged to home  Condition at discharge: Stable    Hospital Course   Heather Bauer is a 67 year old female with a history of CAD s/p PCI to LAD 2019, HTN, HLD, severe AS who presents 5/31/2022 for planned TAVR.      Severe aortic stenosis s/p TAVR 5/31/2022  Presents for TAVR in the setting of severe AS with progressive dyspnea on exertion.   Procedure was performed by Dr Garay utilizing MAC.   --- via RCFA, no groin access issues  - R radial used for sentinel  - No conduction abnormalities, EKG shows sinus rhythm   - Neurologically intact   - Post-procedure echo stable so she was discharged home to follow-up in cardiology clinic     Coronary artery disease s/p PCI to LAD 2019  Pre-TAVR coronary angiogram stable without new lesions.   ECHO shows EF 65-70%   PTA: asa 81mg, lipitor 40mg, metoprolol 12.5mg daily     HTN  PTA: furosemide 40mg daily, lisinopril 10mg daily, metoprolol 12.5mg daily  -- As per cardiology: Lasix was discontinued on discharge and lisinopril was decreased to 5 mg/day     HLD  Continue statin      Consultations This Hospital Stay   CARDIAC REHAB IP CONSULT  CARE MANAGEMENT / SOCIAL WORK IP CONSULT  HOSPITALIST IP  "CONSULT    Code Status   Full Code    Time Spent on this Encounter   I, Dante Castro MD, MD, personally saw the patient today and spent greater than 30 minutes discharging this patient.       Dante Castro MD, MD  [facility]  [address]  [place] [address]  Phone: [phone]  ______________________________________________________________________    Physical Exam   Vital Signs: Temp: 98  F (36.7  C) Temp src: Oral BP: 116/67 Pulse: 70   Resp: 16 SpO2: 99 % O2 Device: None (Room air)    Weight: 218 lbs 12.8 oz  /67   Pulse 70   Temp 98  F (36.7  C) (Oral)   Resp 16   Ht 1.727 m (5' 8\")   Wt 99.2 kg (218 lb 12.8 oz)   LMP 07/07/2008   SpO2 99%   BMI 33.27 kg/m    Gen- pleasant lying in bed  HEENT- NAD, LEXY  Neck- supple, no JVD elevation, no thyromegaly  CVS- I+II+ no m/r/g  RS- CTAB  Abdo- soft, no tenderness . No g/r/r   Ext- no edema        Primary Care Physician   Sylvia Bird    Discharge Orders      CARDIAC REHAB REFERRAL      Reason for your hospital stay    Heather Bauer is a 67 year old female with severe bicuspid aortic stenosis who was admitted on 5/31/2022 for elective transcather aortic valve replacement. Prior to procedure patient noted to have YUKO 0.75 cm2, MG 50 mmHg, and peak velocity 4.2 m/s. LVEF 65-70%. Other notable past medical history is significant for coronary artery disease s/p PCI to LAD in 2019, chronic diastolic heart failure, hypertension, and hyperlipidemia.     Follow-up and recommended labs and tests     Follow up with Cardiology within 1 week as planned     Activity    Your activity upon discharge: activity as tolerated     When to contact your care team    Call your primary doctor if you have any of the following: temperature greater than 100.4 or less than 96,  increased shortness of breath, increased swelling, increased pain, or weight gain >5 pounds.     Discharge Instructions    Plan:  1. Continue " antiplatelet therapy with aspirin 81 mg daily.    2. Resume Toprol XL 12.5 mg daily.   3. Will resume lisinopril at 5 mg daily, decreased from PTA 10 mg daily.   4. Discontinue PTA lasix at discharge as patient appears quite euvolemic with preserved LV function.   5. Outpatient cardiac rehab.  6. Lifelong antibiotic therapy prior to any dental procedure.   7. Follow-up with cardiology in approximately 1-week.     Diet    Follow this diet upon discharge: Orders Placed This Encounter      Combination Diet Regular Diet       Significant Results and Procedures   Results for orders placed or performed during the hospital encounter of 22   Echocardiogram Limited     Value    LVEF  60%    Narrative    [id number]  [id number]  [id number]  [id number]^MADELIN^ALEK^LOVE     [facility]  Echocardiography Laboratory  [address] [place] [address]     Name: ABBY ASHTON  MRN: [id number]  : 1955  Study Date: 2022 07:54 AM  Age: 67 yrs  Gender: Female  Patient Location: [facility]  Reason For Study: 23 mm ES3 TAVR  Ordering Physician: ALEK YUSUF  Referring Physician: SANDY LARA  Performed By: Jaxson Rothman RDCS     BSA: 2.1 m2  Height: 68 in  Weight: 217 lb  HR: 74  BP: 104/68 mmHg  ______________________________________________________________________________  Procedure  Limited Portable Echo Adult. Optison (NDC #[id number]) given intravenously.  ______________________________________________________________________________  Interpretation Summary     Technically difficult study limited views obtained due to body habitus  The visual ejection fraction is estimated at 60%.  Right ventricular systolic pressure is elevated, consistent with mild  pulmonary hypertension.  The gradient is normal for this prosthetic aortic valve.  23-mm Crisostomo Mic 3 Ultra valve--TAVR gradient post op day 1 is 11mmHg (was  5mmHg post procedure)  There is physiologic aortic regurgitation.  AI trivial  "may be \"valvular\"  ______________________________________________________________________________  Left Ventricle  The left ventricle is normal in size. There is normal left ventricular wall  thickness. The visual ejection fraction is estimated at 60%. Normal left  ventricular wall motion.     Right Ventricle  The right ventricle is not well visualized. The right ventricle is normal in  size and function.     Atria  The left atrium is not well visualized. The left atrium is borderline dilated.  Right atrial size is normal.     Mitral Valve  The mitral valve leaflets appear normal. There is no evidence of stenosis,  fluttering, or prolapse.     Tricuspid Valve  Normal tricuspid valve. There is trace tricuspid regurgitation. Right  ventricular systolic pressure is elevated, consistent with mild pulmonary  hypertension. IVC diameter and respiratory changes fall into an intermediate  range suggesting an RA pressure of 8 mmHg.     Aortic Valve  There is physiologic aortic regurgitation. AI trivial may be \"valvular\". The  mean AoV pressure gradient is 11.5 mmHg. 23-mm Crisostomo Mic 3 Ultra valve--  TAVR gradient post op day 1 is 11mmHg (was 5mmHg post procedure). The gradient  is normal for this prosthetic aortic valve.     Pericardium  There is no pericardial effusion.     Rhythm  Sinus rhythm was noted.     ______________________________________________________________________________  MMode/2D Measurements & Calculations  IVSd: 0.91 cm  LVIDd: 3.8 cm  LVIDs: 2.7 cm  LVPWd: 0.76 cm  FS: 28.6 %  LV mass(C)d: 91.2 grams  LV mass(C)dI: 43.1 grams/m2  asc Aorta Diam: 3.2 cm  LVOT diam: 2.0 cm  LVOT area: 3.1 cm2  RWT: 0.40     Doppler Measurements & Calculations  Ao V2 max: 232.8 cm/sec  Ao max P.0 mmHg  Ao V2 mean: 156.1 cm/sec  Ao mean P.5 mmHg  Ao V2 VTI: 51.1 cm  YUKO(I,D): 1.5 cm2  YUKO(V,D): 1.4 cm2  Ao acc time: 0.10 sec  LV V1 max P.8 mmHg  LV V1 max: 110.1 cm/sec  LV V1 VTI: 25.7 cm  SV(LVOT): 78.7 " "ml  SI(LVOT): 37.2 ml/m2  TR max michael: 256.5 cm/sec  TR max P.4 mmHg  AV Michael Ratio (DI): 0.47  YUKO Index (cm2/m2): 0.73     ______________________________________________________________________________  Report approved by: Arden Larose 2022 10:51 AM         Cardiac Catheterization    Narrative    Successful transfemoral transcatheter aortic valve replacement with a 23mm   Crisostomo Mic 3 Ultra valve.       Discharge Medications   Current Discharge Medication List      CONTINUE these medications which have CHANGED    Details   lisinopril (ZESTRIL) 5 MG tablet Take 1 tablet (5 mg) by mouth daily  Qty: 30 tablet, Refills: 11    Associated Diagnoses: Nonrheumatic aortic valve stenosis         CONTINUE these medications which have NOT CHANGED    Details   aspirin 81 MG EC tablet Take 81 mg by mouth daily      atorvastatin (LIPITOR) 40 MG tablet Take 1 tablet (40 mg) by mouth daily  Qty: 90 tablet, Refills: 1    Associated Diagnoses: Coronary artery disease involving native coronary artery of native heart without angina pectoris      metoprolol succinate ER (TOPROL XL) 25 MG 24 hr tablet Take 0.5 tablets (12.5 mg) by mouth daily  Qty: 45 tablet, Refills: 1    Associated Diagnoses: Coronary artery disease involving native coronary artery of native heart without angina pectoris      MULTIPLE MINERALS-VITAMINS OR TABS Take 1 tablet by mouth daily      nitroGLYcerin (NITROSTAT) 0.4 MG sublingual tablet One tablet under the tongue every 5 minutes if needed for chest pain. May repeat every 5 minutes for a maximum of 3 doses in 15 minutes\"  Qty: 25 tablet, Refills: 3    Associated Diagnoses: Postsurgical percutaneous transluminal coronary angioplasty status; Coronary artery disease of native artery of native heart with stable angina pectoris (H)         STOP taking these medications       furosemide (LASIX) 40 MG tablet Comments:   Reason for Stopping:         potassium chloride ER (KLOR-CON M) 20 MEQ CR " tablet Comments:   Reason for Stopping:             Allergies   Allergies   Allergen Reactions     No Known Drug Allergies

## 2022-06-01 NOTE — PROGRESS NOTES
[facility]  Structural Heart Progress Note     Date of Service: 06/01/22     [facility]  Heather Bauer is a 67 year old female with severe bicuspid aortic stenosis who was admitted on 5/31/2022 for elective transcather aortic valve replacement. Prior to procedure patient noted to have YUKO 0.75 cm2, MG 50 mmHg, and peak velocity 4.2 m/s. LVEF 65-70%. Other notable past medical history is significant for coronary artery disease s/p PCI to LAD in 2019, chronic diastolic heart failure, hypertension, and hyperlipidemia.     Assessment:   1) Severe symptomatic aortic stenosis s/p TAVR with 23 mm ES3 valve  - via RCFA, no groin access issues  - R radial used for sentinel  - No conduction abnormalities, EKG shows sinus rhythm   - Neurologically intact   - Post-procedure echo pending   - Tolerating cardiac rehab  - On aspirin 81 mg daily for antiplatelet therapy     2) Chronic diastolic heart failure 2/2 valvular disease, NYHA class I, Stage B   - NTpBNP 492, LVEDP 8 mmHg  - Compensated on exam, holding PTA lasix 40 mg daily      3) CAD s/p PCI to LAD in 2019  - Pre-TAVR coronary angiogram showed patent stent and no obstructive disease     4) Hypertension  - PTA lisinopril and Toprol XL held due to transient hypotension post-procedure     5) Hyperlipidemia   - LDL 43 (3/2022)   - On PTA atorvastatin 40 mg daily     Plan:  1. Continue antiplatelet therapy with aspirin 81 mg daily.    2. Resume Toprol XL 12.5 mg daily.   3. Will resume lisinopril at 5 mg daily, decreased from PTA 10 mg daily.   4. Discontinue PTA lasix at discharge as patient appears quite euvolemic with preserved LV function.   5. Outpatient cardiac rehab.  6. Lifelong antibiotic therapy prior to any dental procedure.   7. Pending echocardiogram, patient OK to discharge home from a cardiovascular standpoint.   8. Follow-up with cardiology in approximately 1-week.     Patient was seen and discussed with Dr. Cali who agrees with the above  plan.     Shaye Ag, PAPO, APRN, CNP  Page: [phone]    Interval History: No acute events overnight. Denies CP, SOB, dizziness, PND, or orthopnea. Bilateral groin sites intact. Hemodynamically stable. No arrhythmias. NSR on telemetry. Renal function and lytes normal.     Physical Exam   Temp: 97.7  F (36.5  C) Temp src: Oral BP: 104/65 Pulse: 70   Resp: 17 SpO2: 97 % O2 Device: None (Room air) Oxygen Delivery: 3 LPM  Vitals:    05/31/22 0603 06/01/22 0557   Weight: 98.8 kg (217 lb 12.8 oz) 99.2 kg (218 lb 12.8 oz)     Vital Signs with Ranges  Temp:  [97.2  F (36.2  C)-97.7  F (36.5  C)] 97.7  F (36.5  C)  Pulse:  [52-80] 70  Resp:  [8-28] 17  BP: ()/(51-70) 104/65  Cuff Mean (mmHg):  [72] 72  SpO2:  [90 %-100 %] 97 %  I/O last 3 completed shifts:  In: 2100 [I.V.:1850]  Out: 1200 [Urine:1200]    Constitutional: awake, alert, cooperative, no apparent distress, and appears stated age  Eyes: pupils equal, round and reactive to light  Respiratory: No increased work of breathing, good air exchange, clear to auscultation bilaterally, no crackles or wheezing  Cardiovascular: normal S1 and S2, no murmur noted and no edema  GI: soft  Skin: no bruising or bleeding, normal skin color, texture, turgor, bilateral groin sites intact without bleeding or bruit.   Musculoskeletal: no lower extremity pitting edema present  full range of motion noted  tone is normal  Neurologic: Mental Status Exam:  Level of Alertness:   awake  Orientation:   person, place, time  Attention/Concentration:  normal  Motor Exam:  moves all extremities well and symmetrically  Neuropsychiatric: General: normal, calm and normal eye contact  Affect: normal and pleasant    Medications       sodium chloride 0.9%  250 mL Intravenous Once     aspirin  81 mg Oral Daily     atorvastatin  40 mg Oral QPM       Data   Results for orders placed or performed during the hospital encounter of 05/31/22 (from the past 24 hour(s))   Activated clotting time  celite, POCT   Result Value Ref Range    Activated Clotting Time (Celite) POCT 126 74 - 150 seconds   Cardiac Catheterization    Narrative    Successful transfemoral transcatheter aortic valve replacement with a 23mm   Crisostomo Mic 3 Ultra valve.   EKG 12-lead, tracing only   Result Value Ref Range    Systolic Blood Pressure  mmHg    Diastolic Blood Pressure  mmHg    Ventricular Rate 59 BPM    Atrial Rate 59 BPM    NV Interval 178 ms    QRS Duration 86 ms     ms    QTc 439 ms    P Axis 55 degrees    R AXIS 34 degrees    T Axis 66 degrees    Interpretation ECG       Sinus bradycardia  Otherwise normal ECG  When compared with ECG of 09-MAY-2022 09:25,  No significant change was found     EKG 12-lead, tracing only   Result Value Ref Range    Systolic Blood Pressure  mmHg    Diastolic Blood Pressure  mmHg    Ventricular Rate 55 BPM    Atrial Rate 55 BPM    NV Interval 166 ms    QRS Duration 86 ms     ms    QTc 438 ms    P Axis 60 degrees    R AXIS 52 degrees    T Axis 65 degrees    Interpretation ECG       Sinus bradycardia  Otherwise normal ECG  When compared with ECG of 31-MAY-2022 10:05, (unconfirmed)  No significant change was found     Glucose by meter   Result Value Ref Range    GLUCOSE BY METER POCT 71 70 - 99 mg/dL   Hemoglobin   Result Value Ref Range    Hemoglobin 11.7 11.7 - 15.7 g/dL   CBC with platelets   Result Value Ref Range    WBC Count 7.8 4.0 - 11.0 10e3/uL    RBC Count 3.65 (L) 3.80 - 5.20 10e6/uL    Hemoglobin 11.6 (L) 11.7 - 15.7 g/dL    Hematocrit 35.3 35.0 - 47.0 %    MCV 97 78 - 100 fL    MCH 31.8 26.5 - 33.0 pg    MCHC 32.9 31.5 - 36.5 g/dL    RDW 12.9 10.0 - 15.0 %    Platelet Count 204 150 - 450 10e3/uL   Basic metabolic panel   Result Value Ref Range    Sodium 138 133 - 144 mmol/L    Potassium 4.4 3.4 - 5.3 mmol/L    Chloride 108 94 - 109 mmol/L    Carbon Dioxide (CO2) 27 20 - 32 mmol/L    Anion Gap 3 3 - 14 mmol/L    Urea Nitrogen 13 7 - 30 mg/dL    Creatinine 0.75 0.52 - 1.04  mg/dL    Calcium 8.6 8.5 - 10.1 mg/dL    Glucose 99 70 - 99 mg/dL    GFR Estimate 88 >60 mL/min/1.73m2   Magnesium   Result Value Ref Range    Magnesium 2.2 1.6 - 2.3 mg/dL   Phosphorus   Result Value Ref Range    Phosphorus 3.4 2.5 - 4.5 mg/dL   EKG 12-lead, tracing only   Result Value Ref Range    Systolic Blood Pressure  mmHg    Diastolic Blood Pressure  mmHg    Ventricular Rate 71 BPM    Atrial Rate 71 BPM    TX Interval 154 ms    QRS Duration 74 ms     ms    QTc 404 ms    P Axis 60 degrees    R AXIS 47 degrees    T Axis 66 degrees    Interpretation ECG       Sinus rhythm  Normal ECG  When compared with ECG of 31-MAY-2022 13:47, (unconfirmed)  No significant change was found              Yes

## 2022-06-01 NOTE — PROGRESS NOTES
06/01/22 0830   Quick Adds   Type of Visit Initial PT Evaluation   Living Environment   People in Home spouse   Current Living Arrangements house   Home Accessibility stairs to enter home;stairs within home   Number of Stairs, Main Entrance 2   Stair Railings, Main Entrance railings safe and in good condition   Number of Stairs, Within Home, Primary greater than 10 stairs   Stair Railings, Within Home, Primary railings safe and in good condition   Transportation Anticipated family or friend will provide   Living Environment Comments stairs to bedroom and basement where the laundry is. Pt's spouse will be at home to help out as needed.   Self-Care   Usual Activity Tolerance moderate   Current Activity Tolerance fair   Regular Exercise Yes   Activity/Exercise Type walking   Equipment Currently Used at Home none   Fall history within last six months no   Activity/Exercise/Self-Care Comment Pt reports goes for walks in the summer but hasn't been walking much in the winter. Pt is ind with ADLs and IADLs at baseline, no use of AD.   General Information   Onset of Illness/Injury or Date of Surgery 05/31/22   Referring Physician Shaye Ag CNP   Patient/Family Therapy Goals Statement (PT) Planning on going home and starting up OP cardiac rehab.   Pertinent History of Current Problem (include personal factors and/or comorbidities that impact the POC) The patient is a 67-year-old female with worsening exertional dyspnea.  On further evaluation, she was found to have severe aortic valve stenosis.  Following discussion of risks and benefits, she has proceeded with transcatheter valve implantation on 5/31. Pt is POD#1.   Existing Precautions/Restrictions cardiac;lifting   Cognition   Affect/Mental Status (Cognition) WFL   Orientation Status (Cognition) oriented x 4   Pain Assessment   Patient Currently in Pain No   Integumentary/Edema   Integumentary/Edema Comments Incision sites CDI. R radial, bilateral groin sites.    Posture    Posture Forward head position   Range of Motion (ROM)   ROM Comment WFL grossly   Strength (Manual Muscle Testing)   Strength (Manual Muscle Testing) Able to perform R SLR;Able to perform L SLR;strength is WFL   Bed Mobility   Comment, (Bed Mobility) Supine HOB flat>sitting EOB, SBA.   Transfers   Comment, (Transfers) Sit<>stand, SBA- ind.   Gait/Stairs (Locomotion)   Distance in Feet (Required for LE Total Joints) 5' eval + 300' treatment   Comment, (Gait/Stairs) Amb in hallway, no AD, SBA. Pt reports OA of the R knee causes unequal step lengths, no LOB.   Balance   Balance Comments Able to maintain seated balance ind. Able to maintain standing balance, ind.   Sensory Examination   Sensory Perception patient reports no sensory changes   Clinical Impression   Criteria for Skilled Therapeutic Intervention Yes, treatment indicated   PT Diagnosis (PT) Impaired activity tolerance   Influenced by the following impairments decreased activity tolerance, decreased functional strength, impaired mobility   Functional limitations due to impairments impaired functional independence, impaired ADLs and IADLs   Clinical Presentation (PT Evaluation Complexity) Stable/Uncomplicated   Clinical Presentation Rationale per MR and clinical judgement.   Clinical Decision Making (Complexity) low complexity   Planned Therapy Interventions (PT) progressive activity/exercise;risk factor education;home program guidelines;stair training;patient/family education;bed mobility training   Risk & Benefits of therapy have been explained evaluation/treatment results reviewed;care plan/treatment goals reviewed;risks/benefits reviewed;current/potential barriers reviewed;participants voiced agreement with care plan;participants included;patient   PT Discharge Planning   PT Discharge Recommendation (DC Rec) home with outpatient cardiac rehab;home with assist   PT Rationale for DC Rec Anticipate when pt is medically ready, pt may return home  with assist from spouse as needed and continued OP CR to progress activity tolerance.   PT Brief overview of current status SBA bed mobility, Amb with no AD, SBA-ind. Normal CV response to exercise.   Total Evaluation Time   Total Evaluation Time (Minutes) 4   Physical Therapy Goals   PT Frequency 2x/day   PT Predicted Duration/Target Date for Goal Attainment 06/06/22   PT Goals Cardiac Phase 1   PT: Understanding of cardiac education to maximize quality of life, condition management, and health outcomes Patient   PT: Perform aerobic activity with stable cardiovascular response continuous;10 minutes;ambulation   PT: Functional/aerobic ambulation tolerance with stable cardiovascular response in order to return to home and community environment Supervision/SBA;Greater than 300 feet   PT: Navigation of stairs simulating home set up with stable cardiovascular response in order to return to home and community environment Supervision/SBA;5 stairs;Rail on both sides

## 2022-06-01 NOTE — PLAN OF CARE
Goal Outcome Evaluation:    Plan of Care Reviewed With: patient, spouse      Discharge home with , denies pain, walked with cardiac rehab, independent in the room. Taver sites are dry and intact no bleeding no hematoma. VSS.

## 2022-06-01 NOTE — PLAN OF CARE
Goal Outcome Evaluation: Pt is A7Ox4, denies pain, VSS and on tele SR, on RA and LS clear, up with SBA, R radial, R &L groin sites C/D/I and good pulses, plan for Echo in AM and possible discharge home.    Plan of Care Reviewed With: patient, spouse     Overall Patient Progress: improving

## 2022-06-01 NOTE — CARE PLAN
AOX4, groin and radial sites stable, pulses wdl. Neuro intact. Denies pain, endorses general discomfort she thinks is caused by the bed. Plan for echo today and possible discharge to home.

## 2022-06-01 NOTE — PROGRESS NOTES
Primary Cardiologist: Dr. Dave    Reason for Visit: 1 week post TAVR follow up     History of Present Illness:   Heather is a pleasant 67 year old female with past medical history notable for history of severe symptomatic aortic stenosis (s/p TAVR, received 23 mm ES3 valve with post procedure echo showing normal valve function (mean gradient 11 mmHg with no significant PVL)), CAD (history of PCI to LAD in 2019), hypertension, and hyperlipidemia.    Heather returns to clinic today, stating she feels OK. She has been dealing with SOB. She has no orthopnea, PND, peripheral edema, lightheadedness, syncope, CP, palpitations, or bleeding issues. Her pta lasix 40 mg was stopped post TAVR as she was felt to be euovolemic. She also wonders if anxiety is playing a part in this.     Her post TAVR course otherwise was uneventful. Echocardiogram showed good function of her valve. She was kept on aspirin 81 mg daily for antiplatelet therapy. Her BB was resumed after determining she had no conduction issues post TAVR. Furosemide was discontinued as she appeared euvolemic.       Assessment and Plan:  Heather is a pleasant 67 year old female with past medical history notable for history of severe symptomatic aortic stenosis (s/p TAVR, received 23 mm ES3 valve with post procedure echo showing normal valve function (mean gradient 11 mmHg with no significant PVL)), CAD (history of PCI to LAD in 2019), hypertension, and hyperlipidemia.    On exam she appears euvolemic but her weight is up significantly compared to last clinic visit.  We will trial with low-dose Lasix at this time.  I will have her take 20 mg daily for the next 3 to 4 days.  She will contact her clinic for update.  Her vitals are stable.  She has no acute symptoms to warrant earlier evaluation with echocardiogram.  I will have her stop by lab today for BMP and CBC.  She is on baby aspirin which she has taken for quite some time.  She has no new medications since her  "TAVR.    She will otherwise return to clinic in 3 weeks with repeat labs, ECG, and echocardiogram. She will continue with aspirin. I reminded her of SBE prophylaxis.     45 minutes spent on the date of the encounter with chart review, patient visit, care coordination, and documentation.      This note was completed in part using Dragon voice recognition software. Although reviewed after completion, some word and grammatical errors may occur.    Orders this Visit:  No orders of the defined types were placed in this encounter.    No orders of the defined types were placed in this encounter.    There are no discontinued medications.      No diagnosis found.    CURRENT MEDICATIONS:  Current Outpatient Medications   Medication Sig Dispense Refill     aspirin 81 MG EC tablet Take 81 mg by mouth daily       atorvastatin (LIPITOR) 40 MG tablet Take 1 tablet (40 mg) by mouth daily 90 tablet 1     lisinopril (ZESTRIL) 5 MG tablet Take 1 tablet (5 mg) by mouth daily 30 tablet 11     metoprolol succinate ER (TOPROL XL) 25 MG 24 hr tablet Take 0.5 tablets (12.5 mg) by mouth daily 45 tablet 1     MULTIPLE MINERALS-VITAMINS OR TABS Take 1 tablet by mouth daily       nitroGLYcerin (NITROSTAT) 0.4 MG sublingual tablet One tablet under the tongue every 5 minutes if needed for chest pain. May repeat every 5 minutes for a maximum of 3 doses in 15 minutes\" 25 tablet 3       ALLERGIES     Allergies   Allergen Reactions     No Known Drug Allergies        PAST MEDICAL HISTORY:  Past Medical History:   Diagnosis Date     Aortic valve stenosis      Essential hypertension, benign      Fracture, tibia, shaft      History of colposcopy with cervical biopsy 2/22/11    AMRITA I     Papanicolaou smear of vagina with atypical squamous cells cannot exclude high grade squamous intraepithelial lesion (ASC-H) 1/31/11       PAST SURGICAL HISTORY:  Past Surgical History:   Procedure Laterality Date     CV CORONARY ANGIOGRAM N/A 3/11/2019    Procedure: " Coronary Angiogram;  Surgeon: Adolph Dave MD;  Location: [facility] CARDIAC CATH LAB     CV CORONARY ANGIOGRAM N/A 5/9/2022    Procedure: Coronary Angiogram;  Surgeon: Madonna Garay MD;  Location: [facility] CARDIAC CATH LAB     CV PCI N/A 5/9/2022    Procedure: Percutaneous Coronary Intervention;  Surgeon: Madonna Garay MD;  Location: [facility] CARDIAC CATH LAB     CV PCI ANGIOPLASTY N/A 3/11/2019    Procedure: Percutaneous Coronary Intervention;  Surgeon: Adolph Dave MD;  Location:  HEART CARDIAC CATH LAB     ZZC APPENDECTOMY         FAMILY HISTORY:  Family History   Problem Relation Age of Onset     C.A.D. Mother         early 60's     Lipids Mother      Heart Disease Mother 68        heart bypass surgery     Hypertension Mother      Thyroid Disease Mother      Heart Disease Father         valvular disease     Thyroid Disease Paternal Aunt      Heart Disease Maternal Grandmother         heart attack       SOCIAL HISTORY:  Social History     Socioeconomic History     Marital status:      Number of children: 2   Occupational History     Employer: [employer]   Tobacco Use     Smoking status: Former Smoker     Years: 5.00     Smokeless tobacco: Never Used     Tobacco comment: quit 20 years ago   Vaping Use     Vaping Use: Never used   Substance and Sexual Activity     Alcohol use: Yes     Comment: wine every week     Drug use: No     Sexual activity: Yes     Birth control/protection: Condom   Other Topics Concern     Exercise Yes     Seat Belt Yes       Review of Systems:  Skin:        Eyes:       ENT:       Respiratory:       Cardiovascular:       Gastroenterology:      Genitourinary:       Musculoskeletal:       Neurologic:       Psychiatric:       Heme/Lymph/Imm:       Endocrine:         Physical Exam:  Vitals: LMP 07/07/2008      GEN:  NAD  NECK: No JVD  C/V:  Regular rate and rhythm; 2/6 ZAYDA at RUSB.  RESP: Clear to auscultation bilaterally without wheezing, rales, or rhonchi.  GI:  Abdomen soft, nontender, nondistended.   EXTREM: No pitting LE edema.   NEURO: Alert and oriented, cooperative. No obvious focal deficits.   PSYCH: Normal affect.  SKIN: Warm and dry.       Recent Lab Results:  LIPID RESULTS:  Lab Results   Component Value Date    CHOL 136 03/15/2022    CHOL 135 01/21/2021    HDL 81 03/15/2022    HDL 75 01/21/2021    LDL 43 03/15/2022    LDL 48 01/21/2021    TRIG 61 03/15/2022    TRIG 59 01/21/2021    CHOLHDLRATIO 3.3 01/31/2011       LIVER ENZYME RESULTS:  Lab Results   Component Value Date    AST 18 05/26/2022    AST 18 03/11/2019    ALT 26 05/26/2022    ALT 34 06/19/2019       CBC RESULTS:  Lab Results   Component Value Date    WBC 7.8 06/01/2022    WBC 6.0 03/11/2019    RBC 3.65 (L) 06/01/2022    RBC 4.43 03/11/2019    HGB 11.6 (L) 06/01/2022    HGB 14.4 03/11/2019    HCT 35.3 06/01/2022    HCT 42.0 03/11/2019    MCV 97 06/01/2022    MCV 95 03/11/2019    MCH 31.8 06/01/2022    MCH 32.5 03/11/2019    MCHC 32.9 06/01/2022    MCHC 34.3 03/11/2019    RDW 12.9 06/01/2022    RDW 12.4 03/11/2019     06/01/2022     03/11/2019       BMP RESULTS:  Lab Results   Component Value Date     06/01/2022     01/21/2021    POTASSIUM 4.4 06/01/2022    POTASSIUM 4.2 01/21/2021    CHLORIDE 108 06/01/2022    CHLORIDE 106 01/21/2021    CO2 27 06/01/2022    CO2 33 (H) 01/21/2021    ANIONGAP 3 06/01/2022    ANIONGAP 1 (L) 01/21/2021    GLC 99 06/01/2022    GLC 95 01/21/2021    BUN 13 06/01/2022    BUN 19 01/21/2021    CR 0.75 06/01/2022    CR 0.82 01/21/2021    GFRESTIMATED 88 06/01/2022    GFRESTIMATED 76 01/21/2021    GFRESTBLACK 88 01/21/2021    RALPH 8.6 06/01/2022    RALPH 9.7 01/21/2021        A1C RESULTS:  Lab Results   Component Value Date    A1C 5.7 (H) 03/11/2019       INR RESULTS:  Lab Results   Component Value Date    INR 1.06 05/26/2022    INR 1.05 05/09/2022    INR 0.96 03/11/2019           Geneva Maurer PA-C  June 1, 2022

## 2022-06-02 ENCOUNTER — PATIENT OUTREACH (OUTPATIENT)
Dept: CARE COORDINATION | Facility: CLINIC | Age: 67
End: 2022-06-02
Payer: COMMERCIAL

## 2022-06-02 DIAGNOSIS — Z71.89 OTHER SPECIFIED COUNSELING: ICD-10-CM

## 2022-06-02 NOTE — PROGRESS NOTES
"Clinic Care Coordination Contact  [facility]: Post-Discharge Note  SITUATION                                                      Admission:    Admission Date: 05/31/22   Reason for Admission: elective  transcather aortic valve replacement  Discharge:   Discharge Date: 06/01/22  Discharge Diagnosis: elective  transcather aortic valve replacement    BACKGROUND                                                      Per hospital discharge summary and inpatient provider notes:    Heather Bauer is a 67 year old female with a history of CAD s/p PCI to LAD 2019, HTN, HLD, severe AS who presents 5/31/2022 for planned TAVR.   Patient reports progressive symptoms of BORJA prompting above procedure. Procedure was performed by Dr. Olivares under MAC. Post procedure the patient has been hypotensive 70-80s systolic. She reports she feels fatigued but otherwise denies pain, dyspnea, light headedness. She received albumin in PACU and has second 250cc bolus currently infusing per Cardiology order.   She did not take any medications prior to her procedure today. Last dose of BB was evening prior to procedure.   Denies sensation changes, visual changes, speech difficulty.     ASSESSMENT      Enrollment  Primary Care Care Coordination Status: Declined    Discharge Assessment  How are you doing now that you are home?: \"I'm doing ok\"  How are your symptoms? (Red Flag symptoms escalate to triage hotline per guidelines): Improved  Do you feel your condition is stable enough to be safe at home until your provider visit?: Yes  Does the patient have their discharge instructions? : Yes  Does the patient have questions regarding their discharge instructions? : No  Were you started on any new medications or were there changes to any of your previous medications? : Yes  Does the patient have all of their medications?: Yes  Do you have questions regarding any of your medications? : No  Do you have all of your needed medical supplies or " equipment (DME)?  (i.e. oxygen tank, CPAP, cane, etc.): Yes  Discharge follow-up appointment scheduled within 14 calendar days? : Yes  Discharge Follow Up Appointment Date: 06/07/22  Discharge Follow Up Appointment Scheduled with?: Specialty Care Provider                  PLAN                                                      Outpatient Plan:  Follow up with Cardiology within 1 week as planned    Future Appointments   Date Time Provider Department Center   6/7/2022  2:00 PM Geneva Maurer PA-C [facility]   6/13/2022  2:30 PM 2, [facility]   7/7/2022  8:45 AM [id number]   7/7/2022 11:00 AM [facility] [place] [facility]   7/7/2022 12:30 PM Geneva Maurer PA-C [facility]         For any urgent concerns, please contact our 24 hour nurse triage line: [phone] ([phone])         Eda Rojsa  Community Health Worker  Connected Care [facility]  [phone]

## 2022-06-03 LAB
ATRIAL RATE - MUSE: 55 BPM
ATRIAL RATE - MUSE: 59 BPM
ATRIAL RATE - MUSE: 71 BPM
DIASTOLIC BLOOD PRESSURE - MUSE: NORMAL MMHG
INTERPRETATION ECG - MUSE: NORMAL
P AXIS - MUSE: 55 DEGREES
P AXIS - MUSE: 60 DEGREES
P AXIS - MUSE: 60 DEGREES
PR INTERVAL - MUSE: 154 MS
PR INTERVAL - MUSE: 166 MS
PR INTERVAL - MUSE: 178 MS
QRS DURATION - MUSE: 74 MS
QRS DURATION - MUSE: 86 MS
QRS DURATION - MUSE: 86 MS
QT - MUSE: 372 MS
QT - MUSE: 444 MS
QT - MUSE: 458 MS
QTC - MUSE: 404 MS
QTC - MUSE: 438 MS
QTC - MUSE: 439 MS
R AXIS - MUSE: 34 DEGREES
R AXIS - MUSE: 47 DEGREES
R AXIS - MUSE: 52 DEGREES
SYSTOLIC BLOOD PRESSURE - MUSE: NORMAL MMHG
T AXIS - MUSE: 65 DEGREES
T AXIS - MUSE: 66 DEGREES
T AXIS - MUSE: 66 DEGREES
VENTRICULAR RATE- MUSE: 55 BPM
VENTRICULAR RATE- MUSE: 59 BPM
VENTRICULAR RATE- MUSE: 71 BPM

## 2022-06-07 ENCOUNTER — LAB (OUTPATIENT)
Dept: LAB | Facility: CLINIC | Age: 67
End: 2022-06-07

## 2022-06-07 ENCOUNTER — OFFICE VISIT (OUTPATIENT)
Dept: CARDIOLOGY | Facility: CLINIC | Age: 67
End: 2022-06-07
Attending: INTERNAL MEDICINE
Payer: COMMERCIAL

## 2022-06-07 VITALS
BODY MASS INDEX: 34.1 KG/M2 | OXYGEN SATURATION: 100 % | WEIGHT: 225 LBS | SYSTOLIC BLOOD PRESSURE: 124 MMHG | DIASTOLIC BLOOD PRESSURE: 79 MMHG | HEART RATE: 65 BPM | HEIGHT: 68 IN

## 2022-06-07 DIAGNOSIS — Z95.2 S/P TAVR (TRANSCATHETER AORTIC VALVE REPLACEMENT): ICD-10-CM

## 2022-06-07 LAB
ANION GAP SERPL CALCULATED.3IONS-SCNC: 4 MMOL/L (ref 3–14)
BUN SERPL-MCNC: 12 MG/DL (ref 7–30)
CALCIUM SERPL-MCNC: 9.3 MG/DL (ref 8.5–10.1)
CHLORIDE BLD-SCNC: 106 MMOL/L (ref 94–109)
CO2 SERPL-SCNC: 29 MMOL/L (ref 20–32)
CREAT SERPL-MCNC: 0.75 MG/DL (ref 0.52–1.04)
ERYTHROCYTE [DISTWIDTH] IN BLOOD BY AUTOMATED COUNT: 13 % (ref 10–15)
GFR SERPL CREATININE-BSD FRML MDRD: 88 ML/MIN/1.73M2
GLUCOSE BLD-MCNC: 77 MG/DL (ref 70–99)
HCT VFR BLD AUTO: 37.1 % (ref 35–47)
HGB BLD-MCNC: 11.8 G/DL (ref 11.7–15.7)
MCH RBC QN AUTO: 32.1 PG (ref 26.5–33)
MCHC RBC AUTO-ENTMCNC: 31.8 G/DL (ref 31.5–36.5)
MCV RBC AUTO: 101 FL (ref 78–100)
PLATELET # BLD AUTO: 224 10E3/UL (ref 150–450)
POTASSIUM BLD-SCNC: 3.9 MMOL/L (ref 3.4–5.3)
RBC # BLD AUTO: 3.68 10E6/UL (ref 3.8–5.2)
SODIUM SERPL-SCNC: 139 MMOL/L (ref 133–144)
WBC # BLD AUTO: 6.9 10E3/UL (ref 4–11)

## 2022-06-07 PROCEDURE — 36415 COLL VENOUS BLD VENIPUNCTURE: CPT | Performed by: PHYSICIAN ASSISTANT

## 2022-06-07 PROCEDURE — 99215 OFFICE O/P EST HI 40 MIN: CPT | Performed by: PHYSICIAN ASSISTANT

## 2022-06-07 PROCEDURE — 85027 COMPLETE CBC AUTOMATED: CPT | Performed by: PHYSICIAN ASSISTANT

## 2022-06-07 PROCEDURE — 80048 BASIC METABOLIC PNL TOTAL CA: CPT | Performed by: PHYSICIAN ASSISTANT

## 2022-06-07 NOTE — LETTER
6/7/2022    Sylvia Bird MD  [address]  [facility] [address]    RE: Heather Mcadamsromel       Dear Colleague,     I had the pleasure of seeing Heather Bauer in the [facility].  Primary Cardiologist: Dr. Dave    Reason for Visit: 1 week post TAVR follow up     History of Present Illness:   Heather is a pleasant 67 year old female with past medical history notable for history of severe symptomatic aortic stenosis (s/p TAVR, received 23 mm ES3 valve with post procedure echo showing normal valve function (mean gradient 11 mmHg with no significant PVL)), CAD (history of PCI to LAD in 2019), hypertension, and hyperlipidemia.    Heather returns to clinic today, stating she feels OK. She has been dealing with SOB. She has no orthopnea, PND, peripheral edema, lightheadedness, syncope, CP, palpitations, or bleeding issues. Her pta lasix 40 mg was stopped post TAVR as she was felt to be euovolemic. She also wonders if anxiety is playing a part in this.     Her post TAVR course otherwise was uneventful. Echocardiogram showed good function of her valve. She was kept on aspirin 81 mg daily for antiplatelet therapy. Her BB was resumed after determining she had no conduction issues post TAVR. Furosemide was discontinued as she appeared euvolemic.       Assessment and Plan:  Heather is a pleasant 67 year old female with past medical history notable for history of severe symptomatic aortic stenosis (s/p TAVR, received 23 mm ES3 valve with post procedure echo showing normal valve function (mean gradient 11 mmHg with no significant PVL)), CAD (history of PCI to LAD in 2019), hypertension, and hyperlipidemia.    On exam she appears euvolemic but her weight is up significantly compared to last clinic visit.  We will trial with low-dose Lasix at this time.  I will have her take 20 mg daily for the next 3 to 4 days.  She will contact her clinic for update.  Her vitals are stable.  She has no acute symptoms  "to warrant earlier evaluation with echocardiogram.  I will have her stop by lab today for BMP and CBC.  She is on baby aspirin which she has taken for quite some time.  She has no new medications since her TAVR.    She will otherwise return to clinic in 3 weeks with repeat labs, ECG, and echocardiogram. She will continue with aspirin. I reminded her of SBE prophylaxis.     45 minutes spent on the date of the encounter with chart review, patient visit, care coordination, and documentation.      This note was completed in part using Dragon voice recognition software. Although reviewed after completion, some word and grammatical errors may occur.    Orders this Visit:  No orders of the defined types were placed in this encounter.    No orders of the defined types were placed in this encounter.    There are no discontinued medications.      No diagnosis found.    CURRENT MEDICATIONS:  Current Outpatient Medications   Medication Sig Dispense Refill     aspirin 81 MG EC tablet Take 81 mg by mouth daily       atorvastatin (LIPITOR) 40 MG tablet Take 1 tablet (40 mg) by mouth daily 90 tablet 1     lisinopril (ZESTRIL) 5 MG tablet Take 1 tablet (5 mg) by mouth daily 30 tablet 11     metoprolol succinate ER (TOPROL XL) 25 MG 24 hr tablet Take 0.5 tablets (12.5 mg) by mouth daily 45 tablet 1     MULTIPLE MINERALS-VITAMINS OR TABS Take 1 tablet by mouth daily       nitroGLYcerin (NITROSTAT) 0.4 MG sublingual tablet One tablet under the tongue every 5 minutes if needed for chest pain. May repeat every 5 minutes for a maximum of 3 doses in 15 minutes\" 25 tablet 3       ALLERGIES     Allergies   Allergen Reactions     No Known Drug Allergies        PAST MEDICAL HISTORY:  Past Medical History:   Diagnosis Date     Aortic valve stenosis      Essential hypertension, benign      Fracture, tibia, shaft      History of colposcopy with cervical biopsy 2/22/11    AMRITA I     Papanicolaou smear of vagina with atypical squamous cells cannot " exclude high grade squamous intraepithelial lesion (ASC-H) 1/31/11       PAST SURGICAL HISTORY:  Past Surgical History:   Procedure Laterality Date     CV CORONARY ANGIOGRAM N/A 3/11/2019    Procedure: Coronary Angiogram;  Surgeon: Adolph Dave MD;  Location:  HEART CARDIAC CATH LAB     CV CORONARY ANGIOGRAM N/A 5/9/2022    Procedure: Coronary Angiogram;  Surgeon: Madonna Garay MD;  Location:  HEART CARDIAC CATH LAB     CV PCI N/A 5/9/2022    Procedure: Percutaneous Coronary Intervention;  Surgeon: Madonna Garay MD;  Location:  HEART CARDIAC CATH LAB     CV PCI ANGIOPLASTY N/A 3/11/2019    Procedure: Percutaneous Coronary Intervention;  Surgeon: Adolph Dave MD;  Location:  HEART CARDIAC CATH LAB     ZZC APPENDECTOMY         FAMILY HISTORY:  Family History   Problem Relation Age of Onset     C.A.D. Mother         early 60's     Lipids Mother      Heart Disease Mother 68        heart bypass surgery     Hypertension Mother      Thyroid Disease Mother      Heart Disease Father         valvular disease     Thyroid Disease Paternal Aunt      Heart Disease Maternal Grandmother         heart attack       SOCIAL HISTORY:  Social History     Socioeconomic History     Marital status:      Number of children: 2   Occupational History     Employer: [employer]   Tobacco Use     Smoking status: Former Smoker     Years: 5.00     Smokeless tobacco: Never Used     Tobacco comment: quit 20 years ago   Vaping Use     Vaping Use: Never used   Substance and Sexual Activity     Alcohol use: Yes     Comment: wine every week     Drug use: No     Sexual activity: Yes     Birth control/protection: Condom   Other Topics Concern     Exercise Yes     Seat Belt Yes       Review of Systems:  Skin:        Eyes:       ENT:       Respiratory:       Cardiovascular:       Gastroenterology:      Genitourinary:       Musculoskeletal:       Neurologic:       Psychiatric:       Heme/Lymph/Imm:       Endocrine:          Physical Exam:  Vitals: LMP 07/07/2008      GEN:  NAD  NECK: No JVD  C/V:  Regular rate and rhythm; 2/6 ZAYDA at RUSB.  RESP: Clear to auscultation bilaterally without wheezing, rales, or rhonchi.  GI: Abdomen soft, nontender, nondistended.   EXTREM: No pitting LE edema.   NEURO: Alert and oriented, cooperative. No obvious focal deficits.   PSYCH: Normal affect.  SKIN: Warm and dry.       Recent Lab Results:  LIPID RESULTS:  Lab Results   Component Value Date    CHOL 136 03/15/2022    CHOL 135 01/21/2021    HDL 81 03/15/2022    HDL 75 01/21/2021    LDL 43 03/15/2022    LDL 48 01/21/2021    TRIG 61 03/15/2022    TRIG 59 01/21/2021    CHOLHDLRATIO 3.3 01/31/2011       LIVER ENZYME RESULTS:  Lab Results   Component Value Date    AST 18 05/26/2022    AST 18 03/11/2019    ALT 26 05/26/2022    ALT 34 06/19/2019       CBC RESULTS:  Lab Results   Component Value Date    WBC 7.8 06/01/2022    WBC 6.0 03/11/2019    RBC 3.65 (L) 06/01/2022    RBC 4.43 03/11/2019    HGB 11.6 (L) 06/01/2022    HGB 14.4 03/11/2019    HCT 35.3 06/01/2022    HCT 42.0 03/11/2019    MCV 97 06/01/2022    MCV 95 03/11/2019    MCH 31.8 06/01/2022    MCH 32.5 03/11/2019    MCHC 32.9 06/01/2022    MCHC 34.3 03/11/2019    RDW 12.9 06/01/2022    RDW 12.4 03/11/2019     06/01/2022     03/11/2019       BMP RESULTS:  Lab Results   Component Value Date     06/01/2022     01/21/2021    POTASSIUM 4.4 06/01/2022    POTASSIUM 4.2 01/21/2021    CHLORIDE 108 06/01/2022    CHLORIDE 106 01/21/2021    CO2 27 06/01/2022    CO2 33 (H) 01/21/2021    ANIONGAP 3 06/01/2022    ANIONGAP 1 (L) 01/21/2021    GLC 99 06/01/2022    GLC 95 01/21/2021    BUN 13 06/01/2022    BUN 19 01/21/2021    CR 0.75 06/01/2022    CR 0.82 01/21/2021    GFRESTIMATED 88 06/01/2022    GFRESTIMATED 76 01/21/2021    GFRESTBLACK 88 01/21/2021    RALPH 8.6 06/01/2022    RALPH 9.7 01/21/2021        A1C RESULTS:  Lab Results   Component Value Date    A1C 5.7 (H) 03/11/2019       INR  RESULTS:  Lab Results   Component Value Date    INR 1.06 05/26/2022    INR 1.05 05/09/2022    INR 0.96 03/11/2019           Geneva Maurer PA-C  June 1, 2022       Thank you for allowing me to participate in the care of your patient.      Sincerely,     Geneva Maurer PA-C     [facility]  cc:   Madonna Garay MD  [address] [id number]

## 2022-06-07 NOTE — PATIENT INSTRUCTIONS
Today's Plan:   1) Try taking low-dose lasix (20 mg) for 3 days and see if this helps with your breathing. Message us on Friday for update.   2) Stop by lab today.     If you have questions or concerns please call my nurse team at [phone].     Scheduling phone number: [phone].  Reminder: Please bring in all current medications, over the counter supplements and vitamin bottles to your next appointment.    It was a pleasure seeing you today!     Geneva Maurer PA-C  6/7/2022

## 2022-06-10 ENCOUNTER — MYC MEDICAL ADVICE (OUTPATIENT)
Dept: CARDIOLOGY | Facility: CLINIC | Age: 67
End: 2022-06-10
Payer: COMMERCIAL

## 2022-06-10 NOTE — TELEPHONE ENCOUNTER
My chart message from patient:  Heather Bauer  PENNY [facility] 2  I took 20 MG of Lasix Tuesday, Wednesday, and Thursday. My shortness of breath mostly subsided, although I had some shortness of breath on Thursday later in the day but not at night.  What do you recommend moving forward?   Thank you,   Heather     Patient was seen 6/7/2022 post-TAVR and give directions for c/o SOB:  Per ELIZABETH Geneva Maurer: On exam she appears euvolemic but her weight is up significantly compared to last clinic visit.  We will trial with low-dose Lasix at this time.  I will have her take 20 mg daily for the next 3 to 4 days    Patient is scheduled for echo, bmp, cbc and OV with ELIZABETH Geneva Maurer on 7/7/2022.    1030 called patient who states her weight did not change since her visit on the 7th, still around 225#. No edema. She did notice an increase in urination, just not as much on the low-dose of lasix 20mg.    Will message ELIZABETH Geneva Maurer to review for next steps      12:08 reply from ELIZABETH Geneva Maurer:  Geneva Maurer PA-C [facility] Team 2  We can continue with it one more week and have her stop it. I am not sure if SOB is related to fluid issue.     Thanks,     Geneva           Called patient to continue lasix 20mg for another 7 days. She will monitor breathing after that and call if any questions or concerns.

## 2022-06-13 ENCOUNTER — HOSPITAL ENCOUNTER (OUTPATIENT)
Dept: CARDIAC REHAB | Facility: CLINIC | Age: 67
Discharge: HOME | End: 2022-06-13
Attending: NURSE PRACTITIONER
Payer: COMMERCIAL

## 2022-06-13 DIAGNOSIS — Z95.2 S/P TAVR (TRANSCATHETER AORTIC VALVE REPLACEMENT): ICD-10-CM

## 2022-06-13 PROCEDURE — 93797 PHYS/QHP OP CAR RHAB WO ECG: CPT | Mod: XU

## 2022-06-13 PROCEDURE — 93798 PHYS/QHP OP CAR RHAB W/ECG: CPT

## 2022-06-15 ENCOUNTER — HOSPITAL ENCOUNTER (OUTPATIENT)
Dept: CARDIAC REHAB | Facility: CLINIC | Age: 67
Discharge: HOME | End: 2022-06-15
Attending: INTERNAL MEDICINE
Payer: COMMERCIAL

## 2022-06-15 PROCEDURE — 93798 PHYS/QHP OP CAR RHAB W/ECG: CPT | Performed by: REHABILITATION PRACTITIONER

## 2022-06-17 ENCOUNTER — HOSPITAL ENCOUNTER (OUTPATIENT)
Dept: CARDIAC REHAB | Facility: CLINIC | Age: 67
Discharge: HOME | End: 2022-06-17
Attending: INTERNAL MEDICINE
Payer: COMMERCIAL

## 2022-06-17 PROCEDURE — 93798 PHYS/QHP OP CAR RHAB W/ECG: CPT

## 2022-06-21 ENCOUNTER — HOSPITAL ENCOUNTER (OUTPATIENT)
Dept: CARDIAC REHAB | Facility: CLINIC | Age: 67
Discharge: HOME | End: 2022-06-21
Attending: INTERNAL MEDICINE
Payer: COMMERCIAL

## 2022-06-21 PROCEDURE — 93798 PHYS/QHP OP CAR RHAB W/ECG: CPT | Performed by: REHABILITATION PRACTITIONER

## 2022-06-22 ENCOUNTER — HOSPITAL ENCOUNTER (OUTPATIENT)
Dept: CARDIAC REHAB | Facility: CLINIC | Age: 67
Discharge: HOME | End: 2022-06-22
Attending: INTERNAL MEDICINE
Payer: COMMERCIAL

## 2022-06-22 PROCEDURE — 93798 PHYS/QHP OP CAR RHAB W/ECG: CPT

## 2022-06-24 ENCOUNTER — HOSPITAL ENCOUNTER (OUTPATIENT)
Dept: CARDIAC REHAB | Facility: CLINIC | Age: 67
Discharge: HOME | End: 2022-06-24
Attending: INTERNAL MEDICINE
Payer: COMMERCIAL

## 2022-06-24 PROCEDURE — 93798 PHYS/QHP OP CAR RHAB W/ECG: CPT | Performed by: REHABILITATION PRACTITIONER

## 2022-06-28 ENCOUNTER — HOSPITAL ENCOUNTER (OUTPATIENT)
Dept: CARDIAC REHAB | Facility: CLINIC | Age: 67
Discharge: HOME | End: 2022-06-28
Attending: INTERNAL MEDICINE
Payer: COMMERCIAL

## 2022-06-28 PROCEDURE — 93798 PHYS/QHP OP CAR RHAB W/ECG: CPT | Performed by: REHABILITATION PRACTITIONER

## 2022-06-30 ENCOUNTER — HOSPITAL ENCOUNTER (OUTPATIENT)
Dept: CARDIAC REHAB | Facility: CLINIC | Age: 67
Discharge: HOME | End: 2022-06-30
Attending: INTERNAL MEDICINE
Payer: COMMERCIAL

## 2022-06-30 PROCEDURE — 93798 PHYS/QHP OP CAR RHAB W/ECG: CPT | Performed by: REHABILITATION PRACTITIONER

## 2022-07-01 ENCOUNTER — HOSPITAL ENCOUNTER (OUTPATIENT)
Dept: CARDIAC REHAB | Facility: CLINIC | Age: 67
Discharge: HOME | End: 2022-07-01
Attending: INTERNAL MEDICINE
Payer: COMMERCIAL

## 2022-07-01 PROCEDURE — 93798 PHYS/QHP OP CAR RHAB W/ECG: CPT | Performed by: REHABILITATION PRACTITIONER

## 2022-07-06 ENCOUNTER — HOSPITAL ENCOUNTER (OUTPATIENT)
Dept: CARDIAC REHAB | Facility: CLINIC | Age: 67
Discharge: HOME | End: 2022-07-06
Attending: INTERNAL MEDICINE
Payer: COMMERCIAL

## 2022-07-06 PROCEDURE — 93798 PHYS/QHP OP CAR RHAB W/ECG: CPT

## 2022-07-07 ENCOUNTER — OFFICE VISIT (OUTPATIENT)
Dept: CARDIOLOGY | Facility: CLINIC | Age: 67
End: 2022-07-07
Attending: INTERNAL MEDICINE
Payer: COMMERCIAL

## 2022-07-07 ENCOUNTER — HOSPITAL ENCOUNTER (OUTPATIENT)
Dept: CARDIOLOGY | Facility: CLINIC | Age: 67
Discharge: HOME | End: 2022-07-07
Attending: INTERNAL MEDICINE | Admitting: INTERNAL MEDICINE
Payer: COMMERCIAL

## 2022-07-07 ENCOUNTER — LAB (OUTPATIENT)
Dept: LAB | Facility: CLINIC | Age: 67
End: 2022-07-07
Attending: INTERNAL MEDICINE
Payer: COMMERCIAL

## 2022-07-07 VITALS
SYSTOLIC BLOOD PRESSURE: 127 MMHG | BODY MASS INDEX: 33.65 KG/M2 | HEART RATE: 65 BPM | HEIGHT: 68 IN | OXYGEN SATURATION: 99 % | WEIGHT: 222 LBS | DIASTOLIC BLOOD PRESSURE: 80 MMHG

## 2022-07-07 DIAGNOSIS — Z95.2 S/P TAVR (TRANSCATHETER AORTIC VALVE REPLACEMENT): Primary | ICD-10-CM

## 2022-07-07 DIAGNOSIS — I25.10 CORONARY ARTERY DISEASE INVOLVING NATIVE CORONARY ARTERY OF NATIVE HEART WITHOUT ANGINA PECTORIS: ICD-10-CM

## 2022-07-07 DIAGNOSIS — Z95.2 S/P TAVR (TRANSCATHETER AORTIC VALVE REPLACEMENT): ICD-10-CM

## 2022-07-07 DIAGNOSIS — I10 ESSENTIAL HYPERTENSION, BENIGN: ICD-10-CM

## 2022-07-07 DIAGNOSIS — E78.00 HYPERCHOLESTEREMIA: ICD-10-CM

## 2022-07-07 LAB
ANION GAP SERPL CALCULATED.3IONS-SCNC: 2 MMOL/L (ref 3–14)
BUN SERPL-MCNC: 19 MG/DL (ref 7–30)
CALCIUM SERPL-MCNC: 9.5 MG/DL (ref 8.5–10.1)
CHLORIDE BLD-SCNC: 107 MMOL/L (ref 94–109)
CO2 SERPL-SCNC: 29 MMOL/L (ref 20–32)
CREAT SERPL-MCNC: 0.72 MG/DL (ref 0.52–1.04)
ERYTHROCYTE [DISTWIDTH] IN BLOOD BY AUTOMATED COUNT: 13.3 % (ref 10–15)
GFR SERPL CREATININE-BSD FRML MDRD: >90 ML/MIN/1.73M2
GLUCOSE BLD-MCNC: 90 MG/DL (ref 70–99)
HCT VFR BLD AUTO: 39.9 % (ref 35–47)
HGB BLD-MCNC: 13.1 G/DL (ref 11.7–15.7)
MCH RBC QN AUTO: 32.3 PG (ref 26.5–33)
MCHC RBC AUTO-ENTMCNC: 32.8 G/DL (ref 31.5–36.5)
MCV RBC AUTO: 98 FL (ref 78–100)
PLATELET # BLD AUTO: 232 10E3/UL (ref 150–450)
POTASSIUM BLD-SCNC: 4.3 MMOL/L (ref 3.4–5.3)
RBC # BLD AUTO: 4.06 10E6/UL (ref 3.8–5.2)
SODIUM SERPL-SCNC: 138 MMOL/L (ref 133–144)
WBC # BLD AUTO: 5.3 10E3/UL (ref 4–11)

## 2022-07-07 PROCEDURE — 99214 OFFICE O/P EST MOD 30 MIN: CPT | Mod: 25 | Performed by: PHYSICIAN ASSISTANT

## 2022-07-07 PROCEDURE — 85027 COMPLETE CBC AUTOMATED: CPT | Performed by: INTERNAL MEDICINE

## 2022-07-07 PROCEDURE — 999N000208 ECHOCARDIOGRAM COMPLETE

## 2022-07-07 PROCEDURE — 93306 TTE W/DOPPLER COMPLETE: CPT | Mod: 26 | Performed by: INTERNAL MEDICINE

## 2022-07-07 PROCEDURE — 255N000002 HC RX 255 OP 636: Performed by: INTERNAL MEDICINE

## 2022-07-07 PROCEDURE — 36415 COLL VENOUS BLD VENIPUNCTURE: CPT | Performed by: INTERNAL MEDICINE

## 2022-07-07 PROCEDURE — 80048 BASIC METABOLIC PNL TOTAL CA: CPT | Performed by: INTERNAL MEDICINE

## 2022-07-07 PROCEDURE — 93000 ELECTROCARDIOGRAM COMPLETE: CPT | Performed by: PHYSICIAN ASSISTANT

## 2022-07-07 RX ADMIN — HUMAN ALBUMIN MICROSPHERES AND PERFLUTREN 9 ML: 10; .22 INJECTION, SOLUTION INTRAVENOUS at 09:40

## 2022-07-07 NOTE — LETTER
7/7/2022    Sylvia Bird MD  [address]  [facility] [address]    RE: Heather Mcadamsromel       Dear Colleague,     I had the pleasure of seeing Heather Bauer in the [facility].  Primary Cardiologist: Dr. Dave    Reason for Visit: 1 month post TAVR follow up     History of Present Illness:   Heather is a pleasant 67 year old female with past medical history notable for history of severe symptomatic aortic stenosis (s/p TAVR, received 23 mm ES3 valve with post procedure echo showing normal valve function (mean gradient 11 mmHg with no significant PVL)), CAD (history of PCI to LAD in 2019), hypertension, and hyperlipidemia.    Heather presents to clinic with her spouse stating she is doing much better.  With a short course of furosemide her shortness of breath is resolved.  She denies chest discomfort, palpitations, PND, orthopnea, peripheral edema, or bleeding issues.  She has been continuing with cardiac rehab and this is going quite well.  Overall she is pleased with her recovery following her elective TAVR procedure.    Assessment and Plan:  Heather is a pleasant 67 year old female with past medical history notable for history of severe symptomatic aortic stenosis (s/p TAVR, received 23 mm ES3 valve with post procedure echo showing normal valve function (mean gradient 11 mmHg with no significant PVL)), CAD (history of PCI to LAD in 2019), hypertension, and hyperlipidemia.    Heather appears to be doing well from a cardiac standpoint.  Her blood pressure and pulse are within normal limits.  She continues to be on baby aspirin with no bleeding issues.  We did give her a low-dose furosemide for about a week due to reports of shortness of breath following her procedure and this has resolved her shortness of breath.  I suspect she had mild fluid overload following the procedure as her LVEDP was borderline elevated.  Her ECG today shows normal sinus rhythm with no widening QRS complexes or  any evidence of high degree AV block.  Her recent echocardiogram shows preserved biventricular function with her new bioprosthetic valve well-seated.  There appears to be no significant aortic regurgitation.  Her mean valve gradient is within normal limits.  Both BMP and CBC appear to be unremarkable.  At this time we will continue with current medications with no additional changes.  I have recommended that she returns to clinic in about 11 months at which point we will repeat echocardiogram, labs, and ECG.  She will contact her clinic in the interim with additional questions or concerns.  I also reminded her in regards to the need for SBE prophylaxis prior to any type of dental work including cleaning.  She verbalized understanding.    Overall her clinical picture is consistent with NYHA class I.    This note was completed in part using Dragon voice recognition software. Although reviewed after completion, some word and grammatical errors may occur.    Orders this Visit:  No orders of the defined types were placed in this encounter.    No orders of the defined types were placed in this encounter.    There are no discontinued medications.      Encounter Diagnosis   Name Primary?     S/P TAVR (transcatheter aortic valve replacement)        CURRENT MEDICATIONS:  Current Outpatient Medications   Medication Sig Dispense Refill     aspirin 81 MG EC tablet Take 81 mg by mouth daily       atorvastatin (LIPITOR) 40 MG tablet Take 1 tablet (40 mg) by mouth daily 90 tablet 1     lisinopril (ZESTRIL) 5 MG tablet Take 1 tablet (5 mg) by mouth daily 30 tablet 11     metoprolol succinate ER (TOPROL XL) 25 MG 24 hr tablet Take 0.5 tablets (12.5 mg) by mouth daily 45 tablet 1     MULTIPLE MINERALS-VITAMINS OR TABS Take 1 tablet by mouth daily       nitroGLYcerin (NITROSTAT) 0.4 MG sublingual tablet One tablet under the tongue every 5 minutes if needed for chest pain. May repeat every 5 minutes for a maximum of 3 doses in 15  "minutes\" 25 tablet 3       ALLERGIES     Allergies   Allergen Reactions     No Known Drug Allergies        PAST MEDICAL HISTORY:  Past Medical History:   Diagnosis Date     Aortic valve stenosis      Essential hypertension, benign      Fracture, tibia, shaft      History of colposcopy with cervical biopsy 2/22/11    AMRITA I     Papanicolaou smear of vagina with atypical squamous cells cannot exclude high grade squamous intraepithelial lesion (ASC-H) 1/31/11       PAST SURGICAL HISTORY:  Past Surgical History:   Procedure Laterality Date     CV CORONARY ANGIOGRAM N/A 3/11/2019    Procedure: Coronary Angiogram;  Surgeon: Adolph Dave MD;  Location: [facility] CARDIAC CATH LAB     CV CORONARY ANGIOGRAM N/A 5/9/2022    Procedure: Coronary Angiogram;  Surgeon: Madonna Garay MD;  Location: [facility] CARDIAC CATH LAB     CV PCI N/A 5/9/2022    Procedure: Percutaneous Coronary Intervention;  Surgeon: Madonna Garay MD;  Location: [facility] CARDIAC CATH LAB     CV PCI ANGIOPLASTY N/A 3/11/2019    Procedure: Percutaneous Coronary Intervention;  Surgeon: Adolph Dave MD;  Location: [facility] CARDIAC CATH LAB     CV TRANSCATHETER AORTIC VALVE REPLACEMENT-FEMORAL APPROACH N/A 5/31/2022    Procedure: Transcatheter Aortic Valve Replacement-Femoral Approach;  Surgeon: Madonna Garay MD;  Location: [facility] CARDIAC CATH LAB     ZZC APPENDECTOMY         FAMILY HISTORY:  Family History   Problem Relation Age of Onset     C.A.D. Mother         early 60's     Lipids Mother      Heart Disease Mother 68        heart bypass surgery     Hypertension Mother      Thyroid Disease Mother      Heart Disease Father         valvular disease     Thyroid Disease Paternal Aunt      Heart Disease Maternal Grandmother         heart attack       SOCIAL HISTORY:  Social History     Socioeconomic History     Marital status:      Spouse name: None     Number of children: 2     Years of education: None     Highest education level: " "None   Occupational History     Employer: [employer]   Tobacco Use     Smoking status: Former Smoker     Years: 5.00     Smokeless tobacco: Never Used     Tobacco comment: quit 20 years ago   Vaping Use     Vaping Use: Never used   Substance and Sexual Activity     Alcohol use: Yes     Comment: wine every week     Drug use: No     Sexual activity: Yes     Birth control/protection: Condom   Other Topics Concern     Exercise Yes     Seat Belt Yes       Review of Systems:  Skin:  Negative lumps or bumps   Eyes:  Negative glasses  ENT:  Negative    Respiratory:  Negative    Cardiovascular:  Negative    Gastroenterology: Negative    Genitourinary:  not assessed urinary frequency  Musculoskeletal:  Negative joint pain  Neurologic:  Positive for numbness or tingling of hands  Psychiatric:  Positive for anxiety  Heme/Lymph/Imm:  Negative    Endocrine:  Negative      Physical Exam:  Vitals: /80   Pulse 65   Ht 1.727 m (5' 8\")   Wt 100.7 kg (222 lb)   LMP 07/07/2008   SpO2 99%   BMI 33.75 kg/m       GEN:  NAD  NECK: No JVD  C/V:  Regular rate and rhythm, no murmur, rub or gallop.  RESP: Clear to auscultation bilaterally without wheezing, rales, or rhonchi.  GI: Abdomen soft, nontender, nondistended.   EXTREM: No pitting LE edema.   NEURO: Alert and oriented, cooperative. No obvious focal deficits.   PSYCH: Normal affect.  SKIN: Warm and dry.       Recent Lab Results:  LIPID RESULTS:  Lab Results   Component Value Date    CHOL 136 03/15/2022    CHOL 135 01/21/2021    HDL 81 03/15/2022    HDL 75 01/21/2021    LDL 43 03/15/2022    LDL 48 01/21/2021    TRIG 61 03/15/2022    TRIG 59 01/21/2021    CHOLHDLRATIO 3.3 01/31/2011       LIVER ENZYME RESULTS:  Lab Results   Component Value Date    AST 18 05/26/2022    AST 18 03/11/2019    ALT 26 05/26/2022    ALT 34 06/19/2019       CBC RESULTS:  Lab Results   Component Value Date    WBC 5.3 07/07/2022    WBC 6.0 03/11/2019    RBC 4.06 07/07/2022    RBC 4.43 03/11/2019    HGB " 13.1 07/07/2022    HGB 14.4 03/11/2019    HCT 39.9 07/07/2022    HCT 42.0 03/11/2019    MCV 98 07/07/2022    MCV 95 03/11/2019    MCH 32.3 07/07/2022    MCH 32.5 03/11/2019    MCHC 32.8 07/07/2022    MCHC 34.3 03/11/2019    RDW 13.3 07/07/2022    RDW 12.4 03/11/2019     07/07/2022     03/11/2019       BMP RESULTS:  Lab Results   Component Value Date     07/07/2022     01/21/2021    POTASSIUM 4.3 07/07/2022    POTASSIUM 4.2 01/21/2021    CHLORIDE 107 07/07/2022    CHLORIDE 106 01/21/2021    CO2 29 07/07/2022    CO2 33 (H) 01/21/2021    ANIONGAP 2 (L) 07/07/2022    ANIONGAP 1 (L) 01/21/2021    GLC 90 07/07/2022    GLC 95 01/21/2021    BUN 19 07/07/2022    BUN 19 01/21/2021    CR 0.72 07/07/2022    CR 0.82 01/21/2021    GFRESTIMATED >90 07/07/2022    GFRESTIMATED 76 01/21/2021    GFRESTBLACK 88 01/21/2021    RALPH 9.5 07/07/2022    RALPH 9.7 01/21/2021        A1C RESULTS:  Lab Results   Component Value Date    A1C 5.7 (H) 03/11/2019       INR RESULTS:  Lab Results   Component Value Date    INR 1.06 05/26/2022    INR 1.05 05/09/2022    INR 0.96 03/11/2019           Geneva Maurer PA-C  July 7, 2022       KCCQ Results:   1a. 5  1b. 5  1c. 6  2. 5  3. 6  4. 7  5. 5  6. 5  7. 5  8a. 5  8b. 5  8c. 5      Joan Sanabria, RN  Structural Heart Coordinator  [facility]      Thank you for allowing me to participate in the care of your patient.      Sincerely,     Geneva Maurer PA-C     [facility]  cc:   Madonna Garay MD  [address] [id number]

## 2022-07-07 NOTE — PROGRESS NOTES
KCCQ Results:   1a. 5  1b. 5  1c. 6  2. 5  3. 6  4. 7  5. 5  6. 5  7. 5  8a. 5  8b. 5  8c. 5      Joan Sanabria RN  Structural Heart Coordinator  [facility]

## 2022-07-07 NOTE — PROGRESS NOTES
Primary Cardiologist: Dr. Dave    Reason for Visit: 1 month post TAVR follow up     History of Present Illness:   Heather is a pleasant 67 year old female with past medical history notable for history of severe symptomatic aortic stenosis (s/p TAVR, received 23 mm ES3 valve with post procedure echo showing normal valve function (mean gradient 11 mmHg with no significant PVL)), CAD (history of PCI to LAD in 2019), hypertension, and hyperlipidemia.    Heather presents to clinic with her spouse stating she is doing much better.  With a short course of furosemide her shortness of breath is resolved.  She denies chest discomfort, palpitations, PND, orthopnea, peripheral edema, or bleeding issues.  She has been continuing with cardiac rehab and this is going quite well.  Overall she is pleased with her recovery following her elective TAVR procedure.    Assessment and Plan:  Heather is a pleasant 67 year old female with past medical history notable for history of severe symptomatic aortic stenosis (s/p TAVR, received 23 mm ES3 valve with post procedure echo showing normal valve function (mean gradient 11 mmHg with no significant PVL)), CAD (history of PCI to LAD in 2019), hypertension, and hyperlipidemia.    Heather appears to be doing well from a cardiac standpoint.  Her blood pressure and pulse are within normal limits.  She continues to be on baby aspirin with no bleeding issues.  We did give her a low-dose furosemide for about a week due to reports of shortness of breath following her procedure and this has resolved her shortness of breath.  I suspect she had mild fluid overload following the procedure as her LVEDP was borderline elevated.  Her ECG today shows normal sinus rhythm with no widening QRS complexes or any evidence of high degree AV block.  Her recent echocardiogram shows preserved biventricular function with her new bioprosthetic valve well-seated.  There appears to be no significant aortic regurgitation.   "Her mean valve gradient is within normal limits.  Both BMP and CBC appear to be unremarkable.  At this time we will continue with current medications with no additional changes.  I have recommended that she returns to clinic in about 11 months at which point we will repeat echocardiogram, labs, and ECG.  She will contact her clinic in the interim with additional questions or concerns.  I also reminded her in regards to the need for SBE prophylaxis prior to any type of dental work including cleaning.  She verbalized understanding.    Overall her clinical picture is consistent with NYHA class I.    This note was completed in part using Dragon voice recognition software. Although reviewed after completion, some word and grammatical errors may occur.    Orders this Visit:  No orders of the defined types were placed in this encounter.    No orders of the defined types were placed in this encounter.    There are no discontinued medications.      Encounter Diagnosis   Name Primary?     S/P TAVR (transcatheter aortic valve replacement)        CURRENT MEDICATIONS:  Current Outpatient Medications   Medication Sig Dispense Refill     aspirin 81 MG EC tablet Take 81 mg by mouth daily       atorvastatin (LIPITOR) 40 MG tablet Take 1 tablet (40 mg) by mouth daily 90 tablet 1     lisinopril (ZESTRIL) 5 MG tablet Take 1 tablet (5 mg) by mouth daily 30 tablet 11     metoprolol succinate ER (TOPROL XL) 25 MG 24 hr tablet Take 0.5 tablets (12.5 mg) by mouth daily 45 tablet 1     MULTIPLE MINERALS-VITAMINS OR TABS Take 1 tablet by mouth daily       nitroGLYcerin (NITROSTAT) 0.4 MG sublingual tablet One tablet under the tongue every 5 minutes if needed for chest pain. May repeat every 5 minutes for a maximum of 3 doses in 15 minutes\" 25 tablet 3       ALLERGIES     Allergies   Allergen Reactions     No Known Drug Allergies        PAST MEDICAL HISTORY:  Past Medical History:   Diagnosis Date     Aortic valve stenosis      Essential " hypertension, benign      Fracture, tibia, shaft      History of colposcopy with cervical biopsy 2/22/11    AMRITA I     Papanicolaou smear of vagina with atypical squamous cells cannot exclude high grade squamous intraepithelial lesion (ASC-H) 1/31/11       PAST SURGICAL HISTORY:  Past Surgical History:   Procedure Laterality Date     CV CORONARY ANGIOGRAM N/A 3/11/2019    Procedure: Coronary Angiogram;  Surgeon: Adolph Dave MD;  Location: [facility] CARDIAC CATH LAB     CV CORONARY ANGIOGRAM N/A 5/9/2022    Procedure: Coronary Angiogram;  Surgeon: Madnona Garay MD;  Location: [facility] CARDIAC CATH LAB     CV PCI N/A 5/9/2022    Procedure: Percutaneous Coronary Intervention;  Surgeon: Madonna Garay MD;  Location: [facility] CARDIAC CATH LAB     CV PCI ANGIOPLASTY N/A 3/11/2019    Procedure: Percutaneous Coronary Intervention;  Surgeon: Adolph Dave MD;  Location: [facility] CARDIAC CATH LAB     CV TRANSCATHETER AORTIC VALVE REPLACEMENT-FEMORAL APPROACH N/A 5/31/2022    Procedure: Transcatheter Aortic Valve Replacement-Femoral Approach;  Surgeon: Madonna Garay MD;  Location: [facility] CARDIAC CATH LAB     ZZC APPENDECTOMY         FAMILY HISTORY:  Family History   Problem Relation Age of Onset     C.A.D. Mother         early 60's     Lipids Mother      Heart Disease Mother 68        heart bypass surgery     Hypertension Mother      Thyroid Disease Mother      Heart Disease Father         valvular disease     Thyroid Disease Paternal Aunt      Heart Disease Maternal Grandmother         heart attack       SOCIAL HISTORY:  Social History     Socioeconomic History     Marital status:      Spouse name: None     Number of children: 2     Years of education: None     Highest education level: None   Occupational History     Employer: [employer]   Tobacco Use     Smoking status: Former Smoker     Years: 5.00     Smokeless tobacco: Never Used     Tobacco comment: quit 20 years ago   Vaping Use      "Vaping Use: Never used   Substance and Sexual Activity     Alcohol use: Yes     Comment: wine every week     Drug use: No     Sexual activity: Yes     Birth control/protection: Condom   Other Topics Concern     Exercise Yes     Seat Belt Yes       Review of Systems:  Skin:  Negative lumps or bumps   Eyes:  Negative glasses  ENT:  Negative    Respiratory:  Negative    Cardiovascular:  Negative    Gastroenterology: Negative    Genitourinary:  not assessed urinary frequency  Musculoskeletal:  Negative joint pain  Neurologic:  Positive for numbness or tingling of hands  Psychiatric:  Positive for anxiety  Heme/Lymph/Imm:  Negative    Endocrine:  Negative      Physical Exam:  Vitals: /80   Pulse 65   Ht 1.727 m (5' 8\")   Wt 100.7 kg (222 lb)   LMP 07/07/2008   SpO2 99%   BMI 33.75 kg/m       GEN:  NAD  NECK: No JVD  C/V:  Regular rate and rhythm, no murmur, rub or gallop.  RESP: Clear to auscultation bilaterally without wheezing, rales, or rhonchi.  GI: Abdomen soft, nontender, nondistended.   EXTREM: No pitting LE edema.   NEURO: Alert and oriented, cooperative. No obvious focal deficits.   PSYCH: Normal affect.  SKIN: Warm and dry.       Recent Lab Results:  LIPID RESULTS:  Lab Results   Component Value Date    CHOL 136 03/15/2022    CHOL 135 01/21/2021    HDL 81 03/15/2022    HDL 75 01/21/2021    LDL 43 03/15/2022    LDL 48 01/21/2021    TRIG 61 03/15/2022    TRIG 59 01/21/2021    CHOLHDLRATIO 3.3 01/31/2011       LIVER ENZYME RESULTS:  Lab Results   Component Value Date    AST 18 05/26/2022    AST 18 03/11/2019    ALT 26 05/26/2022    ALT 34 06/19/2019       CBC RESULTS:  Lab Results   Component Value Date    WBC 5.3 07/07/2022    WBC 6.0 03/11/2019    RBC 4.06 07/07/2022    RBC 4.43 03/11/2019    HGB 13.1 07/07/2022    HGB 14.4 03/11/2019    HCT 39.9 07/07/2022    HCT 42.0 03/11/2019    MCV 98 07/07/2022    MCV 95 03/11/2019    MCH 32.3 07/07/2022    MCH 32.5 03/11/2019    MCHC 32.8 07/07/2022    [facility] " 34.3 03/11/2019    RDW 13.3 07/07/2022    RDW 12.4 03/11/2019     07/07/2022     03/11/2019       BMP RESULTS:  Lab Results   Component Value Date     07/07/2022     01/21/2021    POTASSIUM 4.3 07/07/2022    POTASSIUM 4.2 01/21/2021    CHLORIDE 107 07/07/2022    CHLORIDE 106 01/21/2021    CO2 29 07/07/2022    CO2 33 (H) 01/21/2021    ANIONGAP 2 (L) 07/07/2022    ANIONGAP 1 (L) 01/21/2021    GLC 90 07/07/2022    GLC 95 01/21/2021    BUN 19 07/07/2022    BUN 19 01/21/2021    CR 0.72 07/07/2022    CR 0.82 01/21/2021    GFRESTIMATED >90 07/07/2022    GFRESTIMATED 76 01/21/2021    GFRESTBLACK 88 01/21/2021    RALPH 9.5 07/07/2022    RALPH 9.7 01/21/2021        A1C RESULTS:  Lab Results   Component Value Date    A1C 5.7 (H) 03/11/2019       INR RESULTS:  Lab Results   Component Value Date    INR 1.06 05/26/2022    INR 1.05 05/09/2022    INR 0.96 03/11/2019           Geneva Maurer PA-C  July 7, 2022

## 2022-07-08 ENCOUNTER — HOSPITAL ENCOUNTER (OUTPATIENT)
Dept: CARDIAC REHAB | Facility: CLINIC | Age: 67
Discharge: HOME | End: 2022-07-08
Attending: INTERNAL MEDICINE
Payer: COMMERCIAL

## 2022-07-08 PROCEDURE — 93798 PHYS/QHP OP CAR RHAB W/ECG: CPT

## 2022-07-11 ENCOUNTER — HOSPITAL ENCOUNTER (OUTPATIENT)
Dept: CARDIAC REHAB | Facility: CLINIC | Age: 67
Discharge: HOME | End: 2022-07-11
Attending: INTERNAL MEDICINE
Payer: COMMERCIAL

## 2022-07-11 PROCEDURE — 93798 PHYS/QHP OP CAR RHAB W/ECG: CPT

## 2022-07-13 ENCOUNTER — HOSPITAL ENCOUNTER (OUTPATIENT)
Dept: CARDIAC REHAB | Facility: CLINIC | Age: 67
Discharge: HOME | End: 2022-07-13
Attending: INTERNAL MEDICINE
Payer: COMMERCIAL

## 2022-07-13 PROCEDURE — 93798 PHYS/QHP OP CAR RHAB W/ECG: CPT | Performed by: REHABILITATION PRACTITIONER

## 2022-07-15 ENCOUNTER — HOSPITAL ENCOUNTER (OUTPATIENT)
Dept: CARDIAC REHAB | Facility: CLINIC | Age: 67
Discharge: HOME | End: 2022-07-15
Attending: INTERNAL MEDICINE
Payer: COMMERCIAL

## 2022-07-15 PROCEDURE — 93798 PHYS/QHP OP CAR RHAB W/ECG: CPT

## 2022-07-18 ENCOUNTER — HOSPITAL ENCOUNTER (OUTPATIENT)
Dept: CARDIAC REHAB | Facility: CLINIC | Age: 67
Discharge: HOME | End: 2022-07-18
Attending: INTERNAL MEDICINE
Payer: COMMERCIAL

## 2022-07-18 PROCEDURE — 93797 PHYS/QHP OP CAR RHAB WO ECG: CPT

## 2022-07-18 PROCEDURE — 93798 PHYS/QHP OP CAR RHAB W/ECG: CPT

## 2022-07-20 ENCOUNTER — HOSPITAL ENCOUNTER (OUTPATIENT)
Dept: CARDIAC REHAB | Facility: CLINIC | Age: 67
Discharge: HOME | End: 2022-07-20
Attending: INTERNAL MEDICINE
Payer: COMMERCIAL

## 2022-07-20 PROCEDURE — 93798 PHYS/QHP OP CAR RHAB W/ECG: CPT

## 2022-07-22 ENCOUNTER — HOSPITAL ENCOUNTER (OUTPATIENT)
Dept: CARDIAC REHAB | Facility: CLINIC | Age: 67
Discharge: HOME | End: 2022-07-22
Attending: INTERNAL MEDICINE
Payer: COMMERCIAL

## 2022-07-22 PROCEDURE — 93798 PHYS/QHP OP CAR RHAB W/ECG: CPT | Performed by: REHABILITATION PRACTITIONER

## 2022-07-27 ENCOUNTER — HOSPITAL ENCOUNTER (OUTPATIENT)
Dept: CARDIAC REHAB | Facility: CLINIC | Age: 67
Discharge: HOME | End: 2022-07-27
Attending: INTERNAL MEDICINE
Payer: COMMERCIAL

## 2022-07-27 PROCEDURE — 93798 PHYS/QHP OP CAR RHAB W/ECG: CPT

## 2022-07-29 ENCOUNTER — HOSPITAL ENCOUNTER (OUTPATIENT)
Dept: CARDIAC REHAB | Facility: CLINIC | Age: 67
Discharge: HOME | End: 2022-07-29
Attending: INTERNAL MEDICINE
Payer: COMMERCIAL

## 2022-07-29 PROCEDURE — 93798 PHYS/QHP OP CAR RHAB W/ECG: CPT

## 2022-08-01 ENCOUNTER — HOSPITAL ENCOUNTER (OUTPATIENT)
Dept: CARDIAC REHAB | Facility: CLINIC | Age: 67
Discharge: HOME | End: 2022-08-01
Attending: INTERNAL MEDICINE
Payer: COMMERCIAL

## 2022-08-01 PROCEDURE — 93798 PHYS/QHP OP CAR RHAB W/ECG: CPT

## 2022-08-03 ENCOUNTER — HOSPITAL ENCOUNTER (OUTPATIENT)
Dept: CARDIAC REHAB | Facility: CLINIC | Age: 67
Discharge: HOME | End: 2022-08-03
Attending: INTERNAL MEDICINE
Payer: COMMERCIAL

## 2022-08-03 PROCEDURE — 93798 PHYS/QHP OP CAR RHAB W/ECG: CPT | Performed by: REHABILITATION PRACTITIONER

## 2022-08-05 ENCOUNTER — HOSPITAL ENCOUNTER (OUTPATIENT)
Dept: CARDIAC REHAB | Facility: CLINIC | Age: 67
Discharge: HOME | End: 2022-08-05
Attending: INTERNAL MEDICINE
Payer: COMMERCIAL

## 2022-08-05 PROCEDURE — 93798 PHYS/QHP OP CAR RHAB W/ECG: CPT

## 2022-08-08 ENCOUNTER — HOSPITAL ENCOUNTER (OUTPATIENT)
Dept: CARDIAC REHAB | Facility: CLINIC | Age: 67
Discharge: HOME | End: 2022-08-08
Attending: INTERNAL MEDICINE
Payer: COMMERCIAL

## 2022-08-08 PROCEDURE — 93798 PHYS/QHP OP CAR RHAB W/ECG: CPT

## 2022-08-10 ENCOUNTER — TELEPHONE (OUTPATIENT)
Dept: CARDIOLOGY | Facility: CLINIC | Age: 67
End: 2022-08-10

## 2022-08-10 ENCOUNTER — HOSPITAL ENCOUNTER (OUTPATIENT)
Dept: CARDIAC REHAB | Facility: CLINIC | Age: 67
Discharge: HOME | End: 2022-08-10
Attending: INTERNAL MEDICINE
Payer: COMMERCIAL

## 2022-08-10 PROCEDURE — 93798 PHYS/QHP OP CAR RHAB W/ECG: CPT

## 2022-08-10 NOTE — TELEPHONE ENCOUNTER
Per chart review, pt is not due to follow up again until 6/2023 with echo and labs prior. Spoke to patient and reviewed this information. She was agreeable to plan, does not have have any concerns at this time. Old/duplicate orders removed from patient's chart.

## 2022-08-10 NOTE — TELEPHONE ENCOUNTER
[facility] Call Center    Phone Message    May a detailed message be left on voicemail: yes     Reason for Call: Other: Pt would like a call back to discuss her appt and echo as the next available appt is January and if thats ok to wait that long she would need the echo orders updated and if that is not ok to see if she can be fit into a slot , Please reach out to pt to discuss     Action Taken: Message routed to:  [facility] Cardio    Travel Screening: Not Applicable

## 2022-08-12 ENCOUNTER — HOSPITAL ENCOUNTER (OUTPATIENT)
Dept: CARDIAC REHAB | Facility: CLINIC | Age: 67
Discharge: HOME | End: 2022-08-12
Attending: INTERNAL MEDICINE
Payer: COMMERCIAL

## 2022-08-12 PROCEDURE — 93798 PHYS/QHP OP CAR RHAB W/ECG: CPT

## 2022-08-15 ENCOUNTER — HOSPITAL ENCOUNTER (OUTPATIENT)
Dept: CARDIAC REHAB | Facility: CLINIC | Age: 67
Discharge: HOME | End: 2022-08-15
Attending: INTERNAL MEDICINE
Payer: COMMERCIAL

## 2022-08-15 PROCEDURE — 93798 PHYS/QHP OP CAR RHAB W/ECG: CPT | Performed by: OCCUPATIONAL THERAPIST

## 2022-08-17 ENCOUNTER — HOSPITAL ENCOUNTER (OUTPATIENT)
Dept: CARDIAC REHAB | Facility: CLINIC | Age: 67
Discharge: HOME | End: 2022-08-17
Attending: INTERNAL MEDICINE
Payer: COMMERCIAL

## 2022-08-17 PROCEDURE — 93798 PHYS/QHP OP CAR RHAB W/ECG: CPT

## 2022-08-19 ENCOUNTER — HOSPITAL ENCOUNTER (OUTPATIENT)
Dept: CARDIAC REHAB | Facility: CLINIC | Age: 67
Discharge: HOME | End: 2022-08-19
Attending: INTERNAL MEDICINE
Payer: COMMERCIAL

## 2022-08-19 PROCEDURE — 93798 PHYS/QHP OP CAR RHAB W/ECG: CPT | Performed by: OCCUPATIONAL THERAPIST

## 2022-08-22 ENCOUNTER — HOSPITAL ENCOUNTER (OUTPATIENT)
Dept: CARDIAC REHAB | Facility: CLINIC | Age: 67
Discharge: HOME | End: 2022-08-22
Attending: INTERNAL MEDICINE
Payer: COMMERCIAL

## 2022-08-22 PROCEDURE — 93798 PHYS/QHP OP CAR RHAB W/ECG: CPT

## 2022-08-22 PROCEDURE — 93797 PHYS/QHP OP CAR RHAB WO ECG: CPT

## 2022-08-24 ENCOUNTER — HOSPITAL ENCOUNTER (OUTPATIENT)
Dept: CARDIAC REHAB | Facility: CLINIC | Age: 67
Discharge: HOME | End: 2022-08-24
Attending: INTERNAL MEDICINE
Payer: COMMERCIAL

## 2022-08-24 PROCEDURE — 93798 PHYS/QHP OP CAR RHAB W/ECG: CPT | Performed by: REHABILITATION PRACTITIONER

## 2022-08-26 ENCOUNTER — HOSPITAL ENCOUNTER (OUTPATIENT)
Dept: CARDIAC REHAB | Facility: CLINIC | Age: 67
Discharge: HOME | End: 2022-08-26
Attending: INTERNAL MEDICINE
Payer: COMMERCIAL

## 2022-08-26 PROCEDURE — 93798 PHYS/QHP OP CAR RHAB W/ECG: CPT

## 2022-08-29 ENCOUNTER — HOSPITAL ENCOUNTER (OUTPATIENT)
Dept: CARDIAC REHAB | Facility: CLINIC | Age: 67
Discharge: HOME | End: 2022-08-29
Attending: INTERNAL MEDICINE
Payer: COMMERCIAL

## 2022-08-29 PROCEDURE — 93798 PHYS/QHP OP CAR RHAB W/ECG: CPT | Performed by: REHABILITATION PRACTITIONER

## 2022-09-06 DIAGNOSIS — I25.10 CORONARY ARTERY DISEASE INVOLVING NATIVE CORONARY ARTERY OF NATIVE HEART WITHOUT ANGINA PECTORIS: ICD-10-CM

## 2022-09-06 RX ORDER — METOPROLOL SUCCINATE 25 MG/1
12.5 TABLET, EXTENDED RELEASE ORAL DAILY
Qty: 45 TABLET | Refills: 3 | Status: SHIPPED | OUTPATIENT
Start: 2022-09-06 | End: 2023-09-01

## 2022-09-06 RX ORDER — ATORVASTATIN CALCIUM 40 MG/1
40 TABLET, FILM COATED ORAL DAILY
Qty: 90 TABLET | Refills: 3 | Status: SHIPPED | OUTPATIENT
Start: 2022-09-06 | End: 2023-09-01

## 2022-09-09 ENCOUNTER — HOSPITAL ENCOUNTER (OUTPATIENT)
Dept: CARDIAC REHAB | Facility: CLINIC | Age: 67
Discharge: HOME | End: 2022-09-09
Attending: INTERNAL MEDICINE
Payer: COMMERCIAL

## 2022-09-09 PROCEDURE — 93798 PHYS/QHP OP CAR RHAB W/ECG: CPT

## 2022-10-16 ENCOUNTER — HEALTH MAINTENANCE LETTER (OUTPATIENT)
Age: 67
End: 2022-10-16

## 2023-02-15 ENCOUNTER — OFFICE VISIT (OUTPATIENT)
Dept: FAMILY MEDICINE | Facility: CLINIC | Age: 68
End: 2023-02-15
Payer: COMMERCIAL

## 2023-02-15 VITALS
TEMPERATURE: 98.3 F | HEART RATE: 77 BPM | BODY MASS INDEX: 33.8 KG/M2 | HEIGHT: 68 IN | SYSTOLIC BLOOD PRESSURE: 122 MMHG | DIASTOLIC BLOOD PRESSURE: 84 MMHG | RESPIRATION RATE: 18 BRPM | WEIGHT: 223 LBS | OXYGEN SATURATION: 99 %

## 2023-02-15 DIAGNOSIS — M77.11 LATERAL EPICONDYLITIS OF RIGHT ELBOW: Primary | ICD-10-CM

## 2023-02-15 PROCEDURE — 99213 OFFICE O/P EST LOW 20 MIN: CPT | Performed by: NURSE PRACTITIONER

## 2023-02-15 NOTE — PROGRESS NOTES
"  Assessment & Plan     Lateral epicondylitis of right elbow:  Discussed wearing tennis elbow strep during the day, apply heat or ice 3 times a day for 30 minutes, take tylenol 650 mg at hs prn, apply arnicare topical prn, stop use of thigh machine (likely causes of onset).   - Wrist/Arm Supplies Order Tennis Elbow Arm Band; Right  Follow up in 6 weeks if symptom get worse or do not improve.       Susan Haase, APRN CNP  [facility] ROSELIA Romero is a 68 year old  presenting for the following health issues:  Musculoskeletal Problem      History of Present Illness       Reason for visit:  Right forearm and some shoulder pain    She eats 2-3 servings of fruits and vegetables daily.She consumes 2 sweetened beverage(s) daily.She exercises with enough effort to increase her heart rate 10 to 19 minutes per day.  She exercises with enough effort to increase her heart rate 3 or less days per week.   She is taking medications regularly.     Right forearm pain for past 2 weeks.  Feels that the pain is getting worse.  Left handed.  Was using a thigh master machine for about a month, that required pulling back of her bilateral upper arms,stopped doing this machine 2 weeks ago since she felt it was contributing to the pain.    Denies numbness or tingling in fingers.  Has applied heat with some relief.    Sometimes wakes up at night due to pain when laying on the right side.      Review of Systems   CONSTITUTIONAL: NEGATIVE for fever, chills, change in weight  RESP: NEGATIVE for significant cough or SOB  CV: NEGATIVE for chest pain, palpitations or peripheral edema  MUSCULOSKELETAL: see HPI  NEURO: NEGATIVE for weakness, dizziness or paresthesias      Objective    LMP 07/07/2008   Body mass index is 33.91 kg/m .   /84   Pulse 77   Temp 98.3  F (36.8  C)   Resp 18   Ht 1.727 m (5' 8\")   Wt 101.2 kg (223 lb)   LMP 07/07/2008   SpO2 99%   BMI 33.91 kg/m    Physical Exam   GENERAL: " healthy, alert and no distress  RESP: lungs clear to auscultation - no rales, rhonchi or wheezes  CV: regular rate and rhythm, normal S1 S2, no S3 or S4, no murmur, click or rub, no peripheral edema and peripheral pulses strong  MS: right arm with tenderness upon palpation of the lateral epicondyle extending distal and proximal to the elbow.  No edema or erythema.  Full ROM of the elbow and shoulder.   NEURO: BUE with normal strength and ton.  Mentation intact and speech normal

## 2023-05-25 ASSESSMENT — ENCOUNTER SYMPTOMS
NERVOUS/ANXIOUS: 0
SORE THROAT: 0
JOINT SWELLING: 0
SHORTNESS OF BREATH: 0
HEMATOCHEZIA: 0
PARESTHESIAS: 0
HEADACHES: 0
NAUSEA: 0
HEARTBURN: 0
DIARRHEA: 0
FEVER: 0
PALPITATIONS: 0
DIZZINESS: 0
WEAKNESS: 0
EYE PAIN: 0
HEMATURIA: 0
ARTHRALGIAS: 0
CHILLS: 0
BREAST MASS: 0
COUGH: 0
ABDOMINAL PAIN: 0
CONSTIPATION: 0
FREQUENCY: 0
MYALGIAS: 0
DYSURIA: 0

## 2023-05-25 ASSESSMENT — ACTIVITIES OF DAILY LIVING (ADL): CURRENT_FUNCTION: NO ASSISTANCE NEEDED

## 2023-05-26 ENCOUNTER — OFFICE VISIT (OUTPATIENT)
Dept: INTERNAL MEDICINE | Facility: CLINIC | Age: 68
End: 2023-05-26
Payer: COMMERCIAL

## 2023-05-26 DIAGNOSIS — E78.5 HYPERLIPIDEMIA LDL GOAL <70: ICD-10-CM

## 2023-05-26 DIAGNOSIS — Z78.0 ASYMPTOMATIC POSTMENOPAUSAL STATUS: ICD-10-CM

## 2023-05-26 DIAGNOSIS — I25.10 CORONARY ARTERY DISEASE INVOLVING NATIVE CORONARY ARTERY OF NATIVE HEART WITHOUT ANGINA PECTORIS: ICD-10-CM

## 2023-05-26 DIAGNOSIS — Z00.00 ENCOUNTER FOR ANNUAL WELLNESS EXAM IN MEDICARE PATIENT: Primary | ICD-10-CM

## 2023-05-26 DIAGNOSIS — Z95.2 AORTIC VALVE REPLACED: ICD-10-CM

## 2023-05-26 DIAGNOSIS — Z12.31 ENCOUNTER FOR SCREENING MAMMOGRAM FOR BREAST CANCER: ICD-10-CM

## 2023-05-26 DIAGNOSIS — Z00.00 ENCOUNTER FOR MEDICARE ANNUAL WELLNESS EXAM: ICD-10-CM

## 2023-05-26 DIAGNOSIS — I27.20 PULMONARY HYPERTENSION, UNSPECIFIED (H): ICD-10-CM

## 2023-05-26 DIAGNOSIS — Z12.11 SCREEN FOR COLON CANCER: ICD-10-CM

## 2023-05-26 DIAGNOSIS — I10 BENIGN ESSENTIAL HYPERTENSION: ICD-10-CM

## 2023-05-26 PROBLEM — Z98.890 STATUS POST CORONARY ANGIOGRAM: Status: RESOLVED | Noted: 2022-05-09 | Resolved: 2023-05-26

## 2023-05-26 PROBLEM — E78.00 HYPERCHOLESTEREMIA: Status: RESOLVED | Noted: 2020-01-16 | Resolved: 2023-05-26

## 2023-05-26 PROBLEM — R06.09 DOE (DYSPNEA ON EXERTION): Status: RESOLVED | Noted: 2019-02-13 | Resolved: 2023-05-26

## 2023-05-26 PROBLEM — I35.0 NONRHEUMATIC AORTIC VALVE STENOSIS: Status: RESOLVED | Noted: 2019-02-13 | Resolved: 2023-05-26

## 2023-05-26 PROBLEM — R94.39 ABNORMAL STRESS TEST: Status: RESOLVED | Noted: 2019-02-20 | Resolved: 2023-05-26

## 2023-05-26 LAB
ANION GAP SERPL CALCULATED.3IONS-SCNC: 13 MMOL/L (ref 7–15)
BUN SERPL-MCNC: 15.6 MG/DL (ref 8–23)
CALCIUM SERPL-MCNC: 10.2 MG/DL (ref 8.8–10.2)
CHLORIDE SERPL-SCNC: 104 MMOL/L (ref 98–107)
CHOLEST SERPL-MCNC: 156 MG/DL
CREAT SERPL-MCNC: 0.82 MG/DL (ref 0.51–0.95)
DEPRECATED HCO3 PLAS-SCNC: 24 MMOL/L (ref 22–29)
GFR SERPL CREATININE-BSD FRML MDRD: 78 ML/MIN/1.73M2
GLUCOSE SERPL-MCNC: 87 MG/DL (ref 70–99)
HDLC SERPL-MCNC: 85 MG/DL
LDLC SERPL CALC-MCNC: 59 MG/DL
NONHDLC SERPL-MCNC: 71 MG/DL
POTASSIUM SERPL-SCNC: 4.3 MMOL/L (ref 3.4–5.3)
SODIUM SERPL-SCNC: 141 MMOL/L (ref 136–145)
TRIGL SERPL-MCNC: 61 MG/DL

## 2023-05-26 PROCEDURE — 80061 LIPID PANEL: CPT | Performed by: INTERNAL MEDICINE

## 2023-05-26 PROCEDURE — 80048 BASIC METABOLIC PNL TOTAL CA: CPT | Performed by: INTERNAL MEDICINE

## 2023-05-26 PROCEDURE — 82570 ASSAY OF URINE CREATININE: CPT | Performed by: INTERNAL MEDICINE

## 2023-05-26 PROCEDURE — 82043 UR ALBUMIN QUANTITATIVE: CPT | Performed by: INTERNAL MEDICINE

## 2023-05-26 PROCEDURE — G0438 PPPS, INITIAL VISIT: HCPCS | Performed by: INTERNAL MEDICINE

## 2023-05-26 PROCEDURE — 36415 COLL VENOUS BLD VENIPUNCTURE: CPT | Performed by: INTERNAL MEDICINE

## 2023-05-26 PROCEDURE — 99214 OFFICE O/P EST MOD 30 MIN: CPT | Mod: 25 | Performed by: INTERNAL MEDICINE

## 2023-05-26 RX ORDER — LISINOPRIL 5 MG/1
5 TABLET ORAL DAILY
Qty: 30 TABLET | Refills: 0 | Status: SHIPPED | OUTPATIENT
Start: 2023-05-26 | End: 2023-06-26

## 2023-05-26 RX ORDER — AMOXICILLIN 500 MG/1
CAPSULE ORAL
Qty: 8 CAPSULE | Refills: 3 | Status: SHIPPED | OUTPATIENT
Start: 2023-05-26 | End: 2024-07-09

## 2023-05-26 ASSESSMENT — ENCOUNTER SYMPTOMS
FEVER: 0
BREAST MASS: 0
HEADACHES: 0
COUGH: 0
JOINT SWELLING: 0
PALPITATIONS: 0
NAUSEA: 0
PARESTHESIAS: 0
SORE THROAT: 0
CONSTIPATION: 0
ARTHRALGIAS: 0
MYALGIAS: 0
WEAKNESS: 0
HEMATURIA: 0
NERVOUS/ANXIOUS: 0
ABDOMINAL PAIN: 0
HEMATOCHEZIA: 0
CHILLS: 0
HEARTBURN: 0
DIZZINESS: 0
FREQUENCY: 0
DYSURIA: 0
DIARRHEA: 0
EYE PAIN: 0
SHORTNESS OF BREATH: 0

## 2023-05-26 ASSESSMENT — ACTIVITIES OF DAILY LIVING (ADL): CURRENT_FUNCTION: NO ASSISTANCE NEEDED

## 2023-05-26 NOTE — PROGRESS NOTES
"SUBJECTIVE:   Heather is a 68 year old who presents for Preventive Visit.      5/26/2023     9:16 AM   Additional Questions   Roomed by Rosie AMEZQUITA   Patient has been advised of split billing requirements and indicates understanding: Yes  Are you in the first 12 months of your Medicare coverage?  Yes,  Visual Acuity:  Right Eye: 20/30   Left Eye: 20/30  Both Eyes: 20/15    Healthy Habits:     In general, how would you rate your overall health?  Good    Frequency of exercise:  2-3 days/week    Duration of exercise:  15-30 minutes    Do you usually eat at least 4 servings of fruit and vegetables a day, include whole grains    & fiber and avoid regularly eating high fat or \"junk\" foods?  Yes    Taking medications regularly:  Yes    Medication side effects:  Muscle aches    Ability to successfully perform activities of daily living:  No assistance needed    Home Safety:  No safety concerns identified    Hearing Impairment:  No hearing concerns    In the past 6 months, have you been bothered by leaking of urine?  No    In general, how would you rate your overall mental or emotional health?  Good      PHQ-2 Total Score: 0    Additional concerns today:  No    Problems:  1.  Aortic valve replacement: She had TAVR a year ago.  She has done very well with that with improvement in dyspnea.  She is due for cardiology follow-up next month with echo.  She was told that she does need dental prophylaxis and does have appointment coming up and needs a prescription.  2.  Pulmonary hypertension: Echocardiogram will be coming up soon to reassess this after her TAVR  3.  Coronary artery disease: She was identified as having CAD without significant stenoses as part of her aortic valve work-up.  She is doing well without any chest pains  4.  Hyperlipidemia: Stable on medication  5.  Hypertension: Blood pressures been well controlled      Patient Active Problem List   Diagnosis     Benign essential hypertension     HCD (health care " "directive)     Hyperlipidemia LDL goal <70     Class 1 obesity with serious comorbidity and body mass index (BMI) of 33.0 to 33.9 in adult     Coronary artery disease involving native coronary artery of native heart without angina pectoris     Aortic valve replaced     Pulmonary hypertension, unspecified (H)     Current Outpatient Medications   Medication Sig Dispense Refill     amoxicillin (AMOXIL) 500 MG capsule 4 capsules one hour before dental procedure 8 capsule 3     aspirin 81 MG EC tablet Take 81 mg by mouth daily       atorvastatin (LIPITOR) 40 MG tablet Take 1 tablet (40 mg) by mouth daily 90 tablet 3     lisinopril (ZESTRIL) 5 MG tablet Take 1 tablet (5 mg) by mouth daily 30 tablet 0     metoprolol succinate ER (TOPROL XL) 25 MG 24 hr tablet Take 0.5 tablets (12.5 mg) by mouth daily 45 tablet 3     MULTIPLE MINERALS-VITAMINS OR TABS Take 1 tablet by mouth daily       nitroGLYcerin (NITROSTAT) 0.4 MG sublingual tablet One tablet under the tongue every 5 minutes if needed for chest pain. May repeat every 5 minutes for a maximum of 3 doses in 15 minutes\" 25 tablet 3      Have you ever done Advance Care Planning? (For example, a Health Directive, POLST, or a discussion with a medical provider or your loved ones about your wishes): No, advance care planning information given to patient to review.  Patient declined advance care planning discussion at this time.       Fall risk  Fallen 2 or more times in the past year?: No  Any fall with injury in the past year?: No    Cognitive Screening   1) Repeat 3 items (Leader, Season, Table)    2) Clock draw: ABNORMAL her long hand and short hand were switched  3) 3 item recall: Recalls 3 objects  Results: 3 items recalled: COGNITIVE IMPAIRMENT LESS LIKELY    Mini-CogTM Copyright S Melinda. Licensed by the author for use in [facility] [employer]; reprinted with permission (dixie@.[facility]). All rights reserved.      Do you have sleep apnea, excessive snoring or daytime " drowsiness?: no    Reviewed and updated as needed this visit by clinical staff                  Reviewed and updated as needed this visit by Provider                 Social History     Tobacco Use     Smoking status: Former     Years: 5.00     Types: Cigarettes     Smokeless tobacco: Never     Tobacco comments:     quit 20 years ago   Vaping Use     Vaping status: Never Used   Substance Use Topics     Alcohol use: Yes     Comment: wine every week             5/25/2023    10:55 AM   Alcohol Use   Prescreen: >3 drinks/day or >7 drinks/week? Not Applicable     Do you have a current opioid prescription? No  Do you use any other controlled substances or medications that are not prescribed by a provider? None              Current providers sharing in care for this patient include:   Patient Care Team:  Sylvia Bird MD as PCP - General (Internal Medicine)  Rich Palmer MD as MD (Cardiovascular & Thoracic Surgery)  Santa Zhu EP as Cardiac Rehabilitation Therapist  Geneva Maurer PA-C as Assigned Heart and Vascular Provider  Melissa Trinidad MD as Assigned PCP    The following health maintenance items are reviewed in Epic and correct as of today:  Health Maintenance   Topic Date Due     DEXA  Never done     Pneumococcal Vaccine: 65+ Years (1 - PCV) Never done     ZOSTER IMMUNIZATION (1 of 2) Never done     MICROALBUMIN  02/01/2020     MAMMO SCREENING  02/01/2020     COVID-19 Vaccine (3 - Moderna series) 10/22/2021     MEDICARE ANNUAL WELLNESS VISIT  02/03/2022     INFLUENZA VACCINE (1) 09/01/2022     LUNG CANCER SCREENING  03/31/2023     ADVANCE CARE PLANNING  04/09/2023     COLORECTAL CANCER SCREENING  05/10/2023     ANNUAL REVIEW OF HM ORDERS  02/15/2024     FALL RISK ASSESSMENT  05/26/2024     DTAP/TDAP/TD IMMUNIZATION (3 - Td or Tdap) 12/14/2025     LIPID  03/15/2027     HEPATITIS C SCREENING  Completed     PHQ-2 (once per calendar year)  Completed     IPV IMMUNIZATION  Aged Out      "MENINGITIS IMMUNIZATION  Aged Out     PAP  Discontinued               5/25/2023    10:58 AM   Breast CA Risk Assessment (FHS-7)   Do you have a family history of breast, colon, or ovarian cancer? No / Unknown         Mammogram Screening: Recommended mammography every 1-2 years with patient discussion and risk factor consideration  Pertinent mammograms are reviewed under the imaging tab.    Review of Systems   Constitutional: Negative for chills and fever.   HENT: Negative for congestion, ear pain, hearing loss and sore throat.    Eyes: Negative for pain and visual disturbance.   Respiratory: Negative for cough and shortness of breath.    Cardiovascular: Negative for chest pain, palpitations and peripheral edema.   Gastrointestinal: Negative for abdominal pain, constipation, diarrhea, heartburn, hematochezia and nausea.   Breasts:  Negative for tenderness, breast mass and discharge.   Genitourinary: Negative for dysuria, frequency, genital sores, hematuria, pelvic pain, urgency, vaginal bleeding and vaginal discharge.   Musculoskeletal: Negative for arthralgias, joint swelling and myalgias.   Skin: Negative for rash.   Neurological: Negative for dizziness, weakness, headaches and paresthesias.   Psychiatric/Behavioral: Negative for mood changes. The patient is not nervous/anxious.          OBJECTIVE:   /78   Pulse 66   Temp 97.9  F (36.6  C) (Oral)   Resp 20   Ht 1.727 m (5' 8\")   Wt 99.8 kg (220 lb)   LMP 07/07/2008   SpO2 100%   BMI 33.45 kg/m   Estimated body mass index is 33.91 kg/m  as calculated from the following:    Height as of 2/15/23: 1.727 m (5' 8\").    Weight as of 2/15/23: 101.2 kg (223 lb).  Physical Exam    HEENT: extraocular movements are intact, pupils equal and reactive to light and accommodation, TMs clear  NECK: Neck supple. No adenopathy. Thyroid symmetric, normal size,, Carotids without bruits.  PULMONARY: clear to auscultation  CARDIAC: regular rate and rhythm and no murmurs, " clicks, or gallops  PULSES: 2/2 throughout  BACK: no spinal or CVAT  ABDOMINAL: Soft, nontender.  Normal bowel sounds.  No hepatosplenomegaly or abnormal masses          ASSESSMENT / PLAN:   (Z00.00) Encounter for annual wellness exam in Medicare patient  (primary encounter diagnosis)  Comment: She is due for colon screening, we discussed options and she elects to go ahead with colonoscopy.  Due for mammogram, bone density.  She is due for several vaccines but declines to have them done today but will get them scheduled at a pharmacy  Plan:     (I27.20) Pulmonary hypertension, unspecified (H)  Comment: Currently doing well with her breathing, echocardiogram pending  Plan:     (I10) Benign essential hypertension  Comment: Well-controlled, continue medication  Plan: lisinopril (ZESTRIL) 5 MG tablet, Basic         metabolic panel  (Ca, Cl, CO2, Creat, Gluc, K,         Na, BUN), Albumin Random Urine Quantitative         with Creat Ratio            (Z95.2) Aortic valve replaced  Comment: Stable, provided antibiotic for dental treatments  Plan: amoxicillin (AMOXIL) 500 MG capsule            (I25.10) Coronary artery disease involving native coronary artery of native heart without angina pectoris  Comment: Doing well without symptoms  Plan:     (E78.5) Hyperlipidemia LDL goal <70  Comment: Labs today, continue medication  Plan: Lipid panel reflex to direct LDL Fasting            (Z12.31) Encounter for screening mammogram for breast cancer  Comment:   Plan: *MA Screening Digital Bilateral            (Z78.0) Asymptomatic postmenopausal status  Comment:   Plan: DX Hip/Pelvis/Spine            (Z12.11) Screen for colon cancer  Comment:   Plan: Colonoscopy Screening  Referral              Patient has been advised of split billing requirements and indicates understanding: Yes      COUNSELING:  Reviewed preventive health counseling, as reflected in patient instructions      BMI:   Estimated body mass index is 33.91 kg/m   "as calculated from the following:    Height as of 2/15/23: 1.727 m (5' 8\").    Weight as of 2/15/23: 101.2 kg (223 lb).   Weight management plan: Discussed healthy diet and exercise guidelines      She reports that she has quit smoking. Her smoking use included cigarettes. She has never used smokeless tobacco.      Appropriate preventive services were discussed with this patient, including applicable screening as appropriate for cardiovascular disease, diabetes, osteopenia/osteoporosis, and glaucoma.  As appropriate for age/gender, discussed screening for colorectal cancer, prostate cancer, breast cancer, and cervical cancer. Checklist reviewing preventive services available has been given to the patient.    Reviewed patients plan of care and provided an AVS. The Basic Care Plan (routine screening as documented in Health Maintenance) for Heather meets the Care Plan requirement. This Care Plan has been established and reviewed with the Patient.      Sylvia Bird MD  [facility]    Identified Health Risks:    I have reviewed Opioid Use Disorder and Substance Use Disorder risk factors and made any needed referrals.     "

## 2023-05-26 NOTE — NURSING NOTE
"Chief Complaint   Patient presents with     Medicare Visit     fasting     initial BP (!) 140/78   Pulse 66   Temp 97.9  F (36.6  C) (Oral)   Resp 20   Ht 1.727 m (5' 8\")   Wt 99.8 kg (220 lb)   LMP 07/07/2008   SpO2 100%   BMI 33.45 kg/m   Estimated body mass index is 33.45 kg/m  as calculated from the following:    Height as of this encounter: 1.727 m (5' 8\").    Weight as of this encounter: 99.8 kg (220 lb)..  bp completed using cuff size large  HARLEEN VILLELA LPN  "

## 2023-05-27 LAB
CREAT UR-MCNC: 161 MG/DL
MICROALBUMIN UR-MCNC: <12 MG/L
MICROALBUMIN/CREAT UR: NORMAL MG/G{CREAT}

## 2023-05-30 VITALS
HEIGHT: 68 IN | WEIGHT: 220 LBS | RESPIRATION RATE: 20 BRPM | OXYGEN SATURATION: 100 % | DIASTOLIC BLOOD PRESSURE: 78 MMHG | TEMPERATURE: 97.9 F | SYSTOLIC BLOOD PRESSURE: 138 MMHG | BODY MASS INDEX: 33.34 KG/M2 | HEART RATE: 66 BPM

## 2023-05-30 NOTE — PATIENT INSTRUCTIONS
Patient Education   Personalized Prevention Plan  You are due for the preventive services outlined below.  Your care team is available to assist you in scheduling these services.  If you have already completed any of these items, please share that information with your care team to update in your medical record.  Health Maintenance Due   Topic Date Due     Osteoporosis Screening  Never done     Pneumococcal Vaccine (1 - PCV) Never done     Zoster (Shingles) Vaccine (1 of 2) Never done     Mammogram  02/01/2020     COVID-19 Vaccine (3 - Moderna series) 10/22/2021     Colorectal Cancer Screening  05/10/2023

## 2023-06-01 ENCOUNTER — HEALTH MAINTENANCE LETTER (OUTPATIENT)
Age: 68
End: 2023-06-01

## 2023-06-12 ENCOUNTER — HOSPITAL ENCOUNTER (OUTPATIENT)
Dept: MAMMOGRAPHY | Facility: CLINIC | Age: 68
Discharge: HOME | End: 2023-06-12
Attending: INTERNAL MEDICINE | Admitting: INTERNAL MEDICINE
Payer: COMMERCIAL

## 2023-06-12 DIAGNOSIS — Z12.31 ENCOUNTER FOR SCREENING MAMMOGRAM FOR BREAST CANCER: ICD-10-CM

## 2023-06-12 PROCEDURE — 77067 SCR MAMMO BI INCL CAD: CPT

## 2023-06-13 ENCOUNTER — HOSPITAL ENCOUNTER (OUTPATIENT)
Dept: CARDIOLOGY | Facility: CLINIC | Age: 68
Discharge: HOME | End: 2023-06-13
Attending: PHYSICIAN ASSISTANT | Admitting: PHYSICIAN ASSISTANT
Payer: COMMERCIAL

## 2023-06-13 DIAGNOSIS — Z95.2 S/P TAVR (TRANSCATHETER AORTIC VALVE REPLACEMENT): ICD-10-CM

## 2023-06-13 DIAGNOSIS — E78.00 HYPERCHOLESTEREMIA: ICD-10-CM

## 2023-06-13 DIAGNOSIS — I25.10 CORONARY ARTERY DISEASE INVOLVING NATIVE CORONARY ARTERY OF NATIVE HEART WITHOUT ANGINA PECTORIS: ICD-10-CM

## 2023-06-13 DIAGNOSIS — I10 ESSENTIAL HYPERTENSION, BENIGN: ICD-10-CM

## 2023-06-13 LAB — LVEF ECHO: NORMAL

## 2023-06-13 PROCEDURE — 93306 TTE W/DOPPLER COMPLETE: CPT | Mod: 26 | Performed by: INTERNAL MEDICINE

## 2023-06-13 PROCEDURE — 93306 TTE W/DOPPLER COMPLETE: CPT

## 2023-06-14 ENCOUNTER — LAB (OUTPATIENT)
Dept: LAB | Facility: CLINIC | Age: 68
End: 2023-06-14
Payer: COMMERCIAL

## 2023-06-14 DIAGNOSIS — I10 ESSENTIAL HYPERTENSION, BENIGN: ICD-10-CM

## 2023-06-14 DIAGNOSIS — E78.00 HYPERCHOLESTEREMIA: ICD-10-CM

## 2023-06-14 DIAGNOSIS — Z95.2 S/P TAVR (TRANSCATHETER AORTIC VALVE REPLACEMENT): ICD-10-CM

## 2023-06-14 DIAGNOSIS — I25.10 CORONARY ARTERY DISEASE INVOLVING NATIVE CORONARY ARTERY OF NATIVE HEART WITHOUT ANGINA PECTORIS: ICD-10-CM

## 2023-06-14 LAB
ANION GAP SERPL CALCULATED.3IONS-SCNC: 6 MMOL/L (ref 7–15)
BUN SERPL-MCNC: 11.1 MG/DL (ref 8–23)
CALCIUM SERPL-MCNC: 9.7 MG/DL (ref 8.8–10.2)
CHLORIDE SERPL-SCNC: 105 MMOL/L (ref 98–107)
CREAT SERPL-MCNC: 0.81 MG/DL (ref 0.51–0.95)
DEPRECATED HCO3 PLAS-SCNC: 27 MMOL/L (ref 22–29)
ERYTHROCYTE [DISTWIDTH] IN BLOOD BY AUTOMATED COUNT: 12.3 % (ref 10–15)
GFR SERPL CREATININE-BSD FRML MDRD: 80 ML/MIN/1.73M2
GLUCOSE SERPL-MCNC: 97 MG/DL (ref 70–99)
HCT VFR BLD AUTO: 42.7 % (ref 35–47)
HGB BLD-MCNC: 14.2 G/DL (ref 11.7–15.7)
MCH RBC QN AUTO: 32.8 PG (ref 26.5–33)
MCHC RBC AUTO-ENTMCNC: 33.3 G/DL (ref 31.5–36.5)
MCV RBC AUTO: 99 FL (ref 78–100)
PLATELET # BLD AUTO: 236 10E3/UL (ref 150–450)
POTASSIUM SERPL-SCNC: 4.6 MMOL/L (ref 3.4–5.3)
RBC # BLD AUTO: 4.33 10E6/UL (ref 3.8–5.2)
SODIUM SERPL-SCNC: 138 MMOL/L (ref 136–145)
WBC # BLD AUTO: 5.7 10E3/UL (ref 4–11)

## 2023-06-14 PROCEDURE — 36415 COLL VENOUS BLD VENIPUNCTURE: CPT | Performed by: PHYSICIAN ASSISTANT

## 2023-06-14 PROCEDURE — 80048 BASIC METABOLIC PNL TOTAL CA: CPT | Performed by: PHYSICIAN ASSISTANT

## 2023-06-14 PROCEDURE — 85027 COMPLETE CBC AUTOMATED: CPT | Performed by: PHYSICIAN ASSISTANT

## 2023-06-16 ENCOUNTER — OFFICE VISIT (OUTPATIENT)
Dept: CARDIOLOGY | Facility: CLINIC | Age: 68
End: 2023-06-16
Attending: PHYSICIAN ASSISTANT
Payer: COMMERCIAL

## 2023-06-16 VITALS
HEART RATE: 69 BPM | WEIGHT: 224 LBS | HEIGHT: 68 IN | SYSTOLIC BLOOD PRESSURE: 140 MMHG | DIASTOLIC BLOOD PRESSURE: 72 MMHG | BODY MASS INDEX: 33.95 KG/M2 | OXYGEN SATURATION: 99 %

## 2023-06-16 DIAGNOSIS — E78.00 HYPERCHOLESTEREMIA: ICD-10-CM

## 2023-06-16 DIAGNOSIS — I10 ESSENTIAL HYPERTENSION, BENIGN: ICD-10-CM

## 2023-06-16 DIAGNOSIS — I25.10 CORONARY ARTERY DISEASE INVOLVING NATIVE CORONARY ARTERY OF NATIVE HEART WITHOUT ANGINA PECTORIS: ICD-10-CM

## 2023-06-16 DIAGNOSIS — Z95.2 S/P TAVR (TRANSCATHETER AORTIC VALVE REPLACEMENT): Primary | ICD-10-CM

## 2023-06-16 PROCEDURE — 93000 ELECTROCARDIOGRAM COMPLETE: CPT | Performed by: PHYSICIAN ASSISTANT

## 2023-06-16 PROCEDURE — 99214 OFFICE O/P EST MOD 30 MIN: CPT | Performed by: PHYSICIAN ASSISTANT

## 2023-06-16 NOTE — PROGRESS NOTES
Primary Cardiologist: Dr. Dave    Reason for Visit: 1 year TAVR follow up with repeat echo, labs, and ECG    History of Present Illness:   Heather is a pleasant 68 year old female with past medical history notable for history of severe symptomatic aortic stenosis (s/p TAVR, received 23 mm ES3 valve in 6/2022 with no complications), CAD (history of PCI to LAD in 2019), hypertension, and hyperlipidemia.    Heather returns to clinic today, stating she is doing well.  She denies shortness of breath, chest discomfort, palpitations, lightheadedness, shortness of breath, syncope, or bleeding issues.    Assessment and Plan:  Heather is a pleasant 68 year old female with past medical history notable for history of severe symptomatic aortic stenosis (s/p TAVR, received 23 mm ES3 valve in 6/2022 with no complications), CAD (history of PCI to LAD in 2019), hypertension, and hyperlipidemia.    Heather appears to be doing well from a cardiac standpoint.  I have reviewed her recent echocardiogram report and images which showed normal bioprosthetic aortic valve function in the setting of preserved LVEF.  I have reviewed her BMP as well as CBC results in detail which showed normal renal function, normal electrolytes, and no evidence of anemia.  Her blood pressure was elevated today and I rechecked it towards the end of the visit.  It improved to 140/72 but it still seems to be above her goal.  I have instructed her to check her blood pressure daily for the next 1 to 2 weeks and to send us a Diabeto message with an update.  Depending on her numbers we may consider adjusting her antihypertensive medications.  She will otherwise follow-up with us in cardiology clinic in 1 year with repeat echocardiogram.    This note was completed in part using Dragon voice recognition software. Although reviewed after completion, some word and grammatical errors may occur.    Orders this Visit:  Orders Placed This Encounter   Procedures     EKG  "12-lead complete w/read - Clinics (performed today)     No orders of the defined types were placed in this encounter.    There are no discontinued medications.      Encounter Diagnoses   Name Primary?     S/P TAVR (transcatheter aortic valve replacement)      Coronary artery disease involving native coronary artery of native heart without angina pectoris      Essential hypertension, benign      Hypercholesteremia        CURRENT MEDICATIONS:  Current Outpatient Medications   Medication Sig Dispense Refill     amoxicillin (AMOXIL) 500 MG capsule 4 capsules one hour before dental procedure 8 capsule 3     aspirin 81 MG EC tablet Take 81 mg by mouth daily       atorvastatin (LIPITOR) 40 MG tablet Take 1 tablet (40 mg) by mouth daily 90 tablet 3     lisinopril (ZESTRIL) 5 MG tablet Take 1 tablet (5 mg) by mouth daily 30 tablet 0     metoprolol succinate ER (TOPROL XL) 25 MG 24 hr tablet Take 0.5 tablets (12.5 mg) by mouth daily 45 tablet 3     MULTIPLE MINERALS-VITAMINS OR TABS Take 1 tablet by mouth daily       nitroGLYcerin (NITROSTAT) 0.4 MG sublingual tablet One tablet under the tongue every 5 minutes if needed for chest pain. May repeat every 5 minutes for a maximum of 3 doses in 15 minutes\" 25 tablet 3       ALLERGIES     Allergies   Allergen Reactions     No Known Drug Allergy        PAST MEDICAL HISTORY:  Past Medical History:   Diagnosis Date     Aortic valve stenosis      Arthritis      Essential hypertension, benign      Fracture, tibia, shaft      History of colposcopy with cervical biopsy 02/22/2011    AMRITA I     Papanicolaou smear of vagina with atypical squamous cells cannot exclude high grade squamous intraepithelial lesion (ASC-H) 01/31/2011     Uncomplicated asthma        PAST SURGICAL HISTORY:  Past Surgical History:   Procedure Laterality Date     CV CORONARY ANGIOGRAM N/A 3/11/2019    Procedure: Coronary Angiogram;  Surgeon: Adolph Dave MD;  Location: [facility] CARDIAC CATH LAB     CV CORONARY " ANGIOGRAM N/A 5/9/2022    Procedure: Coronary Angiogram;  Surgeon: Madonna Garay MD;  Location:  HEART CARDIAC CATH LAB     CV PCI N/A 5/9/2022    Procedure: Percutaneous Coronary Intervention;  Surgeon: Madonna Garay MD;  Location:  HEART CARDIAC CATH LAB     CV PCI ANGIOPLASTY N/A 3/11/2019    Procedure: Percutaneous Coronary Intervention;  Surgeon: Adolph Dave MD;  Location:  HEART CARDIAC CATH LAB     CV TRANSCATHETER AORTIC VALVE REPLACEMENT-FEMORAL APPROACH N/A 5/31/2022    Procedure: Transcatheter Aortic Valve Replacement-Femoral Approach;  Surgeon: Madonna Garay MD;  Location:  HEART CARDIAC CATH LAB     ZZC APPENDECTOMY         FAMILY HISTORY:  Family History   Problem Relation Age of Onset     C.A.D. Mother         early 60's     Lipids Mother      Heart Disease Mother 68        heart bypass surgery     Hypertension Mother      Thyroid Disease Mother      Heart Disease Father         valvular disease     Thyroid Disease Paternal Aunt      Heart Disease Maternal Grandmother         heart attack       SOCIAL HISTORY:  Social History     Socioeconomic History     Marital status:      Spouse name: None     Number of children: 2     Years of education: None     Highest education level: None   Occupational History     Employer: [employer]   Tobacco Use     Smoking status: Former     Years: 5.00     Types: Cigarettes     Smokeless tobacco: Never     Tobacco comments:     quit 20 years ago   Vaping Use     Vaping status: Never Used   Substance and Sexual Activity     Alcohol use: Yes     Comment: wine every week     Drug use: No     Sexual activity: Yes     Birth control/protection: Condom   Other Topics Concern     Exercise Yes     Seat Belt Yes       Review of Systems:  Skin:        Eyes:       ENT:       Respiratory:  Positive for dyspnea on exertion  Cardiovascular:  Negative    Gastroenterology:      Genitourinary:       Musculoskeletal:       Neurologic:       Psychiatric:  "      Heme/Lymph/Imm:       Endocrine:         Physical Exam:  Vitals: BP (!) 162/82 (BP Location: Right arm, Patient Position: Sitting, Cuff Size: Adult Regular)   Pulse 69   Ht 1.727 m (5' 8\")   Wt 101.6 kg (224 lb)   LMP 07/07/2008   SpO2 99%   BMI 34.06 kg/m       GEN:  NAD  NECK: No JVD  C/V:  Regular rate and rhythm, no murmur, rub or gallop.  RESP: Clear to auscultation bilaterally without wheezing, rales, or rhonchi.  GI: Abdomen soft, nontender, nondistended.   EXTREM: No pitting LE edema.   NEURO: Alert and oriented, cooperative. No obvious focal deficits.   PSYCH: Normal affect.  SKIN: Warm and dry.       Recent Lab Results:  LIPID RESULTS:  Lab Results   Component Value Date    CHOL 156 05/26/2023    CHOL 135 01/21/2021    HDL 85 05/26/2023    HDL 75 01/21/2021    LDL 59 05/26/2023    LDL 48 01/21/2021    TRIG 61 05/26/2023    TRIG 59 01/21/2021    CHOLHDLRATIO 3.3 01/31/2011       LIVER ENZYME RESULTS:  Lab Results   Component Value Date    AST 18 05/26/2022    AST 18 03/11/2019    ALT 26 05/26/2022    ALT 34 06/19/2019       CBC RESULTS:  Lab Results   Component Value Date    WBC 5.7 06/14/2023    WBC 6.0 03/11/2019    RBC 4.33 06/14/2023    RBC 4.43 03/11/2019    HGB 14.2 06/14/2023    HGB 14.4 03/11/2019    HCT 42.7 06/14/2023    HCT 42.0 03/11/2019    MCV 99 06/14/2023    MCV 95 03/11/2019    MCH 32.8 06/14/2023    MCH 32.5 03/11/2019    MCHC 33.3 06/14/2023    MCHC 34.3 03/11/2019    RDW 12.3 06/14/2023    RDW 12.4 03/11/2019     06/14/2023     03/11/2019       BMP RESULTS:  Lab Results   Component Value Date     06/14/2023     01/21/2021    POTASSIUM 4.6 06/14/2023    POTASSIUM 4.3 07/07/2022    POTASSIUM 4.2 01/21/2021    CHLORIDE 105 06/14/2023    CHLORIDE 107 07/07/2022    CHLORIDE 106 01/21/2021    CO2 27 06/14/2023    CO2 29 07/07/2022    CO2 33 (H) 01/21/2021    ANIONGAP 6 (L) 06/14/2023    ANIONGAP 2 (L) 07/07/2022    ANIONGAP 1 (L) 01/21/2021    GLC 97 " 06/14/2023    GLC 90 07/07/2022    GLC 95 01/21/2021    BUN 11.1 06/14/2023    BUN 19 07/07/2022    BUN 19 01/21/2021    CR 0.81 06/14/2023    CR 0.82 01/21/2021    GFRESTIMATED 80 06/14/2023    GFRESTIMATED 76 01/21/2021    GFRESTBLACK 88 01/21/2021    RALPH 9.7 06/14/2023    RALPH 9.7 01/21/2021        A1C RESULTS:  Lab Results   Component Value Date    A1C 5.7 (H) 03/11/2019       INR RESULTS:  Lab Results   Component Value Date    INR 1.06 05/26/2022    INR 1.05 05/09/2022    INR 0.96 03/11/2019           Geneva Maurer PA-C  June 16, 2023

## 2023-06-16 NOTE — PATIENT INSTRUCTIONS
Today's Plan:   1) Check blood pressure daily for the next 1-2 weeks and send us a message with your blood pressure numbers to see if we need adjust your medications.     If you have questions or concerns please call my nurse team at [phone]    Scheduling phone number: [phone]  Reminder: Please bring in all current medications, over the counter supplements and vitamin bottles to your next appointment.    It was a pleasure seeing you today!

## 2023-06-16 NOTE — LETTER
6/16/2023    Sylvia Bird MD  [address]  [facility] [address]    RE: Heather Callejasjulia       Dear Colleague,     I had the pleasure of seeing Heather Bauer in the [facility].  Primary Cardiologist: Dr. Dave    Reason for Visit: 1 year TAVR follow up with repeat echo, labs, and ECG    History of Present Illness:   Heather is a pleasant 68 year old female with past medical history notable for history of severe symptomatic aortic stenosis (s/p TAVR, received 23 mm ES3 valve in 6/2022 with no complications), CAD (history of PCI to LAD in 2019), hypertension, and hyperlipidemia.    Heather returns to clinic today, stating she is doing well.  She denies shortness of breath, chest discomfort, palpitations, lightheadedness, shortness of breath, syncope, or bleeding issues.    Assessment and Plan:  Heather is a pleasant 68 year old female with past medical history notable for history of severe symptomatic aortic stenosis (s/p TAVR, received 23 mm ES3 valve in 6/2022 with no complications), CAD (history of PCI to LAD in 2019), hypertension, and hyperlipidemia.    Heather appears to be doing well from a cardiac standpoint.  I have reviewed her recent echocardiogram report and images which showed normal bioprosthetic aortic valve function in the setting of preserved LVEF.  I have reviewed her BMP as well as CBC results in detail which showed normal renal function, normal electrolytes, and no evidence of anemia.  Her blood pressure was elevated today and I rechecked it towards the end of the visit.  It improved to 140/72 but it still seems to be above her goal.  I have instructed her to check her blood pressure daily for the next 1 to 2 weeks and to send us a [employer] message with an update.  Depending on her numbers we may consider adjusting her antihypertensive medications.  She will otherwise follow-up with us in cardiology clinic in 1 year with repeat echocardiogram.    This note was  "completed in part using Dragon voice recognition software. Although reviewed after completion, some word and grammatical errors may occur.    Orders this Visit:  Orders Placed This Encounter   Procedures    EKG 12-lead complete w/read - Clinics (performed today)     No orders of the defined types were placed in this encounter.    There are no discontinued medications.      Encounter Diagnoses   Name Primary?    S/P TAVR (transcatheter aortic valve replacement)     Coronary artery disease involving native coronary artery of native heart without angina pectoris     Essential hypertension, benign     Hypercholesteremia        CURRENT MEDICATIONS:  Current Outpatient Medications   Medication Sig Dispense Refill    amoxicillin (AMOXIL) 500 MG capsule 4 capsules one hour before dental procedure 8 capsule 3    aspirin 81 MG EC tablet Take 81 mg by mouth daily      atorvastatin (LIPITOR) 40 MG tablet Take 1 tablet (40 mg) by mouth daily 90 tablet 3    lisinopril (ZESTRIL) 5 MG tablet Take 1 tablet (5 mg) by mouth daily 30 tablet 0    metoprolol succinate ER (TOPROL XL) 25 MG 24 hr tablet Take 0.5 tablets (12.5 mg) by mouth daily 45 tablet 3    MULTIPLE MINERALS-VITAMINS OR TABS Take 1 tablet by mouth daily      nitroGLYcerin (NITROSTAT) 0.4 MG sublingual tablet One tablet under the tongue every 5 minutes if needed for chest pain. May repeat every 5 minutes for a maximum of 3 doses in 15 minutes\" 25 tablet 3       ALLERGIES     Allergies   Allergen Reactions    No Known Drug Allergy        PAST MEDICAL HISTORY:  Past Medical History:   Diagnosis Date    Aortic valve stenosis     Arthritis     Essential hypertension, benign     Fracture, tibia, shaft     History of colposcopy with cervical biopsy 02/22/2011    AMRITA I    Papanicolaou smear of vagina with atypical squamous cells cannot exclude high grade squamous intraepithelial lesion (ASC-H) 01/31/2011    Uncomplicated asthma        PAST SURGICAL HISTORY:  Past Surgical " History:   Procedure Laterality Date    CV CORONARY ANGIOGRAM N/A 3/11/2019    Procedure: Coronary Angiogram;  Surgeon: Adolph Dave MD;  Location:  [facility]    CV CORONARY ANGIOGRAM N/A 5/9/2022    Procedure: Coronary Angiogram;  Surgeon: Madonna Garay MD;  Location: [facility] CARDIAC CATH LAB    CV PCI N/A 5/9/2022    Procedure: Percutaneous Coronary Intervention;  Surgeon: Madonna Garay MD;  Location: [facility] CARDIAC CATH LAB    CV PCI ANGIOPLASTY N/A 3/11/2019    Procedure: Percutaneous Coronary Intervention;  Surgeon: Adolph Dave MD;  Location:  HE[facility] CARDIAC CATH LAB    CV TRANSCATHETER AORTIC VALVE REPLACEMENT-FEMORAL APPROACH N/A 5/31/2022    Procedure: Transcatheter Aortic Valve Replacement-Femoral Approach;  Surgeon: Madonna Garay MD;  Location:  HEART CARDIAC CATH LAB    ZZC APPENDECTOMY         FAMILY HISTORY:  Family History   Problem Relation Age of Onset    C.A.D. Mother         early 60's    Lipids Mother     Heart Disease Mother 68        heart bypass surgery    Hypertension Mother     Thyroid Disease Mother     Heart Disease Father         valvular disease    Thyroid Disease Paternal Aunt     Heart Disease Maternal Grandmother         heart attack       SOCIAL HISTORY:  Social History     Socioeconomic History    Marital status:      Spouse name: None    Number of children: 2    Years of education: None    Highest education level: None   Occupational History     Employer: [employer]   Tobacco Use    Smoking status: Former     Years: 5.00     Types: Cigarettes    Smokeless tobacco: Never    Tobacco comments:     quit 20 years ago   Vaping Use    Vaping status: Never Used   Substance and Sexual Activity    Alcohol use: Yes     Comment: wine every week    Drug use: No    Sexual activity: Yes     Birth control/protection: Condom   Other Topics Concern    Exercise Yes    Seat Belt Yes       Review of Systems:  Skin:        Eyes:       ENT:      "  Respiratory:  Positive for dyspnea on exertion  Cardiovascular:  Negative    Gastroenterology:      Genitourinary:       Musculoskeletal:       Neurologic:       Psychiatric:       Heme/Lymph/Imm:       Endocrine:         Physical Exam:  Vitals: BP (!) 162/82 (BP Location: Right arm, Patient Position: Sitting, Cuff Size: Adult Regular)   Pulse 69   Ht 1.727 m (5' 8\")   Wt 101.6 kg (224 lb)   LMP 07/07/2008   SpO2 99%   BMI 34.06 kg/m       GEN:  NAD  NECK: No JVD  C/V:  Regular rate and rhythm, no murmur, rub or gallop.  RESP: Clear to auscultation bilaterally without wheezing, rales, or rhonchi.  GI: Abdomen soft, nontender, nondistended.   EXTREM: No pitting LE edema.   NEURO: Alert and oriented, cooperative. No obvious focal deficits.   PSYCH: Normal affect.  SKIN: Warm and dry.       Recent Lab Results:  LIPID RESULTS:  Lab Results   Component Value Date    CHOL 156 05/26/2023    CHOL 135 01/21/2021    HDL 85 05/26/2023    HDL 75 01/21/2021    LDL 59 05/26/2023    LDL 48 01/21/2021    TRIG 61 05/26/2023    TRIG 59 01/21/2021    CHOLHDLRATIO 3.3 01/31/2011       LIVER ENZYME RESULTS:  Lab Results   Component Value Date    AST 18 05/26/2022    AST 18 03/11/2019    ALT 26 05/26/2022    ALT 34 06/19/2019       CBC RESULTS:  Lab Results   Component Value Date    WBC 5.7 06/14/2023    WBC 6.0 03/11/2019    RBC 4.33 06/14/2023    RBC 4.43 03/11/2019    HGB 14.2 06/14/2023    HGB 14.4 03/11/2019    HCT 42.7 06/14/2023    HCT 42.0 03/11/2019    MCV 99 06/14/2023    MCV 95 03/11/2019    MCH 32.8 06/14/2023    MCH 32.5 03/11/2019    MCHC 33.3 06/14/2023    MCHC 34.3 03/11/2019    RDW 12.3 06/14/2023    RDW 12.4 03/11/2019     06/14/2023     03/11/2019       BMP RESULTS:  Lab Results   Component Value Date     06/14/2023     01/21/2021    POTASSIUM 4.6 06/14/2023    POTASSIUM 4.3 07/07/2022    POTASSIUM 4.2 01/21/2021    CHLORIDE 105 06/14/2023    CHLORIDE 107 07/07/2022    CHLORIDE 106 " 01/21/2021    CO2 27 06/14/2023    CO2 29 07/07/2022    CO2 33 (H) 01/21/2021    ANIONGAP 6 (L) 06/14/2023    ANIONGAP 2 (L) 07/07/2022    ANIONGAP 1 (L) 01/21/2021    GLC 97 06/14/2023    GLC 90 07/07/2022    GLC 95 01/21/2021    BUN 11.1 06/14/2023    BUN 19 07/07/2022    BUN 19 01/21/2021    CR 0.81 06/14/2023    CR 0.82 01/21/2021    GFRESTIMATED 80 06/14/2023    GFRESTIMATED 76 01/21/2021    GFRESTBLACK 88 01/21/2021    RALPH 9.7 06/14/2023    RALPH 9.7 01/21/2021        A1C RESULTS:  Lab Results   Component Value Date    A1C 5.7 (H) 03/11/2019       INR RESULTS:  Lab Results   Component Value Date    INR 1.06 05/26/2022    INR 1.05 05/09/2022    INR 0.96 03/11/2019           Geneva Maurer PA-C  June 16, 2023         Thank you for allowing me to participate in the care of your patient.      Sincerely,     Geneva Maurer PA-C     [facility]  cc:   Geneva Maurer PA-C  [address] [place] [address]

## 2023-06-22 ENCOUNTER — MEDICAL CORRESPONDENCE (OUTPATIENT)
Dept: HEALTH INFORMATION MANAGEMENT | Facility: CLINIC | Age: 68
End: 2023-06-22
Payer: COMMERCIAL

## 2023-06-26 ENCOUNTER — MYC MEDICAL ADVICE (OUTPATIENT)
Dept: CARDIOLOGY | Facility: CLINIC | Age: 68
End: 2023-06-26
Payer: COMMERCIAL

## 2023-06-26 DIAGNOSIS — I10 BENIGN ESSENTIAL HYPERTENSION: ICD-10-CM

## 2023-06-26 RX ORDER — LISINOPRIL 5 MG/1
5 TABLET ORAL DAILY
Qty: 30 TABLET | Refills: 0 | Status: SHIPPED | OUTPATIENT
Start: 2023-06-26 | End: 2023-07-20

## 2023-06-26 NOTE — TELEPHONE ENCOUNTER
6/26/23 [employer] message with blood pressure updates sent to Advanced Practice Provider (PA-C) for review. = plan at last office visit to review patient home blood pressures and decide on possible medication changes. Patient requested lisinopril refill while awaiting provider review.  Sylvia Daley RN 06/26/23 12:26 PM

## 2023-07-13 ENCOUNTER — ANCILLARY PROCEDURE (OUTPATIENT)
Dept: BONE DENSITY | Facility: CLINIC | Age: 68
End: 2023-07-13
Attending: INTERNAL MEDICINE
Payer: COMMERCIAL

## 2023-07-13 DIAGNOSIS — Z78.0 ASYMPTOMATIC POSTMENOPAUSAL STATUS: ICD-10-CM

## 2023-07-13 PROCEDURE — 77080 DXA BONE DENSITY AXIAL: CPT | Performed by: INTERNAL MEDICINE

## 2023-07-19 DIAGNOSIS — I10 BENIGN ESSENTIAL HYPERTENSION: ICD-10-CM

## 2023-07-19 NOTE — TELEPHONE ENCOUNTER
Routing refill request to provider for review/approval because:  ACE Inhibitors (Including Combos) Protocol Failed      Blood pressure under 140/90 in past 12 months        BP Readings from Last 3 Encounters:   06/16/23 (!) 140/72

## 2023-07-20 RX ORDER — LISINOPRIL 5 MG/1
TABLET ORAL
Qty: 90 TABLET | Refills: 3 | Status: SHIPPED | OUTPATIENT
Start: 2023-07-20 | End: 2024-07-09

## 2023-07-20 NOTE — TELEPHONE ENCOUNTER
[facility] Cardiology Refill Guideline reviewed.  Medication meets criteria for refill. Bibi Bautista RN on 7/20/2023 at 12:41 PM        July 20, 2023  Geneva Maurer PA-C  to [facility] Team 7    AB    7/20/23  8:47 AM  Her home BP's were good based on recent [employer] message. OK to refill at current dose.     Geneva

## 2023-08-03 ENCOUNTER — TELEPHONE (OUTPATIENT)
Dept: GASTROENTEROLOGY | Facility: CLINIC | Age: 68
End: 2023-08-03
Payer: COMMERCIAL

## 2023-08-03 NOTE — TELEPHONE ENCOUNTER
"Endoscopy Scheduling Screen    Have you had a positive Covid test in the last 14 days?  No    Are you active on MyChart?   Yes    What insurance is in the chart?  Other:  [facility]    Ordering/Referring Provider: Pelon   (If ordering provider performs procedure, schedule with ordering provider unless otherwise instructed. )    BMI: Estimated body mass index is 34.06 kg/m  as calculated from the following:    Height as of 6/16/23: 1.727 m (5' 8\").    Weight as of 6/16/23: 101.6 kg (224 lb).     Sedation Ordered  moderate sedation.   If patient BMI > 50 do not schedule in ASC.    Are you taking any prescription medications for pain?   No    Are you taking methadone or Suboxone?  No    Do you have a history of malignant hyperthermia or adverse reaction to anesthesia?  No    (Females) Are you currently pregnant?   No     Have you been diagnosed or told you have pulmonary hypertension?   No    Do you have an LVAD?  No    Have you been told you have moderate to severe sleep apnea?  No    Have you been told you have COPD, asthma, or any other lung disease?  No    Do you have any heart conditions?  Yes -aortic valve replaced 5/31/22 and stent placed before the valve    In the past 6 months, have you had any hospitalizations for heart related issues including cardiomyopathy, heart attack, or stent placement?  No    Do you have any implantable devices in your body (pacemaker, ICD)?  No    Do you take nitroglycerine?  No    Have you ever had or are you awaiting a heart or lung transplant?   No    Have you had a stroke or transient ischemic attack (TIA aka \"mini stroke\" in the last 6 months?   No    Have you been diagnosed with or been told you have cirrhosis of the liver?   NO    Are you currently on dialysis?   No    Do you need assistance transferring?   No    BMI: Estimated body mass index is 34.06 kg/m  as calculated from the following:    Height as of 6/16/23: 1.727 m (5' 8\").    Weight as of 6/16/23: 101.6 kg (224 lb).     Is " patients BMI > 40 and scheduling location [facility]?  No    Do you take the medication Phentermine, Ozempic or Wegovy?  No    Do you take the medication Naltrexone?  No    Do you take blood thinners?  No      Prep   Are you currently on dialysis or do you have chronic kidney disease?  No    Do you have a diagnosis of diabetes?  No    Do you have a diagnosis of cystic fibrosis (CF)?  No    On a regular basis do you go 3 -5 days between bowel movements?  No    BMI > 40?  No    Preferred Pharmacy:    [facility] [address] [place] [facility] [address] [facility]  [address] [facility] [address]  Phone: [phone] Fax: [phone]      Final Scheduling Details   Colonoscopy prep sent?  MiraLAX (No Mag Citrate)    Procedure scheduled  Colonoscopy    Surgeon:  Estevan     Date of procedure:  11/6/23     Schedule PAC:   No    Location  [facility]    Sedation   Moderate Sedation    Patient Reminders:   You will receive a call from a Nurse to review instructions and health history.  This assessment must be completed prior to your procedure.  Failure to complete the Nurse assessment may result in the procedure being cancelled.      On the day of your procedure, please designate an adult(s) who can drive you home stay with you for the next 24 hours. The medicines used in the exam will make you sleepy. You will not be able to drive.      You cannot take public transportation, ride share services, or non-medical taxi service without a responsible caregiver.  Medical transport services are allowed with the requirement that a responsible caregiver will receive you at your destination.  We require that drivers and caregivers are confirmed prior to your procedure.

## 2023-09-01 DIAGNOSIS — I25.10 CORONARY ARTERY DISEASE INVOLVING NATIVE CORONARY ARTERY OF NATIVE HEART WITHOUT ANGINA PECTORIS: ICD-10-CM

## 2023-09-01 RX ORDER — METOPROLOL SUCCINATE 25 MG/1
12.5 TABLET, EXTENDED RELEASE ORAL DAILY
Qty: 45 TABLET | Refills: 3 | Status: SHIPPED | OUTPATIENT
Start: 2023-09-01 | End: 2024-07-09

## 2023-09-01 RX ORDER — ATORVASTATIN CALCIUM 40 MG/1
40 TABLET, FILM COATED ORAL DAILY
Qty: 90 TABLET | Refills: 3 | Status: SHIPPED | OUTPATIENT
Start: 2023-09-01 | End: 2024-07-09

## 2023-10-18 ENCOUNTER — MYC MEDICAL ADVICE (OUTPATIENT)
Dept: CARDIOLOGY | Facility: CLINIC | Age: 68
End: 2023-10-18

## 2023-11-06 ENCOUNTER — HOSPITAL ENCOUNTER (OUTPATIENT)
Facility: CLINIC | Age: 68
Discharge: HOME | End: 2023-11-06
Attending: INTERNAL MEDICINE | Admitting: INTERNAL MEDICINE
Payer: COMMERCIAL

## 2023-11-06 VITALS
BODY MASS INDEX: 32.58 KG/M2 | SYSTOLIC BLOOD PRESSURE: 130 MMHG | OXYGEN SATURATION: 99 % | HEART RATE: 54 BPM | RESPIRATION RATE: 18 BRPM | HEIGHT: 68 IN | DIASTOLIC BLOOD PRESSURE: 66 MMHG | WEIGHT: 215 LBS

## 2023-11-06 LAB — COLONOSCOPY: NORMAL

## 2023-11-06 PROCEDURE — 45385 COLONOSCOPY W/LESION REMOVAL: CPT | Mod: PT | Performed by: INTERNAL MEDICINE

## 2023-11-06 PROCEDURE — G0500 MOD SEDAT ENDO SERVICE >5YRS: HCPCS | Performed by: INTERNAL MEDICINE

## 2023-11-06 PROCEDURE — 250N000011 HC RX IP 250 OP 636: Performed by: INTERNAL MEDICINE

## 2023-11-06 PROCEDURE — 45380 COLONOSCOPY AND BIOPSY: CPT | Performed by: INTERNAL MEDICINE

## 2023-11-06 PROCEDURE — 45384 COLONOSCOPY W/LESION REMOVAL: CPT | Performed by: INTERNAL MEDICINE

## 2023-11-06 PROCEDURE — 88305 TISSUE EXAM BY PATHOLOGIST: CPT | Mod: TC | Performed by: INTERNAL MEDICINE

## 2023-11-06 RX ORDER — ONDANSETRON 4 MG/1
4 TABLET, ORALLY DISINTEGRATING ORAL EVERY 6 HOURS PRN
Status: DISCONTINUED | OUTPATIENT
Start: 2023-11-06 | End: 2023-11-06 | Stop reason: HOSPADM

## 2023-11-06 RX ORDER — NALOXONE HYDROCHLORIDE 0.4 MG/ML
0.4 INJECTION, SOLUTION INTRAMUSCULAR; INTRAVENOUS; SUBCUTANEOUS
Status: DISCONTINUED | OUTPATIENT
Start: 2023-11-06 | End: 2023-11-06 | Stop reason: HOSPADM

## 2023-11-06 RX ORDER — LIDOCAINE 40 MG/G
CREAM TOPICAL
Status: DISCONTINUED | OUTPATIENT
Start: 2023-11-06 | End: 2023-11-06 | Stop reason: HOSPADM

## 2023-11-06 RX ORDER — SIMETHICONE 40MG/0.6ML
133 SUSPENSION, DROPS(FINAL DOSAGE FORM)(ML) ORAL
Status: DISCONTINUED | OUTPATIENT
Start: 2023-11-06 | End: 2023-11-06 | Stop reason: HOSPADM

## 2023-11-06 RX ORDER — DIPHENHYDRAMINE HYDROCHLORIDE 50 MG/ML
25-50 INJECTION INTRAMUSCULAR; INTRAVENOUS
Status: DISCONTINUED | OUTPATIENT
Start: 2023-11-06 | End: 2023-11-06 | Stop reason: HOSPADM

## 2023-11-06 RX ORDER — NALOXONE HYDROCHLORIDE 0.4 MG/ML
0.2 INJECTION, SOLUTION INTRAMUSCULAR; INTRAVENOUS; SUBCUTANEOUS
Status: DISCONTINUED | OUTPATIENT
Start: 2023-11-06 | End: 2023-11-06 | Stop reason: HOSPADM

## 2023-11-06 RX ORDER — ATROPINE SULFATE 0.1 MG/ML
1 INJECTION INTRAVENOUS
Status: DISCONTINUED | OUTPATIENT
Start: 2023-11-06 | End: 2023-11-06 | Stop reason: HOSPADM

## 2023-11-06 RX ORDER — PROCHLORPERAZINE MALEATE 5 MG
5 TABLET ORAL EVERY 6 HOURS PRN
Status: DISCONTINUED | OUTPATIENT
Start: 2023-11-06 | End: 2023-11-06 | Stop reason: HOSPADM

## 2023-11-06 RX ORDER — FENTANYL CITRATE 50 UG/ML
50-100 INJECTION, SOLUTION INTRAMUSCULAR; INTRAVENOUS EVERY 5 MIN PRN
Status: DISCONTINUED | OUTPATIENT
Start: 2023-11-06 | End: 2023-11-06 | Stop reason: HOSPADM

## 2023-11-06 RX ORDER — ONDANSETRON 2 MG/ML
4 INJECTION INTRAMUSCULAR; INTRAVENOUS EVERY 6 HOURS PRN
Status: DISCONTINUED | OUTPATIENT
Start: 2023-11-06 | End: 2023-11-06 | Stop reason: HOSPADM

## 2023-11-06 RX ORDER — ONDANSETRON 2 MG/ML
4 INJECTION INTRAMUSCULAR; INTRAVENOUS
Status: DISCONTINUED | OUTPATIENT
Start: 2023-11-06 | End: 2023-11-06 | Stop reason: HOSPADM

## 2023-11-06 RX ORDER — FLUMAZENIL 0.1 MG/ML
0.2 INJECTION, SOLUTION INTRAVENOUS
Status: DISCONTINUED | OUTPATIENT
Start: 2023-11-06 | End: 2023-11-06 | Stop reason: HOSPADM

## 2023-11-06 RX ORDER — EPINEPHRINE 1 MG/ML
0.1 INJECTION, SOLUTION INTRAMUSCULAR; SUBCUTANEOUS
Status: DISCONTINUED | OUTPATIENT
Start: 2023-11-06 | End: 2023-11-06 | Stop reason: HOSPADM

## 2023-11-06 RX ADMIN — MIDAZOLAM HYDROCHLORIDE 2 MG: 1 INJECTION, SOLUTION INTRAMUSCULAR; INTRAVENOUS at 08:02

## 2023-11-06 RX ADMIN — MIDAZOLAM HYDROCHLORIDE 2 MG: 1 INJECTION, SOLUTION INTRAMUSCULAR; INTRAVENOUS at 08:08

## 2023-11-06 RX ADMIN — FENTANYL CITRATE 100 MCG: 50 INJECTION, SOLUTION INTRAMUSCULAR; INTRAVENOUS at 08:02

## 2023-11-06 ASSESSMENT — ACTIVITIES OF DAILY LIVING (ADL): ADLS_ACUITY_SCORE: 35

## 2023-11-06 NOTE — DISCHARGE INSTRUCTIONS
Colon Polyps: Care Instructions  Your Care Instructions     Colon polyps are growths in the colon or the rectum. The cause of most colon polyps is not known, and most people who get them do not have any problems. But a certain kind can turn into cancer. For this reason, regular testing for colon polyps is important for people as they get older. It is also important for anyone who has an increased risk for colon cancer.  Polyps are usually found through routine colon cancer screening tests. Although most colon polyps are not cancerous, they are usually removed and then tested for cancer. Screening for colon cancer saves lives because the cancer can usually be cured if it is caught early.  If you have a polyp that is the type that can turn into cancer, you may need more tests to examine your entire colon. The doctor will remove any other polyps that are found, and you will be tested more often.  Follow-up care is a key part of your treatment and safety. Be sure to make and go to all appointments, and call your doctor if you are having problems. It's also a good idea to know your test results and keep a list of the medicines you take.  How can you care for yourself at home?  Regular exams to look for colon polyps are the best way to prevent polyps from turning into colon cancer. These can include stool tests, sigmoidoscopy, colonoscopy, and CT colonography. Talk with your doctor about a testing schedule that is right for you.  To prevent polyps  There is no home treatment that can prevent colon polyps. But these steps may help lower your risk for cancer.  Stay active. Being active can help you get to and stay at a healthy weight. Try to exercise on most days of the week. Walking is a good choice.  Eat well. Choose a variety of vegetables, fruits, legumes (such as peas and beans), fish, poultry, and whole grains.  Do not smoke. If you need help quitting, talk to your doctor about stop-smoking programs and medicines.  "These can increase your chances of quitting for good.  If you drink alcohol, limit how much you drink. Limit alcohol to 2 drinks a day for men and 1 drink a day for women.  When should you call for help?   Call your doctor now or seek immediate medical care if:    You have severe belly pain.     Your stools are maroon or very bloody.   Watch closely for changes in your health, and be sure to contact your doctor if:    You have a fever.     You have nausea or vomiting.     You have a change in bowel habits (new constipation or diarrhea).     Your symptoms get worse or are not improving as expected.   Where can you learn more?  Go to https://www.[employer].net/patiented  Enter C571 in the search box to learn more about \"Colon Polyps: Care Instructions.\"  Current as of: March 22, 2023               Content Version: 13.7    1028-0175 [employer].   Care instructions adapted under license by your healthcare professional. If you have questions about a medical condition or this instruction, always ask your healthcare professional. [employer] disclaims any warranty or liability for your use of this information.      "

## 2023-11-06 NOTE — H&P
[facility] Anesthesia Pre-op History and Physical    Heather Bauer MRN# [id number]   Age: 68 year old YOB: 1955      Date of Surgery: today [facility]      Date of Exam 11/6/2023 Facility (Same day)       Home clinic: unknown  Primary care provider: Casandra Roche [facility]         Chief Complaint and/or Reason for Procedure:   No chief complaint on file.           Active problem list:     Patient Active Problem List    Diagnosis Date Noted    Pulmonary hypertension, unspecified (H) 05/26/2023     Priority: Medium    Aortic valve replaced 05/31/2022     Priority: Medium    Coronary artery disease involving native coronary artery of native heart without angina pectoris 01/16/2020     Priority: Medium    Class 1 obesity with serious comorbidity and body mass index (BMI) of 33.0 to 33.9 in adult 04/14/2018     Priority: Medium    Hyperlipidemia LDL goal <70 11/30/2017     Priority: Medium    HCD (health care directive) 02/03/2012     Priority: Medium     Pt received HCD and will return when complete.     Tete Lopez MA    IMO Regulatory Load OCT 2020      Benign essential hypertension 02/06/2003     Priority: Medium            Medications (include herbals and vitamins):   Any Plavix use in the last 7 days? No     Current Facility-Administered Medications   Medication    atropine injection 1 mg    benzocaine 20% (HURRICAINE/TOPEX) 20 % spray 0.5 mL    diphenhydrAMINE (BENADRYL) injection 25-50 mg    EPINEPHrine (Anaphylaxis) (ADRENALIN) injection (vial) 0.1 mg    fentaNYL (PF) (SUBLIMAZE) injection  mcg    flumazenil (ROMAZICON) injection 0.2 mg    glucagon injection 0.5 mg    midazolam (VERSED) injection 0.5-2 mg    naloxone (NARCAN) injection 0.2 mg    Or    naloxone (NARCAN) injection 0.4 mg    Or    naloxone (NARCAN) injection 0.2 mg    Or    naloxone (NARCAN) injection 0.4 mg    simethicone (MYLICON) suspension 133 mg    sodium chloride  "(PF) 0.9% PF flush 3 mL    sodium chloride 0.9% BOLUS 500 mL             Allergies:      Allergies   Allergen Reactions    No Known Drug Allergy      Allergy to Latex? No  Allergy to tape?   No  Intolerances: NKDA            Physical Exam:   All vitals have been reviewed  Patient Vitals for the past 8 hrs:   BP Pulse Resp SpO2 Height Weight   11/06/23 0735 (!) 150/77 70 17 100 % -- --   11/06/23 0730 (!) 149/84 70 20 100 % 1.715 m (5' 7.5\") 97.5 kg (215 lb)     No intake/output data recorded.  Airway assessment:   Patient is able to open mouth wide  Patient is able to stick out tongue}              Lab / Radiology Results:             Anesthetic risk and/or ASA classification:       Cathy Barreto MD      "

## 2023-11-07 LAB
PATH REPORT.COMMENTS IMP SPEC: NORMAL
PATH REPORT.COMMENTS IMP SPEC: NORMAL
PATH REPORT.FINAL DX SPEC: NORMAL
PATH REPORT.GROSS SPEC: NORMAL
PATH REPORT.MICROSCOPIC SPEC OTHER STN: NORMAL
PATH REPORT.RELEVANT HX SPEC: NORMAL
PHOTO IMAGE: NORMAL

## 2023-11-07 PROCEDURE — 88305 TISSUE EXAM BY PATHOLOGIST: CPT | Mod: 26 | Performed by: PATHOLOGY

## 2024-04-16 ENCOUNTER — ALLIED HEALTH/NURSE VISIT (OUTPATIENT)
Dept: INTERNAL MEDICINE | Facility: CLINIC | Age: 69
End: 2024-04-16
Payer: COMMERCIAL

## 2024-04-16 VITALS — DIASTOLIC BLOOD PRESSURE: 78 MMHG | SYSTOLIC BLOOD PRESSURE: 154 MMHG

## 2024-04-16 DIAGNOSIS — I10 BENIGN ESSENTIAL HYPERTENSION: Primary | ICD-10-CM

## 2024-04-16 PROCEDURE — 99207 PR NO CHARGE NURSE ONLY: CPT

## 2024-04-26 ENCOUNTER — PATIENT OUTREACH (OUTPATIENT)
Dept: CARE COORDINATION | Facility: CLINIC | Age: 69
End: 2024-04-26
Payer: COMMERCIAL

## 2024-05-13 ENCOUNTER — PATIENT OUTREACH (OUTPATIENT)
Dept: CARE COORDINATION | Facility: CLINIC | Age: 69
End: 2024-05-13
Payer: COMMERCIAL

## 2024-05-28 NOTE — CONFIDENTIAL NOTE
Patient returned call and left voicemail message with update blood pressure reading.      Last Blood Pressure: 140/72  Last Heart Rate: 69  Date: 6/16/23  Location: [facility]    Today's Blood Pressure: 134/82  Location: Home BP    Patient reported blood pressure updated in Epic. Blood pressure falls within [place] Community Measures guidelines.  Patient will follow up as previously advised.

## 2024-06-10 ENCOUNTER — PATIENT OUTREACH (OUTPATIENT)
Dept: CARE COORDINATION | Facility: CLINIC | Age: 69
End: 2024-06-10
Payer: COMMERCIAL

## 2024-06-11 ENCOUNTER — HOSPITAL ENCOUNTER (OUTPATIENT)
Dept: CARDIOLOGY | Facility: CLINIC | Age: 69
Discharge: HOME | End: 2024-06-11
Attending: PHYSICIAN ASSISTANT | Admitting: PHYSICIAN ASSISTANT
Payer: COMMERCIAL

## 2024-06-11 DIAGNOSIS — Z95.2 S/P TAVR (TRANSCATHETER AORTIC VALVE REPLACEMENT): ICD-10-CM

## 2024-06-11 DIAGNOSIS — E78.00 HYPERCHOLESTEREMIA: ICD-10-CM

## 2024-06-11 DIAGNOSIS — I25.10 CORONARY ARTERY DISEASE INVOLVING NATIVE CORONARY ARTERY OF NATIVE HEART WITHOUT ANGINA PECTORIS: ICD-10-CM

## 2024-06-11 DIAGNOSIS — I10 ESSENTIAL HYPERTENSION, BENIGN: ICD-10-CM

## 2024-06-11 LAB — LVEF ECHO: NORMAL

## 2024-06-11 PROCEDURE — 999N000208 ECHOCARDIOGRAM COMPLETE

## 2024-06-11 PROCEDURE — 93306 TTE W/DOPPLER COMPLETE: CPT | Mod: 26 | Performed by: INTERNAL MEDICINE

## 2024-06-11 PROCEDURE — 255N000002 HC RX 255 OP 636: Performed by: PHYSICIAN ASSISTANT

## 2024-06-11 RX ADMIN — HUMAN ALBUMIN MICROSPHERES AND PERFLUTREN 3 ML: 10; .22 INJECTION, SOLUTION INTRAVENOUS at 09:06

## 2024-06-14 ENCOUNTER — HOSPITAL ENCOUNTER (OUTPATIENT)
Dept: MAMMOGRAPHY | Facility: CLINIC | Age: 69
Discharge: HOME | End: 2024-06-14
Attending: NURSE PRACTITIONER | Admitting: NURSE PRACTITIONER
Payer: COMMERCIAL

## 2024-06-14 DIAGNOSIS — Z12.31 VISIT FOR SCREENING MAMMOGRAM: ICD-10-CM

## 2024-06-14 PROCEDURE — 77063 BREAST TOMOSYNTHESIS BI: CPT

## 2024-07-09 ENCOUNTER — OFFICE VISIT (OUTPATIENT)
Dept: INTERNAL MEDICINE | Facility: CLINIC | Age: 69
End: 2024-07-09
Payer: COMMERCIAL

## 2024-07-09 VITALS
DIASTOLIC BLOOD PRESSURE: 74 MMHG | RESPIRATION RATE: 18 BRPM | OXYGEN SATURATION: 98 % | TEMPERATURE: 97.7 F | SYSTOLIC BLOOD PRESSURE: 136 MMHG | WEIGHT: 218 LBS | BODY MASS INDEX: 33.04 KG/M2 | HEIGHT: 68 IN | HEART RATE: 76 BPM

## 2024-07-09 DIAGNOSIS — I27.20 PULMONARY HYPERTENSION, UNSPECIFIED (H): ICD-10-CM

## 2024-07-09 DIAGNOSIS — Z95.2 AORTIC VALVE REPLACED: ICD-10-CM

## 2024-07-09 DIAGNOSIS — I25.10 CORONARY ARTERY DISEASE INVOLVING NATIVE CORONARY ARTERY OF NATIVE HEART WITHOUT ANGINA PECTORIS: ICD-10-CM

## 2024-07-09 DIAGNOSIS — Z00.00 ROUTINE GENERAL MEDICAL EXAMINATION AT A HEALTH CARE FACILITY: Primary | ICD-10-CM

## 2024-07-09 DIAGNOSIS — I10 BENIGN ESSENTIAL HYPERTENSION: ICD-10-CM

## 2024-07-09 DIAGNOSIS — M21.619 BUNION: ICD-10-CM

## 2024-07-09 LAB
BASOPHILS # BLD AUTO: 0.1 10E3/UL (ref 0–0.2)
BASOPHILS NFR BLD AUTO: 1 %
EOSINOPHIL # BLD AUTO: 0.3 10E3/UL (ref 0–0.7)
EOSINOPHIL NFR BLD AUTO: 5 %
ERYTHROCYTE [DISTWIDTH] IN BLOOD BY AUTOMATED COUNT: 12.1 % (ref 10–15)
HCT VFR BLD AUTO: 41.6 % (ref 35–47)
HGB BLD-MCNC: 14.2 G/DL (ref 11.7–15.7)
IMM GRANULOCYTES # BLD: 0 10E3/UL
IMM GRANULOCYTES NFR BLD: 0 %
LYMPHOCYTES # BLD AUTO: 1.5 10E3/UL (ref 0.8–5.3)
LYMPHOCYTES NFR BLD AUTO: 27 %
MCH RBC QN AUTO: 32.9 PG (ref 26.5–33)
MCHC RBC AUTO-ENTMCNC: 34.1 G/DL (ref 31.5–36.5)
MCV RBC AUTO: 97 FL (ref 78–100)
MONOCYTES # BLD AUTO: 0.3 10E3/UL (ref 0–1.3)
MONOCYTES NFR BLD AUTO: 6 %
NEUTROPHILS # BLD AUTO: 3.3 10E3/UL (ref 1.6–8.3)
NEUTROPHILS NFR BLD AUTO: 62 %
PLATELET # BLD AUTO: 249 10E3/UL (ref 150–450)
RBC # BLD AUTO: 4.31 10E6/UL (ref 3.8–5.2)
WBC # BLD AUTO: 5.4 10E3/UL (ref 4–11)

## 2024-07-09 PROCEDURE — 36415 COLL VENOUS BLD VENIPUNCTURE: CPT | Performed by: NURSE PRACTITIONER

## 2024-07-09 PROCEDURE — 80053 COMPREHEN METABOLIC PANEL: CPT | Performed by: NURSE PRACTITIONER

## 2024-07-09 PROCEDURE — 85025 COMPLETE CBC W/AUTO DIFF WBC: CPT | Performed by: NURSE PRACTITIONER

## 2024-07-09 PROCEDURE — G0439 PPPS, SUBSEQ VISIT: HCPCS | Performed by: NURSE PRACTITIONER

## 2024-07-09 PROCEDURE — 82570 ASSAY OF URINE CREATININE: CPT | Performed by: NURSE PRACTITIONER

## 2024-07-09 PROCEDURE — 99214 OFFICE O/P EST MOD 30 MIN: CPT | Mod: 25 | Performed by: NURSE PRACTITIONER

## 2024-07-09 PROCEDURE — 82043 UR ALBUMIN QUANTITATIVE: CPT | Performed by: NURSE PRACTITIONER

## 2024-07-09 PROCEDURE — 84443 ASSAY THYROID STIM HORMONE: CPT | Performed by: NURSE PRACTITIONER

## 2024-07-09 PROCEDURE — 80061 LIPID PANEL: CPT | Performed by: NURSE PRACTITIONER

## 2024-07-09 RX ORDER — LISINOPRIL 5 MG/1
5 TABLET ORAL DAILY
Qty: 90 TABLET | Refills: 3 | Status: SHIPPED | OUTPATIENT
Start: 2024-07-09

## 2024-07-09 RX ORDER — AMOXICILLIN 500 MG/1
CAPSULE ORAL
Qty: 8 CAPSULE | Refills: 3 | Status: SHIPPED | OUTPATIENT
Start: 2024-07-09

## 2024-07-09 RX ORDER — METOPROLOL SUCCINATE 25 MG/1
12.5 TABLET, EXTENDED RELEASE ORAL DAILY
Qty: 45 TABLET | Refills: 3 | Status: SHIPPED | OUTPATIENT
Start: 2024-07-09

## 2024-07-09 RX ORDER — ATORVASTATIN CALCIUM 40 MG/1
40 TABLET, FILM COATED ORAL DAILY
Qty: 90 TABLET | Refills: 3 | Status: SHIPPED | OUTPATIENT
Start: 2024-07-09

## 2024-07-09 RX ORDER — RESPIRATORY SYNCYTIAL VIRUS VACCINE 120MCG/0.5
0.5 KIT INTRAMUSCULAR ONCE
Qty: 1 EACH | Refills: 0 | Status: CANCELLED | OUTPATIENT
Start: 2024-07-09 | End: 2024-07-09

## 2024-07-09 SDOH — HEALTH STABILITY: PHYSICAL HEALTH: ON AVERAGE, HOW MANY DAYS PER WEEK DO YOU ENGAGE IN MODERATE TO STRENUOUS EXERCISE (LIKE A BRISK WALK)?: 4 DAYS

## 2024-07-09 SDOH — HEALTH STABILITY: PHYSICAL HEALTH: ON AVERAGE, HOW MANY MINUTES DO YOU ENGAGE IN EXERCISE AT THIS LEVEL?: 20 MIN

## 2024-07-09 ASSESSMENT — ANXIETY QUESTIONNAIRES
4. TROUBLE RELAXING: NOT AT ALL
8. IF YOU CHECKED OFF ANY PROBLEMS, HOW DIFFICULT HAVE THESE MADE IT FOR YOU TO DO YOUR WORK, TAKE CARE OF THINGS AT HOME, OR GET ALONG WITH OTHER PEOPLE?: NOT DIFFICULT AT ALL
1. FEELING NERVOUS, ANXIOUS, OR ON EDGE: NOT AT ALL
GAD7 TOTAL SCORE: 0
IF YOU CHECKED OFF ANY PROBLEMS ON THIS QUESTIONNAIRE, HOW DIFFICULT HAVE THESE PROBLEMS MADE IT FOR YOU TO DO YOUR WORK, TAKE CARE OF THINGS AT HOME, OR GET ALONG WITH OTHER PEOPLE: NOT DIFFICULT AT ALL
2. NOT BEING ABLE TO STOP OR CONTROL WORRYING: NOT AT ALL
7. FEELING AFRAID AS IF SOMETHING AWFUL MIGHT HAPPEN: NOT AT ALL
3. WORRYING TOO MUCH ABOUT DIFFERENT THINGS: NOT AT ALL
5. BEING SO RESTLESS THAT IT IS HARD TO SIT STILL: NOT AT ALL
GAD7 TOTAL SCORE: 0
6. BECOMING EASILY ANNOYED OR IRRITABLE: NOT AT ALL
GAD7 TOTAL SCORE: 0
7. FEELING AFRAID AS IF SOMETHING AWFUL MIGHT HAPPEN: NOT AT ALL

## 2024-07-09 ASSESSMENT — SOCIAL DETERMINANTS OF HEALTH (SDOH): HOW OFTEN DO YOU GET TOGETHER WITH FRIENDS OR RELATIVES?: ONCE A WEEK

## 2024-07-09 ASSESSMENT — PATIENT HEALTH QUESTIONNAIRE - PHQ9
SUM OF ALL RESPONSES TO PHQ QUESTIONS 1-9: 0
10. IF YOU CHECKED OFF ANY PROBLEMS, HOW DIFFICULT HAVE THESE PROBLEMS MADE IT FOR YOU TO DO YOUR WORK, TAKE CARE OF THINGS AT HOME, OR GET ALONG WITH OTHER PEOPLE: NOT DIFFICULT AT ALL
SUM OF ALL RESPONSES TO PHQ QUESTIONS 1-9: 0

## 2024-07-09 NOTE — PROGRESS NOTES
Answers submitted by the patient for this visit:  Patient Health Questionnaire (Submitted on 7/9/2024)  If you checked off any problems, how difficult have these problems made it for you to do your work, take care of things at home, or get along with other people?: Not difficult at all  PHQ9 TOTAL SCORE: 0  AVINASH-7 (Submitted on 7/9/2024)  AVINASH 7 TOTAL SCORE: 0  Preventive Care Visit  [facility]  KOBI Hooks CNP, Internal Medicine  Jul 9, 2024      Assessment & Plan     Routine general medical examination at a health care facility    - Lipid panel reflex to direct LDL Fasting; Future  - Comprehensive metabolic panel (BMP + Alb, Alk Phos, ALT, AST, Total. Bili, TP); Future  - TSH with free T4 reflex; Future  - CBC with platelets and differential; Future    Coronary artery disease involving native coronary artery of native heart without angina pectoris  Stable   - Lipid panel reflex to direct LDL Fasting; Future  - metoprolol succinate ER (TOPROL XL) 25 MG 24 hr tablet; Take 0.5 tablets (12.5 mg) by mouth daily  - atorvastatin (LIPITOR) 40 MG tablet; Take 1 tablet (40 mg) by mouth daily    Pulmonary hypertension, unspecified (H)  Stable- recent echo   - Comprehensive metabolic panel (BMP + Alb, Alk Phos, ALT, AST, Total. Bili, TP); Future  - TSH with free T4 reflex; Future  - CBC with platelets and differential; Future    Benign essential hypertension  In good range   - Lipid panel reflex to direct LDL Fasting; Future  - Albumin Random Urine Quantitative with Creat Ratio; Future  - Comprehensive metabolic panel (BMP + Alb, Alk Phos, ALT, AST, Total. Bili, TP); Future  - TSH with free T4 reflex; Future  - CBC with platelets and differential; Future  - lisinopril (ZESTRIL) 5 MG tablet; Take 1 tablet (5 mg) by mouth daily    Aortic valve replaced  Stable   - Comprehensive metabolic panel (BMP + Alb, Alk Phos, ALT, AST, Total. Bili, TP); Future  - TSH with free T4 reflex; Future  - CBC  "with platelets and differential; Future  - amoxicillin (AMOXIL) 500 MG capsule; 4 capsules one hour before dental procedure    [place]  Want to see ortho   - Orthopedic  Referral; Future            BMI  Estimated body mass index is 33.64 kg/m  as calculated from the following:    Height as of this encounter: 1.715 m (5' 7.5\").    Weight as of this encounter: 98.9 kg (218 lb).       Counseling  Appropriate preventive services were discussed with this patient, including applicable screening as appropriate for fall prevention, nutrition, physical activity, Tobacco-use cessation, weight loss and cognition.  Checklist reviewing preventive services available has been given to the patient.  Reviewed patient's diet, addressing concerns and/or questions.       Patient Instructions   Lab in [address]    Ortho referral - podiatry   Jackie Maurice MD     Medication refilled     Muna Romero is a 69 year old, presenting for the following:  Medicare Visit (fasting)        7/9/2024     9:00 AM   Additional Questions   Roomed by Rosie AMEZQUITA         Health Care Directive  Patient does not have a Health Care Directive or Living Will: Patient states has Advance Directive and will bring in a copy to clinic.    HPI  Fasting   Blood pressure in good range   Echo stable     Mammogram 6/2024  Colon 11/2023- repeat 5 years     Declined pneumonia shot     [place] - would see podiatry         7/9/2024   General Health   How would you rate your overall physical health? Good   Feel stress (tense, anxious, or unable to sleep) Not at all            7/9/2024   Nutrition   Diet: Regular (no restrictions)            7/9/2024   Exercise   Days per week of moderate/strenous exercise 4 days   Average minutes spent exercising at this level 20 min            7/9/2024   Social Factors   Frequency of gathering with friends or relatives Once a week            5/25/2023   Activities of Daily Living- Home Safety   Needs help with the following " daily activites NO assistance is needed   Safety concerns in the home None of the above            7/9/2024   Dental   Dentist two times every year? Yes            5/25/2023   Hearing Screening   Hearing concerns? No concerns               No data to display                   Today's PHQ-9 Score:       7/9/2024     8:55 AM   PHQ-9 SCORE   PHQ-9 Total Score MyChart 0   PHQ-9 Total Score 0         7/9/2024   Substance Use   Alcohol more than 3/day or more than 7/wk Not Applicable   Do you have a current opioid prescription? No        Social History     Tobacco Use    Smoking status: Former     Types: Cigarettes    Smokeless tobacco: Never    Tobacco comments:     quit 20 years ago   Vaping Use    Vaping status: Never Used   Substance Use Topics    Alcohol use: Yes     Comment: wine every week    Drug use: No           6/14/2024   LAST FHS-7 RESULTS   1st degree relative breast or ovarian cancer No   Any relative bilateral breast cancer No   Any male have breast cancer No   Any ONE woman have BOTH breast AND ovarian cancer No   Any woman with breast cancer before 50yrs No   2 or more relatives with breast AND/OR ovarian cancer No   2 or more relatives with breast AND/OR bowel cancer No                 History of abnormal Pap smear:         Latest Ref Rng & Units 7/11/2016    11:47 AM 7/11/2016     8:40 AM 7/11/2016    12:00 AM   PAP / HPV   PAP (Historical)    NIL    HPV 16 DNA NEG Negative  Duplicate request     HPV 18 DNA NEG Negative  Duplicate request     Other HR HPV NEG Negative  Duplicate request       ASCVD Risk   The 10-year ASCVD risk score (Mookie COLLAZO, et al., 2019) is: 8.1%    Values used to calculate the score:      Age: 69 years      Sex: Female      Is Non- : No      Diabetic: No      Tobacco smoker: No      Systolic Blood Pressure: 136 mmHg      Is BP treated: Yes      HDL Cholesterol: 85 mg/dL      Total Cholesterol: 156 mg/dL            Reviewed and updated as needed  "this visit by Provider                    Lab work is in process  Current providers sharing in care for this patient include:  Patient Care Team:  Lily Hess APRN CNP as PCP - General (Internal Medicine)  Rich Palmer MD as MD (Cardiovascular & Thoracic Surgery)  Geneva Maurer PA-C as Assigned Heart and Vascular Provider  Melissa Trinidad MD as Assigned PCP  Lily Hess APRN CNP as Nurse Practitioner (Internal Medicine)    The following health maintenance items are reviewed in Epic and correct as of today:  Health Maintenance   Topic Date Due    Pneumococcal Vaccine: 65+ Years (1 of 2 - PCV) Never done    ZOSTER IMMUNIZATION (1 of 2) Never done    RSV VACCINE (Pregnancy & 60+) (1 - 1-dose 60+ series) Never done    COVID-19 Vaccine (3 - 2023-24 season) 09/01/2023    MEDICARE ANNUAL WELLNESS VISIT  05/26/2024    LIPID  05/26/2024    MICROALBUMIN  05/26/2024    INFLUENZA VACCINE (1) 09/01/2024    MAMMO SCREENING  06/14/2025    ANNUAL REVIEW OF HM ORDERS  07/09/2025    FALL RISK ASSESSMENT  07/09/2025    DTAP/TDAP/TD IMMUNIZATION (3 - Td or Tdap) 12/14/2025    GLUCOSE  06/14/2026    COLORECTAL CANCER SCREENING  11/06/2028    ADVANCE CARE PLANNING  07/09/2029    DEXA  07/13/2038    HEPATITIS C SCREENING  Completed    PHQ-2 (once per calendar year)  Completed    IPV IMMUNIZATION  Aged Out    HPV IMMUNIZATION  Aged Out    MENINGITIS IMMUNIZATION  Aged Out    RSV MONOCLONAL ANTIBODY  Aged Out    PAP  Discontinued    LUNG CANCER SCREENING  Discontinued         Review of Systems  Constitutional, neuro, ENT, endocrine, pulmonary, cardiac, gastrointestinal, genitourinary, musculoskeletal, integument and psychiatric systems are negative, except as otherwise noted.     Objective    Exam  /74   Pulse 76   Temp 97.7  F (36.5  C) (Tympanic)   Resp 18   Ht 1.715 m (5' 7.5\")   Wt 98.9 kg (218 lb)   LMP 07/07/2008   SpO2 98%   BMI 33.64 kg/m     Estimated body mass index is 33.64 " "kg/m  as calculated from the following:    Height as of this encounter: 1.715 m (5' 7.5\").    Weight as of this encounter: 98.9 kg (218 lb).    Physical Exam  GENERAL: alert and no distress  EYES: Eyes grossly normal to inspection,  and conjunctivae and sclerae normal  HENT: ear canals and TM's normal, nose and mouth without ulcers or lesions  NECK: no adenopathy, no asymmetry, masses, or scars  RESP: lungs clear to auscultation - no rales, rhonchi or wheezes  CV: regular rate and rhythm, systolic murmur   ABDOMEN: soft, nontender, no hepatosplenomegaly, no masses and bowel sounds normal  MS: no gross musculoskeletal defects noted, no edema  SKIN: no suspicious lesions or rashes  NEURO: Normal strength and tone, mentation intact and speech normal  PSYCH: mentation appears normal, affect normal/bright         7/9/2024   Mini Cog   Clock Draw Score 2 Normal   3 Item Recall 3 objects recalled   Mini Cog Total Score 5                 Signed Electronically by: KOBI Hooks CNP    "

## 2024-07-10 LAB
ALBUMIN SERPL BCG-MCNC: 4.3 G/DL (ref 3.5–5.2)
ALP SERPL-CCNC: 98 U/L (ref 40–150)
ALT SERPL W P-5'-P-CCNC: 25 U/L (ref 0–50)
ANION GAP SERPL CALCULATED.3IONS-SCNC: 8 MMOL/L (ref 7–15)
AST SERPL W P-5'-P-CCNC: 30 U/L (ref 0–45)
BILIRUB SERPL-MCNC: 0.6 MG/DL
BUN SERPL-MCNC: 21.3 MG/DL (ref 8–23)
CALCIUM SERPL-MCNC: 10 MG/DL (ref 8.8–10.2)
CHLORIDE SERPL-SCNC: 104 MMOL/L (ref 98–107)
CHOLEST SERPL-MCNC: 155 MG/DL
CREAT SERPL-MCNC: 0.82 MG/DL (ref 0.51–0.95)
CREAT UR-MCNC: 69.5 MG/DL
DEPRECATED HCO3 PLAS-SCNC: 28 MMOL/L (ref 22–29)
EGFRCR SERPLBLD CKD-EPI 2021: 78 ML/MIN/1.73M2
FASTING STATUS PATIENT QL REPORTED: YES
FASTING STATUS PATIENT QL REPORTED: YES
GLUCOSE SERPL-MCNC: 94 MG/DL (ref 70–99)
HDLC SERPL-MCNC: 80 MG/DL
LDLC SERPL CALC-MCNC: 63 MG/DL
MICROALBUMIN UR-MCNC: <12 MG/L
MICROALBUMIN/CREAT UR: NORMAL MG/G{CREAT}
NONHDLC SERPL-MCNC: 75 MG/DL
POTASSIUM SERPL-SCNC: 4.5 MMOL/L (ref 3.4–5.3)
PROT SERPL-MCNC: 7 G/DL (ref 6.4–8.3)
SODIUM SERPL-SCNC: 140 MMOL/L (ref 135–145)
TRIGL SERPL-MCNC: 60 MG/DL
TSH SERPL DL<=0.005 MIU/L-ACNC: 3.2 UIU/ML (ref 0.3–4.2)

## 2024-08-01 ENCOUNTER — OFFICE VISIT (OUTPATIENT)
Dept: CARDIOLOGY | Facility: CLINIC | Age: 69
End: 2024-08-01
Payer: COMMERCIAL

## 2024-08-01 VITALS
HEART RATE: 63 BPM | DIASTOLIC BLOOD PRESSURE: 71 MMHG | WEIGHT: 220 LBS | OXYGEN SATURATION: 99 % | SYSTOLIC BLOOD PRESSURE: 129 MMHG | HEIGHT: 68 IN | BODY MASS INDEX: 33.34 KG/M2

## 2024-08-01 DIAGNOSIS — Z95.2 S/P TAVR (TRANSCATHETER AORTIC VALVE REPLACEMENT): Primary | ICD-10-CM

## 2024-08-01 DIAGNOSIS — E78.00 HYPERCHOLESTEREMIA: ICD-10-CM

## 2024-08-01 DIAGNOSIS — I10 ESSENTIAL HYPERTENSION, BENIGN: ICD-10-CM

## 2024-08-01 DIAGNOSIS — I25.10 CORONARY ARTERY DISEASE INVOLVING NATIVE CORONARY ARTERY OF NATIVE HEART WITHOUT ANGINA PECTORIS: ICD-10-CM

## 2024-08-01 PROCEDURE — 99215 OFFICE O/P EST HI 40 MIN: CPT | Performed by: PHYSICIAN ASSISTANT

## 2024-08-01 PROCEDURE — G2211 COMPLEX E/M VISIT ADD ON: HCPCS | Performed by: PHYSICIAN ASSISTANT

## 2024-08-01 NOTE — LETTER
8/1/2024    Lily Hess, APRN CNP  [address] [facility] [address]    RE: Heather Bauer       Dear Colleague,     I had the pleasure of seeing Heather Bauer in the [facility].  Primary Cardiologist: Dr. Dave    Reason for Visit: Annual follow-up with repeat labs and echocardiogram    History of Present Illness:   Heather is a pleasant 69 year old female with past medical history notable for history of severe symptomatic aortic stenosis (s/p TAVR, received 23 mm ES3 valve in 6/2022 with no complications), CAD (history of PCI to LAD in 2019), hypertension, and hyperlipidemia.     Heather returns to clinic today stating she feels well.  She denies chest pain, shortness of breath, orthopnea, PND, or peripheral edema.  She denies any bleeding issues.    Assessment and Plan:  Heather is a pleasant 69 year old female with past medical history notable for history of severe symptomatic aortic stenosis (s/p TAVR, received 23 mm ES3 valve in 6/2022 with no complications), CAD (history of PCI to LAD in 2019), hypertension, and hyperlipidemia.     Heather feels well overall from a cardiovascular standpoint.  I reviewed her echocardiogram which showed normal bioprosthetic aortic valve function.  Her labs also show no abnormalities with her blood counts or renal function.  Her electrolytes are within normal limits.  She has no concerning symptoms.  I have asked her to return in 1 year.    40 minutes spent on the date of the encounter with chart review, patient visit, care coordination, and documentation.    The longitudinal plan of care for the severe aortic stenosis, hypertension, coronary artery disease, and hyperlipidemia as documented were addressed during this visit. Due to the added complexity in care, I will continue to support Heather KEE Bauer in the subsequent management and with ongoing continuity of care.     This note was completed in part using Dragon voice recognition  "software. Although reviewed after completion, some word and grammatical errors may occur.    Orders this Visit:  No orders of the defined types were placed in this encounter.    No orders of the defined types were placed in this encounter.    There are no discontinued medications.      No diagnosis found.    CURRENT MEDICATIONS:  Current Outpatient Medications   Medication Sig Dispense Refill    amoxicillin (AMOXIL) 500 MG capsule 4 capsules one hour before dental procedure 8 capsule 3    aspirin 81 MG EC tablet Take 81 mg by mouth daily      atorvastatin (LIPITOR) 40 MG tablet Take 1 tablet (40 mg) by mouth daily 90 tablet 3    lisinopril (ZESTRIL) 5 MG tablet Take 1 tablet (5 mg) by mouth daily 90 tablet 3    metoprolol succinate ER (TOPROL XL) 25 MG 24 hr tablet Take 0.5 tablets (12.5 mg) by mouth daily 45 tablet 3    MULTIPLE MINERALS-VITAMINS OR TABS Take 1 tablet by mouth daily      nitroGLYcerin (NITROSTAT) 0.4 MG sublingual tablet One tablet under the tongue every 5 minutes if needed for chest pain. May repeat every 5 minutes for a maximum of 3 doses in 15 minutes\" 25 tablet 3       ALLERGIES     Allergies   Allergen Reactions    No Known Drug Allergy        PAST MEDICAL HISTORY:  Past Medical History:   Diagnosis Date    Aortic valve stenosis     Arthritis     Essential hypertension, benign     Fracture, tibia, shaft     History of colposcopy with cervical biopsy 02/22/2011    AMRITA I    Papanicolaou smear of vagina with atypical squamous cells cannot exclude high grade squamous intraepithelial lesion (ASC-H) 01/31/2011    Uncomplicated asthma        PAST SURGICAL HISTORY:  Past Surgical History:   Procedure Laterality Date    COLONOSCOPY N/A 11/6/2023    Procedure: Colonoscopy with polypectomies using hot snare;  Surgeon: Cathy Barreto MD;  Location:  GI    CV CORONARY ANGIOGRAM N/A 3/11/2019    Procedure: Coronary Angiogram;  Surgeon: Adolph Dave MD;  Location: [facility] CARDIAC CATH LAB    CV " CORONARY ANGIOGRAM N/A 5/9/2022    Procedure: Coronary Angiogram;  Surgeon: Madonna Garay MD;  Location:  HEART CARDIAC CATH LAB    CV PCI N/A 5/9/2022    Procedure: Percutaneous Coronary Intervention;  Surgeon: Madonna Garay MD;  Location:  HEART CARDIAC CATH LAB    CV PCI ANGIOPLASTY N/A 3/11/2019    Procedure: Percutaneous Coronary Intervention;  Surgeon: Adolph Dave MD;  Location:  HEART CARDIAC CATH LAB    CV TRANSCATHETER AORTIC VALVE REPLACEMENT-FEMORAL APPROACH N/A 5/31/2022    Procedure: Transcatheter Aortic Valve Replacement-Femoral Approach;  Surgeon: Madonna Garay MD;  Location:  HEART CARDIAC CATH LAB    ZZC APPENDECTOMY         FAMILY HISTORY:  Family History   Problem Relation Age of Onset    C.A.D. Mother         early 60's    Lipids Mother     Heart Disease Mother 68        heart bypass surgery    Hypertension Mother     Thyroid Disease Mother     Heart Disease Father         valvular disease    Heart Disease Maternal Grandmother         heart attack    Thyroid Disease Paternal Aunt     Colon Cancer No family hx of        SOCIAL HISTORY:  Social History     Socioeconomic History    Marital status:      Spouse name: None    Number of children: 2    Years of education: None    Highest education level: None   Occupational History     Employer: [employer]   Tobacco Use    Smoking status: Former     Types: Cigarettes    Smokeless tobacco: Never    Tobacco comments:     quit 20 years ago   Vaping Use    Vaping status: Never Used   Substance and Sexual Activity    Alcohol use: Yes     Comment: wine every week    Drug use: No    Sexual activity: Yes     Birth control/protection: Condom   Other Topics Concern    Exercise Yes    Seat Belt Yes     Social Determinants of Health     Financial Resource Strain: Low Risk  (7/9/2024)    Financial Resource Strain     Within the past 12 months, have you or your family members you live with been unable to get utilities (heat,  electricity) when it was really needed?: No   Food Insecurity: Low Risk  (7/9/2024)    Food Insecurity     Within the past 12 months, did you worry that your food would run out before you got money to buy more?: No     Within the past 12 months, did the food you bought just not last and you didn t have money to get more?: No   Transportation Needs: Low Risk  (7/9/2024)    Transportation Needs     Within the past 12 months, has lack of transportation kept you from medical appointments, getting your medicines, non-medical meetings or appointments, work, or from getting things that you need?: No   Physical Activity: Insufficiently Active (7/9/2024)    Exercise Vital Sign     Days of Exercise per Week: 4 days     Minutes of Exercise per Session: 20 min   Stress: No Stress Concern Present (7/9/2024)    [employer] [place] [employer] - Occupational Stress Questionnaire     Feeling of Stress : Not at all   Social Connections: Unknown (7/9/2024)    Social Connection and Isolation Panel [NHANES]     Frequency of Social Gatherings with Friends and Family: Once a week   Interpersonal Safety: Low Risk  (7/9/2024)    Interpersonal Safety     Do you feel physically and emotionally safe where you currently live?: Yes     Within the past 12 months, have you been hit, slapped, kicked or otherwise physically hurt by someone?: No     Within the past 12 months, have you been humiliated or emotionally abused in other ways by your partner or ex-partner?: No   Housing Stability: Low Risk  (7/9/2024)    Housing Stability     Do you have housing? : Yes     Are you worried about losing your housing?: No       Review of Systems:  Skin:  not assessed     Eyes:  Positive for glasses  ENT:  not assessed    Respiratory:  Positive for dyspnea on exertion  Cardiovascular:  Negative    Gastroenterology: not assessed    Genitourinary:  not assessed    Musculoskeletal:  not assessed    Neurologic:  not assessed    Psychiatric:  not assessed   "  Heme/Lymph/Imm:  not assessed    Endocrine:  not assessed      Physical Exam:  Vitals: /71 (BP Location: Right arm, Patient Position: Sitting, Cuff Size: Adult Large)   Pulse 63   Ht 1.715 m (5' 7.5\")   Wt 99.8 kg (220 lb)   LMP 07/07/2008   SpO2 99%   BMI 33.95 kg/m       GEN:  NAD  NECK: No JVD  C/V:  Regular rate and rhythm, no murmur, rub or gallop.  RESP: Clear to auscultation bilaterally without wheezing, rales, or rhonchi.  GI: Abdomen soft, nontender, nondistended. No HSM appreciated.   EXTREM: No pitting LE edema.   NEURO: Alert and oriented, cooperative. No obvious focal deficits.   PSYCH: Normal affect.  SKIN: Warm and dry.       Recent Lab Results:  LIPID RESULTS:  Lab Results   Component Value Date    CHOL 155 07/09/2024    CHOL 135 01/21/2021    HDL 80 07/09/2024    HDL 75 01/21/2021    LDL 63 07/09/2024    LDL 48 01/21/2021    TRIG 60 07/09/2024    TRIG 59 01/21/2021    CHOLHDLRATIO 3.3 01/31/2011       LIVER ENZYME RESULTS:  Lab Results   Component Value Date    AST 30 07/09/2024    AST 18 03/11/2019    ALT 25 07/09/2024    ALT 34 06/19/2019       CBC RESULTS:  Lab Results   Component Value Date    WBC 5.4 07/09/2024    WBC 6.0 03/11/2019    RBC 4.31 07/09/2024    RBC 4.43 03/11/2019    HGB 14.2 07/09/2024    HGB 14.4 03/11/2019    HCT 41.6 07/09/2024    HCT 42.0 03/11/2019    MCV 97 07/09/2024    MCV 95 03/11/2019    MCH 32.9 07/09/2024    MCH 32.5 03/11/2019    MCHC 34.1 07/09/2024    MCHC 34.3 03/11/2019    RDW 12.1 07/09/2024    RDW 12.4 03/11/2019     07/09/2024     03/11/2019       BMP RESULTS:  Lab Results   Component Value Date     07/09/2024     01/21/2021    POTASSIUM 4.5 07/09/2024    POTASSIUM 4.3 07/07/2022    POTASSIUM 4.2 01/21/2021    CHLORIDE 104 07/09/2024    CHLORIDE 107 07/07/2022    CHLORIDE 106 01/21/2021    CO2 28 07/09/2024    CO2 29 07/07/2022    CO2 33 (H) 01/21/2021    ANIONGAP 8 07/09/2024    ANIONGAP 2 (L) 07/07/2022    ANIONGAP 1 " (L) 01/21/2021    GLC 94 07/09/2024    GLC 90 07/07/2022    GLC 95 01/21/2021    BUN 21.3 07/09/2024    BUN 19 07/07/2022    BUN 19 01/21/2021    CR 0.82 07/09/2024    CR 0.82 01/21/2021    GFRESTIMATED 78 07/09/2024    GFRESTIMATED 76 01/21/2021    GFRESTBLACK 88 01/21/2021    RALPH 10.0 07/09/2024    RALPH 9.7 01/21/2021        A1C RESULTS:  Lab Results   Component Value Date    A1C 5.7 (H) 03/11/2019       INR RESULTS:  Lab Results   Component Value Date    INR 1.06 05/26/2022    INR 1.05 05/09/2022    INR 0.96 03/11/2019             Geneva Maurer PA-C  August 1, 2024       Thank you for allowing me to participate in the care of your patient.      Sincerely,     Geneva Maurer PA-C     [facility]  cc:   Geneva Maurer PA-C  [address] [place] [address]

## 2024-08-01 NOTE — PROGRESS NOTES
Primary Cardiologist: Dr. Dave    Reason for Visit: Annual follow-up with repeat labs and echocardiogram    History of Present Illness:   Heather is a pleasant 69 year old female with past medical history notable for history of severe symptomatic aortic stenosis (s/p TAVR, received 23 mm ES3 valve in 6/2022 with no complications), CAD (history of PCI to LAD in 2019), hypertension, and hyperlipidemia.     Heather returns to clinic today stating she feels well.  She denies chest pain, shortness of breath, orthopnea, PND, or peripheral edema.  She denies any bleeding issues.    Assessment and Plan:  Heather is a pleasant 69 year old female with past medical history notable for history of severe symptomatic aortic stenosis (s/p TAVR, received 23 mm ES3 valve in 6/2022 with no complications), CAD (history of PCI to LAD in 2019), hypertension, and hyperlipidemia.     Heather feels well overall from a cardiovascular standpoint.  I reviewed her echocardiogram which showed normal bioprosthetic aortic valve function.  Her labs also show no abnormalities with her blood counts or renal function.  Her electrolytes are within normal limits.  She has no concerning symptoms.  I have asked her to return in 1 year.    40 minutes spent on the date of the encounter with chart review, patient visit, care coordination, and documentation.    The longitudinal plan of care for the severe aortic stenosis, hypertension, coronary artery disease, and hyperlipidemia as documented were addressed during this visit. Due to the added complexity in care, I will continue to support Heather Bauer in the subsequent management and with ongoing continuity of care.     This note was completed in part using Dragon voice recognition software. Although reviewed after completion, some word and grammatical errors may occur.    Orders this Visit:  No orders of the defined types were placed in this encounter.    No orders of the defined types were placed in  "this encounter.    There are no discontinued medications.      No diagnosis found.    CURRENT MEDICATIONS:  Current Outpatient Medications   Medication Sig Dispense Refill    amoxicillin (AMOXIL) 500 MG capsule 4 capsules one hour before dental procedure 8 capsule 3    aspirin 81 MG EC tablet Take 81 mg by mouth daily      atorvastatin (LIPITOR) 40 MG tablet Take 1 tablet (40 mg) by mouth daily 90 tablet 3    lisinopril (ZESTRIL) 5 MG tablet Take 1 tablet (5 mg) by mouth daily 90 tablet 3    metoprolol succinate ER (TOPROL XL) 25 MG 24 hr tablet Take 0.5 tablets (12.5 mg) by mouth daily 45 tablet 3    MULTIPLE MINERALS-VITAMINS OR TABS Take 1 tablet by mouth daily      nitroGLYcerin (NITROSTAT) 0.4 MG sublingual tablet One tablet under the tongue every 5 minutes if needed for chest pain. May repeat every 5 minutes for a maximum of 3 doses in 15 minutes\" 25 tablet 3       ALLERGIES     Allergies   Allergen Reactions    No Known Drug Allergy        PAST MEDICAL HISTORY:  Past Medical History:   Diagnosis Date    Aortic valve stenosis     Arthritis     Essential hypertension, benign     Fracture, tibia, shaft     History of colposcopy with cervical biopsy 02/22/2011    AMRITA I    Papanicolaou smear of vagina with atypical squamous cells cannot exclude high grade squamous intraepithelial lesion (ASC-H) 01/31/2011    Uncomplicated asthma        PAST SURGICAL HISTORY:  Past Surgical History:   Procedure Laterality Date    COLONOSCOPY N/A 11/6/2023    Procedure: Colonoscopy with polypectomies using hot snare;  Surgeon: Cathy Barreto MD;  Location:  GI    CV CORONARY ANGIOGRAM N/A 3/11/2019    Procedure: Coronary Angiogram;  Surgeon: Adolph Dave MD;  Location: [facility] CARDIAC CATH LAB    CV CORONARY ANGIOGRAM N/A 5/9/2022    Procedure: Coronary Angiogram;  Surgeon: Madonna Garay MD;  Location: [facility] CARDIAC CATH LAB    CV PCI N/A 5/9/2022    Procedure: Percutaneous Coronary Intervention;  Surgeon: " Madonna Garay MD;  Location:  HEART CARDIAC CATH LAB    CV PCI ANGIOPLASTY N/A 3/11/2019    Procedure: Percutaneous Coronary Intervention;  Surgeon: Adolph Dave MD;  Location:  HEART CARDIAC CATH LAB    CV TRANSCATHETER AORTIC VALVE REPLACEMENT-FEMORAL APPROACH N/A 5/31/2022    Procedure: Transcatheter Aortic Valve Replacement-Femoral Approach;  Surgeon: Madonna Garay MD;  Location:  HEART CARDIAC CATH LAB    ZZC APPENDECTOMY         FAMILY HISTORY:  Family History   Problem Relation Age of Onset    C.A.D. Mother         early 60's    Lipids Mother     Heart Disease Mother 68        heart bypass surgery    Hypertension Mother     Thyroid Disease Mother     Heart Disease Father         valvular disease    Heart Disease Maternal Grandmother         heart attack    Thyroid Disease Paternal Aunt     Colon Cancer No family hx of        SOCIAL HISTORY:  Social History     Socioeconomic History    Marital status:      Spouse name: None    Number of children: 2    Years of education: None    Highest education level: None   Occupational History     Employer: [employer]   Tobacco Use    Smoking status: Former     Types: Cigarettes    Smokeless tobacco: Never    Tobacco comments:     quit 20 years ago   Vaping Use    Vaping status: Never Used   Substance and Sexual Activity    Alcohol use: Yes     Comment: wine every week    Drug use: No    Sexual activity: Yes     Birth control/protection: Condom   Other Topics Concern    Exercise Yes    Seat Belt Yes     Social Determinants of Health     Financial Resource Strain: Low Risk  (7/9/2024)    Financial Resource Strain     Within the past 12 months, have you or your family members you live with been unable to get utilities (heat, electricity) when it was really needed?: No   Food Insecurity: Low Risk  (7/9/2024)    Food Insecurity     Within the past 12 months, did you worry that your food would run out before you got money to buy more?: No     Within  "the past 12 months, did the food you bought just not last and you didn t have money to get more?: No   Transportation Needs: Low Risk  (7/9/2024)    Transportation Needs     Within the past 12 months, has lack of transportation kept you from medical appointments, getting your medicines, non-medical meetings or appointments, work, or from getting things that you need?: No   Physical Activity: Insufficiently Active (7/9/2024)    Exercise Vital Sign     Days of Exercise per Week: 4 days     Minutes of Exercise per Session: 20 min   Stress: No Stress Concern Present (7/9/2024)    [employer] - Occupational Stress Questionnaire     Feeling of Stress : Not at all   Social Connections: Unknown (7/9/2024)    Social Connection and Isolation Panel [NHANES]     Frequency of Social Gatherings with Friends and Family: Once a week   Interpersonal Safety: Low Risk  (7/9/2024)    Interpersonal Safety     Do you feel physically and emotionally safe where you currently live?: Yes     Within the past 12 months, have you been hit, slapped, kicked or otherwise physically hurt by someone?: No     Within the past 12 months, have you been humiliated or emotionally abused in other ways by your partner or ex-partner?: No   Housing Stability: Low Risk  (7/9/2024)    Housing Stability     Do you have housing? : Yes     Are you worried about losing your housing?: No       Review of Systems:  Skin:  not assessed     Eyes:  Positive for glasses  ENT:  not assessed    Respiratory:  Positive for dyspnea on exertion  Cardiovascular:  Negative    Gastroenterology: not assessed    Genitourinary:  not assessed    Musculoskeletal:  not assessed    Neurologic:  not assessed    Psychiatric:  not assessed    Heme/Lymph/Imm:  not assessed    Endocrine:  not assessed      Physical Exam:  Vitals: /71 (BP Location: Right arm, Patient Position: Sitting, Cuff Size: Adult Large)   Pulse 63   Ht 1.715 m (5' 7.5\")   Wt 99.8 kg " (220 lb)   LMP 07/07/2008   SpO2 99%   BMI 33.95 kg/m       GEN:  NAD  NECK: No JVD  C/V:  Regular rate and rhythm, no murmur, rub or gallop.  RESP: Clear to auscultation bilaterally without wheezing, rales, or rhonchi.  GI: Abdomen soft, nontender, nondistended. No HSM appreciated.   EXTREM: No pitting LE edema.   NEURO: Alert and oriented, cooperative. No obvious focal deficits.   PSYCH: Normal affect.  SKIN: Warm and dry.       Recent Lab Results:  LIPID RESULTS:  Lab Results   Component Value Date    CHOL 155 07/09/2024    CHOL 135 01/21/2021    HDL 80 07/09/2024    HDL 75 01/21/2021    LDL 63 07/09/2024    LDL 48 01/21/2021    TRIG 60 07/09/2024    TRIG 59 01/21/2021    CHOLHDLRATIO 3.3 01/31/2011       LIVER ENZYME RESULTS:  Lab Results   Component Value Date    AST 30 07/09/2024    AST 18 03/11/2019    ALT 25 07/09/2024    ALT 34 06/19/2019       CBC RESULTS:  Lab Results   Component Value Date    WBC 5.4 07/09/2024    WBC 6.0 03/11/2019    RBC 4.31 07/09/2024    RBC 4.43 03/11/2019    HGB 14.2 07/09/2024    HGB 14.4 03/11/2019    HCT 41.6 07/09/2024    HCT 42.0 03/11/2019    MCV 97 07/09/2024    MCV 95 03/11/2019    MCH 32.9 07/09/2024    MCH 32.5 03/11/2019    MCHC 34.1 07/09/2024    MCHC 34.3 03/11/2019    RDW 12.1 07/09/2024    RDW 12.4 03/11/2019     07/09/2024     03/11/2019       BMP RESULTS:  Lab Results   Component Value Date     07/09/2024     01/21/2021    POTASSIUM 4.5 07/09/2024    POTASSIUM 4.3 07/07/2022    POTASSIUM 4.2 01/21/2021    CHLORIDE 104 07/09/2024    CHLORIDE 107 07/07/2022    CHLORIDE 106 01/21/2021    CO2 28 07/09/2024    CO2 29 07/07/2022    CO2 33 (H) 01/21/2021    ANIONGAP 8 07/09/2024    ANIONGAP 2 (L) 07/07/2022    ANIONGAP 1 (L) 01/21/2021    GLC 94 07/09/2024    GLC 90 07/07/2022    GLC 95 01/21/2021    BUN 21.3 07/09/2024    BUN 19 07/07/2022    BUN 19 01/21/2021    CR 0.82 07/09/2024    CR 0.82 01/21/2021    GFRESTIMATED 78 07/09/2024     GFRESTIMATED 76 01/21/2021    GFRESTBLACK 88 01/21/2021    RALPH 10.0 07/09/2024    RALPH 9.7 01/21/2021        A1C RESULTS:  Lab Results   Component Value Date    A1C 5.7 (H) 03/11/2019       INR RESULTS:  Lab Results   Component Value Date    INR 1.06 05/26/2022    INR 1.05 05/09/2022    INR 0.96 03/11/2019             Geneva Maurer PA-C  August 1, 2024

## 2025-03-25 ENCOUNTER — MYC MEDICAL ADVICE (OUTPATIENT)
Dept: CARDIOLOGY | Facility: CLINIC | Age: 70
End: 2025-03-25
Payer: COMMERCIAL

## 2025-05-15 ENCOUNTER — PATIENT OUTREACH (OUTPATIENT)
Dept: CARE COORDINATION | Facility: CLINIC | Age: 70
End: 2025-05-15
Payer: COMMERCIAL

## 2025-05-15 DIAGNOSIS — I10 BENIGN ESSENTIAL HYPERTENSION: ICD-10-CM

## 2025-05-15 RX ORDER — LISINOPRIL 5 MG/1
5 TABLET ORAL DAILY
Qty: 90 TABLET | Refills: 3 | OUTPATIENT
Start: 2025-05-15

## 2025-06-16 ENCOUNTER — HOSPITAL ENCOUNTER (OUTPATIENT)
Dept: MAMMOGRAPHY | Facility: CLINIC | Age: 70
Discharge: HOME | End: 2025-06-16
Attending: NURSE PRACTITIONER | Admitting: NURSE PRACTITIONER
Payer: COMMERCIAL

## 2025-06-16 DIAGNOSIS — Z12.31 VISIT FOR SCREENING MAMMOGRAM: ICD-10-CM

## 2025-06-16 PROCEDURE — 77063 BREAST TOMOSYNTHESIS BI: CPT

## 2025-07-09 DIAGNOSIS — I10 BENIGN ESSENTIAL HYPERTENSION: ICD-10-CM

## 2025-07-09 RX ORDER — LISINOPRIL 5 MG/1
5 TABLET ORAL DAILY
Qty: 90 TABLET | Refills: 0 | Status: SHIPPED | OUTPATIENT
Start: 2025-07-09 | End: 2025-07-10

## 2025-07-09 SDOH — HEALTH STABILITY: PHYSICAL HEALTH: ON AVERAGE, HOW MANY DAYS PER WEEK DO YOU ENGAGE IN MODERATE TO STRENUOUS EXERCISE (LIKE A BRISK WALK)?: 3 DAYS

## 2025-07-09 SDOH — HEALTH STABILITY: PHYSICAL HEALTH: ON AVERAGE, HOW MANY MINUTES DO YOU ENGAGE IN EXERCISE AT THIS LEVEL?: 30 MIN

## 2025-07-09 ASSESSMENT — ANXIETY QUESTIONNAIRES
1. FEELING NERVOUS, ANXIOUS, OR ON EDGE: NOT AT ALL
5. BEING SO RESTLESS THAT IT IS HARD TO SIT STILL: NOT AT ALL
7. FEELING AFRAID AS IF SOMETHING AWFUL MIGHT HAPPEN: NOT AT ALL
GAD7 TOTAL SCORE: 0
6. BECOMING EASILY ANNOYED OR IRRITABLE: NOT AT ALL
3. WORRYING TOO MUCH ABOUT DIFFERENT THINGS: NOT AT ALL
GAD7 TOTAL SCORE: 0
4. TROUBLE RELAXING: NOT AT ALL
7. FEELING AFRAID AS IF SOMETHING AWFUL MIGHT HAPPEN: NOT AT ALL
GAD7 TOTAL SCORE: 0
2. NOT BEING ABLE TO STOP OR CONTROL WORRYING: NOT AT ALL

## 2025-07-09 ASSESSMENT — PATIENT HEALTH QUESTIONNAIRE - PHQ9
SUM OF ALL RESPONSES TO PHQ QUESTIONS 1-9: 0
SUM OF ALL RESPONSES TO PHQ QUESTIONS 1-9: 0

## 2025-07-10 ENCOUNTER — OFFICE VISIT (OUTPATIENT)
Dept: INTERNAL MEDICINE | Facility: CLINIC | Age: 70
End: 2025-07-10
Payer: COMMERCIAL

## 2025-07-10 VITALS
RESPIRATION RATE: 16 BRPM | HEART RATE: 65 BPM | OXYGEN SATURATION: 100 % | BODY MASS INDEX: 33.19 KG/M2 | WEIGHT: 219 LBS | DIASTOLIC BLOOD PRESSURE: 70 MMHG | TEMPERATURE: 97.3 F | SYSTOLIC BLOOD PRESSURE: 126 MMHG | HEIGHT: 68 IN

## 2025-07-10 DIAGNOSIS — I10 BENIGN ESSENTIAL HYPERTENSION: ICD-10-CM

## 2025-07-10 DIAGNOSIS — I25.10 CORONARY ARTERY DISEASE INVOLVING NATIVE CORONARY ARTERY OF NATIVE HEART WITHOUT ANGINA PECTORIS: ICD-10-CM

## 2025-07-10 DIAGNOSIS — I25.118 CORONARY ARTERY DISEASE OF NATIVE ARTERY OF NATIVE HEART WITH STABLE ANGINA PECTORIS: ICD-10-CM

## 2025-07-10 DIAGNOSIS — Z11.51 SPECIAL SCREENING EXAMINATION FOR HUMAN PAPILLOMAVIRUS (HPV): ICD-10-CM

## 2025-07-10 DIAGNOSIS — Z87.42 HISTORY OF ABNORMAL CERVICAL PAP SMEAR: ICD-10-CM

## 2025-07-10 DIAGNOSIS — Z00.00 ROUTINE GENERAL MEDICAL EXAMINATION AT A HEALTH CARE FACILITY: Primary | ICD-10-CM

## 2025-07-10 DIAGNOSIS — Z98.61 POSTSURGICAL PERCUTANEOUS TRANSLUMINAL CORONARY ANGIOPLASTY STATUS: ICD-10-CM

## 2025-07-10 DIAGNOSIS — Z95.2 AORTIC VALVE REPLACED: ICD-10-CM

## 2025-07-10 DIAGNOSIS — E78.5 HYPERLIPIDEMIA LDL GOAL <70: ICD-10-CM

## 2025-07-10 PROBLEM — I27.20 PULMONARY HYPERTENSION, UNSPECIFIED (H): Status: RESOLVED | Noted: 2023-05-26 | Resolved: 2025-07-10

## 2025-07-10 LAB
BASOPHILS # BLD AUTO: 0.1 10E3/UL (ref 0–0.2)
BASOPHILS NFR BLD AUTO: 2 %
EOSINOPHIL # BLD AUTO: 0.5 10E3/UL (ref 0–0.7)
EOSINOPHIL NFR BLD AUTO: 9 %
ERYTHROCYTE [DISTWIDTH] IN BLOOD BY AUTOMATED COUNT: 12.5 % (ref 10–15)
HCT VFR BLD AUTO: 42.4 % (ref 35–47)
HGB BLD-MCNC: 14.7 G/DL (ref 11.7–15.7)
IMM GRANULOCYTES # BLD: 0 10E3/UL
IMM GRANULOCYTES NFR BLD: 0 %
LYMPHOCYTES # BLD AUTO: 1.5 10E3/UL (ref 0.8–5.3)
LYMPHOCYTES NFR BLD AUTO: 27 %
MCH RBC QN AUTO: 33 PG (ref 26.5–33)
MCHC RBC AUTO-ENTMCNC: 34.7 G/DL (ref 31.5–36.5)
MCV RBC AUTO: 95 FL (ref 78–100)
MONOCYTES # BLD AUTO: 0.4 10E3/UL (ref 0–1.3)
MONOCYTES NFR BLD AUTO: 6 %
NEUTROPHILS # BLD AUTO: 3.3 10E3/UL (ref 1.6–8.3)
NEUTROPHILS NFR BLD AUTO: 57 %
PLATELET # BLD AUTO: 258 10E3/UL (ref 150–450)
RBC # BLD AUTO: 4.45 10E6/UL (ref 3.8–5.2)
WBC # BLD AUTO: 5.7 10E3/UL (ref 4–11)

## 2025-07-10 RX ORDER — LISINOPRIL 5 MG/1
5 TABLET ORAL DAILY
Qty: 90 TABLET | Refills: 3 | Status: SHIPPED | OUTPATIENT
Start: 2025-07-10

## 2025-07-10 RX ORDER — ATORVASTATIN CALCIUM 40 MG/1
40 TABLET, FILM COATED ORAL DAILY
Qty: 90 TABLET | Refills: 3 | Status: SHIPPED | OUTPATIENT
Start: 2025-07-10

## 2025-07-10 RX ORDER — NITROGLYCERIN 0.4 MG/1
TABLET SUBLINGUAL
Qty: 25 TABLET | Refills: 3 | Status: SHIPPED | OUTPATIENT
Start: 2025-07-10

## 2025-07-10 RX ORDER — METOPROLOL SUCCINATE 25 MG/1
12.5 TABLET, EXTENDED RELEASE ORAL DAILY
Qty: 45 TABLET | Refills: 3 | Status: SHIPPED | OUTPATIENT
Start: 2025-07-10

## 2025-07-10 RX ORDER — AMOXICILLIN 500 MG/1
CAPSULE ORAL
Qty: 8 CAPSULE | Refills: 3 | Status: SHIPPED | OUTPATIENT
Start: 2025-07-10

## 2025-07-10 NOTE — PROGRESS NOTES
Preventive Care Visit  [facility]  KOBI Hooks CNP, Internal Medicine  Jul 10, 2025      Assessment & Plan     Routine general medical examination at a health care facility    - Lipid panel reflex to direct LDL Fasting; Future  - Comprehensive metabolic panel (BMP + Alb, Alk Phos, ALT, AST, Total. Bili, TP); Future  - TSH with free T4 reflex; Future  - CBC with platelets and differential; Future  - Lipid panel reflex to direct LDL Fasting  - Comprehensive metabolic panel (BMP + Alb, Alk Phos, ALT, AST, Total. Bili, TP)  - TSH with free T4 reflex  - CBC with platelets and differential    Benign essential hypertension  In good range   - Lipid panel reflex to direct LDL Fasting; Future  - Comprehensive metabolic panel (BMP + Alb, Alk Phos, ALT, AST, Total. Bili, TP); Future  - TSH with free T4 reflex; Future  - CBC with platelets and differential; Future  - Albumin Random Urine Quantitative with Creat Ratio; Future  - lisinopril (ZESTRIL) 5 MG tablet; Take 1 tablet (5 mg) by mouth daily.  - Lipid panel reflex to direct LDL Fasting  - Comprehensive metabolic panel (BMP + Alb, Alk Phos, ALT, AST, Total. Bili, TP)  - TSH with free T4 reflex  - CBC with platelets and differential  - Albumin Random Urine Quantitative with Creat Ratio    Aortic valve replaced  Followed by cardiology  - Comprehensive metabolic panel (BMP + Alb, Alk Phos, ALT, AST, Total. Bili, TP); Future  - TSH with free T4 reflex; Future  - CBC with platelets and differential; Future  - amoxicillin (AMOXIL) 500 MG capsule; 4 capsules one hour before dental procedure  - Comprehensive metabolic panel (BMP + Alb, Alk Phos, ALT, AST, Total. Bili, TP)  - TSH with free T4 reflex  - CBC with platelets and differential    Hyperlipidemia LDL goal <70    - Lipid panel reflex to direct LDL Fasting; Future  - Comprehensive metabolic panel (BMP + Alb, Alk Phos, ALT, AST, Total. Bili, TP); Future  - Lipid panel reflex to direct LDL  "Fasting  - Comprehensive metabolic panel (BMP + Alb, Alk Phos, ALT, AST, Total. Bili, TP)    Coronary artery disease involving native coronary artery of native heart without angina pectoris    - atorvastatin (LIPITOR) 40 MG tablet; Take 1 tablet (40 mg) by mouth daily.  - metoprolol succinate ER (TOPROL XL) 25 MG 24 hr tablet; Take 0.5 tablets (12.5 mg) by mouth daily.    Postsurgical percutaneous transluminal coronary angioplasty status    - nitroGLYcerin (NITROSTAT) 0.4 MG sublingual tablet; One tablet under the tongue every 5 minutes if needed for chest pain. May repeat every 5 minutes for a maximum of 3 doses in 15 minutes\"    Coronary artery disease of native artery of native heart with stable angina pectoris    - nitroGLYcerin (NITROSTAT) 0.4 MG sublingual tablet; One tablet under the tongue every 5 minutes if needed for chest pain. May repeat every 5 minutes for a maximum of 3 doses in 15 minutes\"    Special screening examination for human papillomavirus (HPV)    - HPV and Gynecologic Cytology Panel - Recommended Age 30-65 Years    History of abnormal cervical Pap smear    - HPV and Gynecologic Cytology Panel - Recommended Age 30-65 Years      BMI  Estimated body mass index is 33.79 kg/m  as calculated from the following:    Height as of this encounter: 1.715 m (5' 7.5\").    Weight as of this encounter: 99.3 kg (219 lb).   Weight management plan: Discussed healthy diet and exercise guidelines    Counseling  Appropriate preventive services were addressed with this patient via screening, questionnaire, or discussion as appropriate for fall prevention, nutrition, physical activity, Tobacco-use cessation, social engagement, weight loss and cognition.  Checklist reviewing preventive services available has been given to the patient.  Reviewed patient's diet, addressing concerns and/or questions.   She is at risk for lack of exercise and has been provided with information to increase physical activity for the " benefit of her well-being.   Reviewed preventive health counseling, as reflected in patient instructions       Regular exercise       Healthy diet/nutrition       Immunizations  Routine vaccine counseling provided.           Osteoporosis prevention/bone health       Colorectal Cancer Screening    Patient Instructions   Lab in [address]    Consider shingles shot and TDAP in pharmacy       Subjective   Heather is a 70 year old, presenting for the following:  Medicare Visit and Refill Request        7/10/2025     8:48 AM   Additional Questions   Roomed by SAMY Fang LPN   Accompanied by self         7/10/2025     8:48 AM   Patient Reported Additional Medications   Patient reports taking the following new medications none          HPI  Colon 2023- due 2028    Mammogram done     Fasting     Sees cardiology with echo in August     Pap abnormal 2011 colposcopy and normal since 2016      Advance Care Planning    Discussed advance care planning with patient; however, patient declined at this time.        7/9/2025   General Health   How would you rate your overall physical health? Good         7/9/2025   Nutrition   Diet: Regular (no restrictions)    Low salt       Multiple values from one day are sorted in reverse-chronological order         7/9/2025   Exercise   Days per week of moderate/strenous exercise 3 days   Average minutes spent exercising at this level 30 min         7/9/2025   Social Factors   Worry food won't last until get money to buy more No   Food not last or not have enough money for food? No   Do you have housing? (Housing is defined as stable permanent housing and does not include staying outside in a car, in a tent, in an abandoned building, in an overnight shelter, or couch-surfing.) Yes   Are you worried about losing your housing? No   Lack of transportation? No   Unable to get utilities (heat,electricity)? No         7/9/2025   Fall Risk   Fallen 2 or more times in the past year? No   Trouble with  walking or balance? No          7/9/2025   Activities of Daily Living- Home Safety   Needs help with the following daily activites None of the above   Safety concerns in the home None of the above         7/9/2025   Dental   Dentist two times every year? Yes         7/9/2025   Hearing Screening   Hearing concerns? None of the above         7/9/2025   Driving Risk Screening   Patient/family members have concerns about driving No         7/9/2025   General Alertness/Fatigue Screening   Have you been more tired than usual lately? No         7/9/2025   Urinary Incontinence Screening   Bothered by leaking urine in past 6 months No       Today's PHQ-9 Score:       7/9/2025     2:12 PM   PHQ-9 SCORE   PHQ-9 Total Score MyChart 0   PHQ-9 Total Score 0        Patient-reported         7/9/2025   Substance Use   Alcohol more than 3/day or more than 7/wk Not Applicable   Do you have a current opioid prescription? No   How severe/bad is pain from 1 to 10? 0/10 (No Pain)   Do you use any other substances recreationally? No     Social History     Tobacco Use    Smoking status: Former     Types: Cigarettes    Smokeless tobacco: Never    Tobacco comments:     quit 20 years ago   Vaping Use    Vaping status: Never Used   Substance Use Topics    Alcohol use: Yes     Comment: wine every week    Drug use: No           6/16/2025   LAST FHS-7 RESULTS   1st degree relative breast or ovarian cancer No              History of abnormal Pap smear: abnormal 2011 colposcopy and normal since 2011 2016        Latest Ref Rng & Units 7/11/2016    11:47 AM 7/11/2016     8:40 AM 7/11/2016    12:00 AM   PAP / HPV   PAP (Historical)    NIL    HPV 16 DNA NEG Negative  Duplicate request     HPV 18 DNA NEG Negative  Duplicate request     Other HR HPV NEG Negative  Duplicate request       ASCVD Risk   The 10-year ASCVD risk score (Mookie COLLAZO, et al., 2019) is: 8.1%    Values used to calculate the score:      Age: 70 years      Sex: Female      Is  Non- : No      Diabetic: No      Tobacco smoker: No      Systolic Blood Pressure: 126 mmHg      Is BP treated: Yes      HDL Cholesterol: 80 mg/dL      Total Cholesterol: 155 mg/dL            Reviewed and updated as needed this visit by Provider                    Lab work is in process  Current providers sharing in care for this patient include:  Patient Care Team:  Lily Hess APRN CNP as PCP - General (Internal Medicine)  Rich Palmer MD as MD (Cardiovascular & Thoracic Surgery)  Geneva Maurer PA-C as Assigned Heart and Vascular Provider  Lily Hess APRN CNP as Nurse Practitioner (Internal Medicine)  Lily Hess APRN CNP as Assigned PCP    The following health maintenance items are reviewed in Epic and correct as of today:  Health Maintenance   Topic Date Due    ZOSTER VACCINE (1 of 2) Never done    RSV VACCINE (1 - Risk 60-74 years 1-dose series) Never done    COVID-19 VACCINE (3 - 2024-25 season) 09/01/2024    BMP  07/09/2025    LIPID  07/09/2025    MICROALBUMIN  07/09/2025    ANNUAL REVIEW OF HM ORDERS  07/09/2025    MEDICARE ANNUAL WELLNESS VISIT  07/09/2025    INFLUENZA VACCINE (1) 09/01/2025    DTAP/TDAP/TD VACCINE (3 - Td or Tdap) 12/14/2025    MAMMO SCREENING  06/16/2026    FALL RISK ASSESSMENT  07/10/2026    DIABETES SCREENING  07/09/2027    COLORECTAL CANCER SCREENING  11/06/2028    ADVANCE CARE PLANNING  07/10/2030    DEXA  07/13/2038    HEPATITIS C SCREENING  Completed    PHQ-2 (once per calendar year)  Completed    PNEUMOCOCCAL VACCINE 50+ YEARS  Completed    HPV VACCINE  Aged Out    MENINGITIS VACCINE  Aged Out    PAP  Discontinued    LUNG CANCER SCREENING  Discontinued         Review of Systems  Constitutional, neuro, ENT, endocrine, pulmonary, cardiac, gastrointestinal, genitourinary, musculoskeletal, integument and psychiatric systems are negative, except as otherwise noted.     Objective    Exam  /70   Pulse 65    "Temp 97.3  F (36.3  C) (Oral)   Resp 16   Ht 1.715 m (5' 7.5\")   Wt 99.3 kg (219 lb)   LMP 07/07/2008   SpO2 100%   Breastfeeding No   BMI 33.79 kg/m     Estimated body mass index is 33.79 kg/m  as calculated from the following:    Height as of this encounter: 1.715 m (5' 7.5\").    Weight as of this encounter: 99.3 kg (219 lb).    Physical Exam  GENERAL: alert and no distress  EYES: Eyes grossly normal to inspection,  and conjunctivae and sclerae normal  HENT: ear canals and TM's normal, nose and mouth without ulcers or lesions  NECK: no adenopathy, no asymmetry, masses, or scars  RESP: lungs clear to auscultation - no rales, rhonchi or wheezes  CV: regular rate and rhythm, normal S1 S2, no S3 or S4, no murmur, click or rub, no peripheral edema  ABDOMEN: soft, nontender, no hepatosplenomegaly, no masses and bowel sounds normal  MS: no gross musculoskeletal defects noted, no edema  SKIN: no suspicious lesions or rashes  NEURO: Normal strength and tone, mentation intact and speech normal  PSYCH: mentation appears normal, affect normal/bright         7/10/2025   Mini Cog   Clock Draw Score 2 Normal   3 Item Recall 3 objects recalled   Mini Cog Total Score 5              Signed Electronically by: KOBI Hooks CNP    Answers submitted by the patient for this visit:  Patient Health Questionnaire (Submitted on 7/9/2025)  PHQ9 TOTAL SCORE: 0  Patient Health Questionnaire (G7) (Submitted on 7/9/2025)  AVINASH 7 TOTAL SCORE: 0    "

## 2025-07-10 NOTE — NURSING NOTE
Prior to immunization administration, verified patients identity using patient s name and date of birth. Please see Immunization Activity for additional information.     Screening Questionnaire for Adult Immunization    Are you sick today?   No   Do you have allergies to medications, food, a vaccine component or latex?   No   Have you ever had a serious reaction after receiving a vaccination?   No   Do you have a long-term health problem with heart, lung, kidney, or metabolic disease (e.g., diabetes), asthma, a blood disorder, no spleen, complement component deficiency, a cochlear implant, or a spinal fluid leak?  Are you on long-term aspirin therapy?   No   Do you have cancer, leukemia, HIV/AIDS, or any other immune system problem?   No   Do you have a parent, brother, or sister with an immune system problem?   No   In the past 3 months, have you taken medications that affect  your immune system, such as prednisone, other steroids, or anticancer drugs; drugs for the treatment of rheumatoid arthritis, Crohn s disease, or psoriasis; or have you had radiation treatments?   No   Have you had a seizure, or a brain or other nervous system problem?   No   During the past year, have you received a transfusion of blood or blood    products, or been given immune (gamma) globulin or antiviral drug?   No   For women: Are you pregnant or is there a chance you could become       pregnant during the next month?   No   Have you received any vaccinations in the past 4 weeks?   No     Immunization questionnaire answers were all negative.      Patient instructed to remain in clinic for 15 minutes afterwards, and to report any adverse reactions.     Screening performed by Luzmaria Fang LPN on 7/10/2025 at 9:27 AM.

## 2025-07-15 LAB
BKR AP ASSOCIATED HPV REPORT: NORMAL
BKR LAB AP GYN ADEQUACY: NORMAL
BKR LAB AP GYN INTERPRETATION: NORMAL
BKR LAB AP PREVIOUS ABNORMAL: NORMAL
PATH REPORT.COMMENTS IMP SPEC: NORMAL
PATH REPORT.COMMENTS IMP SPEC: NORMAL
PATH REPORT.RELEVANT HX SPEC: NORMAL

## 2025-07-29 ENCOUNTER — HOSPITAL ENCOUNTER (OUTPATIENT)
Dept: CARDIOLOGY | Facility: CLINIC | Age: 70
Discharge: HOME | End: 2025-07-29
Attending: PHYSICIAN ASSISTANT
Payer: COMMERCIAL

## 2025-07-29 DIAGNOSIS — Z95.2 S/P TAVR (TRANSCATHETER AORTIC VALVE REPLACEMENT): ICD-10-CM

## 2025-07-29 LAB — LVEF ECHO: NORMAL

## 2025-07-29 PROCEDURE — 93306 TTE W/DOPPLER COMPLETE: CPT | Mod: 26 | Performed by: INTERNAL MEDICINE

## 2025-07-29 PROCEDURE — 93306 TTE W/DOPPLER COMPLETE: CPT

## 2025-08-12 DIAGNOSIS — I25.10 CORONARY ARTERY DISEASE INVOLVING NATIVE CORONARY ARTERY OF NATIVE HEART WITHOUT ANGINA PECTORIS: ICD-10-CM

## 2025-08-12 RX ORDER — METOPROLOL SUCCINATE 25 MG/1
12.5 TABLET, EXTENDED RELEASE ORAL DAILY
Qty: 45 TABLET | Refills: 3 | OUTPATIENT
Start: 2025-08-12

## 2025-08-12 RX ORDER — ATORVASTATIN CALCIUM 40 MG/1
40 TABLET, FILM COATED ORAL DAILY
Qty: 90 TABLET | Refills: 3 | OUTPATIENT
Start: 2025-08-12

## 2025-08-13 ENCOUNTER — OFFICE VISIT (OUTPATIENT)
Dept: CARDIOLOGY | Facility: CLINIC | Age: 70
End: 2025-08-13
Payer: COMMERCIAL

## 2025-08-13 ENCOUNTER — LAB (OUTPATIENT)
Dept: LAB | Facility: CLINIC | Age: 70
End: 2025-08-13
Payer: COMMERCIAL

## 2025-08-13 VITALS
BODY MASS INDEX: 32.83 KG/M2 | SYSTOLIC BLOOD PRESSURE: 122 MMHG | DIASTOLIC BLOOD PRESSURE: 70 MMHG | HEIGHT: 68 IN | WEIGHT: 216.6 LBS | HEART RATE: 80 BPM

## 2025-08-13 DIAGNOSIS — Z95.2 S/P TAVR (TRANSCATHETER AORTIC VALVE REPLACEMENT): ICD-10-CM

## 2025-08-13 DIAGNOSIS — I10 ESSENTIAL HYPERTENSION, BENIGN: ICD-10-CM

## 2025-08-13 DIAGNOSIS — E78.00 HYPERCHOLESTEREMIA: ICD-10-CM

## 2025-08-13 DIAGNOSIS — I25.10 CORONARY ARTERY DISEASE INVOLVING NATIVE CORONARY ARTERY OF NATIVE HEART WITHOUT ANGINA PECTORIS: Primary | ICD-10-CM

## 2025-08-13 LAB
ANION GAP SERPL CALCULATED.3IONS-SCNC: 9 MMOL/L (ref 7–15)
BUN SERPL-MCNC: 15.4 MG/DL (ref 8–23)
CALCIUM SERPL-MCNC: 9.8 MG/DL (ref 8.8–10.4)
CHLORIDE SERPL-SCNC: 104 MMOL/L (ref 98–107)
CREAT SERPL-MCNC: 0.86 MG/DL (ref 0.51–0.95)
EGFRCR SERPLBLD CKD-EPI 2021: 73 ML/MIN/1.73M2
GLUCOSE SERPL-MCNC: 97 MG/DL (ref 70–99)
HCO3 SERPL-SCNC: 27 MMOL/L (ref 22–29)
POTASSIUM SERPL-SCNC: 4.1 MMOL/L (ref 3.4–5.3)
SODIUM SERPL-SCNC: 140 MMOL/L (ref 135–145)

## 2025-08-13 PROCEDURE — 80048 BASIC METABOLIC PNL TOTAL CA: CPT | Performed by: PHYSICIAN ASSISTANT

## 2025-08-13 PROCEDURE — 99214 OFFICE O/P EST MOD 30 MIN: CPT | Performed by: PHYSICIAN ASSISTANT

## 2025-08-13 PROCEDURE — 3078F DIAST BP <80 MM HG: CPT | Performed by: PHYSICIAN ASSISTANT

## 2025-08-13 PROCEDURE — 3074F SYST BP LT 130 MM HG: CPT | Performed by: PHYSICIAN ASSISTANT

## 2025-08-13 PROCEDURE — G2211 COMPLEX E/M VISIT ADD ON: HCPCS | Performed by: PHYSICIAN ASSISTANT

## 2025-08-13 PROCEDURE — 36415 COLL VENOUS BLD VENIPUNCTURE: CPT | Performed by: PHYSICIAN ASSISTANT

## 2025-08-13 RX ORDER — CHLORAL HYDRATE 500 MG
2 CAPSULE ORAL DAILY
COMMUNITY

## (undated) DEVICE — CATH ANGIO 6FR AR MOD THRU   L

## (undated) DEVICE — CATH SYSTEM SENTINEL CEREBRAL PROTECTION 6FR CMS15-10C-US

## (undated) DEVICE — SLEEVE TR BAND RADIAL COMPRESSION DEVICE 24CM TRB24-REG

## (undated) DEVICE — RAD G/W INQWIRE .035X260CM J-TIP EXCHANGE IQ35F260J1O5RS

## (undated) DEVICE — WIRE GUIDE 0.035"X260CM SAFE-T-J EXCHANGE G00517

## (undated) DEVICE — 14FR EDWARDS ESHEATH+ INTRODUCER SET

## (undated) DEVICE — MANIFOLD KIT ANGIO AUTOMATED 014613

## (undated) DEVICE — CATH ANGIO SLCT 6FR DIA 100CML

## (undated) DEVICE — KIT HAND CONTROL ANGIOTOUCH ACIST 65CM AT-P65

## (undated) DEVICE — CATH ANGIO SUPERTORQUE AL1 6FRX100CM 532-645

## (undated) DEVICE — GUIDEWIRE VASC 300CM .014IN CHC PT I H7491215501J2

## (undated) DEVICE — DEFIB PRO-PADZ LVP LQD GEL ADULT 8900-2105-01

## (undated) DEVICE — WIRE GUIDE 0.035"X150CM EMERALD STR 502542

## (undated) DEVICE — CATH ANGIO INFINITI MPA2 4FRX100CM 2 SH 538442

## (undated) DEVICE — INTRO TERUMO 7FRX25CM W/MARKER RSB703

## (undated) DEVICE — Device

## (undated) DEVICE — CATH EP PACEL 5FRX110CM 1MM RIGHT HEART CURVE 401763

## (undated) DEVICE — GUIDEWIRE VASC SAFARI2 0.035X275CM H74939406XS1

## (undated) DEVICE — TOTE ANGIO CORP PC15AT SAN32CC83O

## (undated) DEVICE — CATH ANGIO JUDKINS JL4 6FRX100CM INFINITI 534620T

## (undated) DEVICE — GLIDEWIRE TERUMO .035X180CM 1.5,, J-TIP GR3525

## (undated) DEVICE — CABLE PCNG 12FT REMINGTON PACI

## (undated) DEVICE — NDL PERC ENTRY THINWALL 18GA 7.0" G00166

## (undated) DEVICE — CATH ANGIO JUDKINS R4 6FRX100CM INFINITI 534621T

## (undated) DEVICE — CATH ANGIO INFINITI JR4 4FRX100CM 538421

## (undated) DEVICE — CATH ANGIO INFINITI PIGTAIL 145 6 SH 6FRX110CM  534-652S

## (undated) DEVICE — INTRO GLIDESHEATH SLENDER 6FR 10X45CM 60-1060

## (undated) DEVICE — KIT HAND CONTROL ACIST 016795

## (undated) DEVICE — LINE MONITOR NASAL SMART CAPNOLINE ADULT LONG 12463

## (undated) DEVICE — SYR ANGIOGRAPHY MULTIUSE KIT ACIST 014612

## (undated) DEVICE — INTRO TERUMO 6FRX25CM W/MARKER RSB603

## (undated) DEVICE — RAD CLOSURE ANGIOSEAL 8FR  610131

## (undated) DEVICE — INFLATION DEVICE ATRION QL2530

## (undated) DEVICE — KIT ENDO TURNOVER/PROCEDURE W/CLEAN A SCOPE LINERS 103888

## (undated) DEVICE — GUIDEWIRE VASC 0.014INX190CM J TIP CGRXT190HJ

## (undated) DEVICE — INTRODUCER CATH VASC 5FRX10CM  MPIS-501-NT-U-SST

## (undated) DEVICE — CATH LAUNCHER 6FR EBU 3.5 LA6EBU35

## (undated) DEVICE — INFL DVC KIT W/10CC NITRO IN4530

## (undated) DEVICE — WIRE GUIDE 0.035"X260CM AMPTLAZ XSTIFF CVD THSCF-35-260-3-A

## (undated) DEVICE — CATH JACKY 5FR 3.5 CURVE 40-5023

## (undated) RX ORDER — HEPARIN SODIUM 200 [USP'U]/100ML
INJECTION, SOLUTION INTRAVENOUS
Status: DISPENSED
Start: 2022-05-09

## (undated) RX ORDER — HEPARIN SODIUM 1000 [USP'U]/ML
INJECTION, SOLUTION INTRAVENOUS; SUBCUTANEOUS
Status: DISPENSED
Start: 2022-05-09

## (undated) RX ORDER — LIDOCAINE HYDROCHLORIDE 10 MG/ML
INJECTION, SOLUTION EPIDURAL; INFILTRATION; INTRACAUDAL; PERINEURAL
Status: DISPENSED
Start: 2022-05-09

## (undated) RX ORDER — LIDOCAINE HYDROCHLORIDE 10 MG/ML
INJECTION, SOLUTION EPIDURAL; INFILTRATION; INTRACAUDAL; PERINEURAL
Status: DISPENSED
Start: 2022-05-31

## (undated) RX ORDER — SIMETHICONE 40MG/0.6ML
SUSPENSION, DROPS(FINAL DOSAGE FORM)(ML) ORAL
Status: DISPENSED
Start: 2023-11-06

## (undated) RX ORDER — HEPARIN SODIUM 1000 [USP'U]/ML
INJECTION, SOLUTION INTRAVENOUS; SUBCUTANEOUS
Status: DISPENSED
Start: 2022-05-31

## (undated) RX ORDER — NITROGLYCERIN 5 MG/ML
VIAL (ML) INTRAVENOUS
Status: DISPENSED
Start: 2022-05-31

## (undated) RX ORDER — FENTANYL CITRATE 50 UG/ML
INJECTION, SOLUTION INTRAMUSCULAR; INTRAVENOUS
Status: DISPENSED
Start: 2022-05-09

## (undated) RX ORDER — POTASSIUM CHLORIDE 1500 MG/1
TABLET, EXTENDED RELEASE ORAL
Status: DISPENSED
Start: 2022-05-09

## (undated) RX ORDER — CLOPIDOGREL 300 MG/1
TABLET, FILM COATED ORAL
Status: DISPENSED
Start: 2019-03-11

## (undated) RX ORDER — NITROGLYCERIN 5 MG/ML
VIAL (ML) INTRAVENOUS
Status: DISPENSED
Start: 2022-05-09

## (undated) RX ORDER — LIDOCAINE HYDROCHLORIDE 10 MG/ML
INJECTION, SOLUTION EPIDURAL; INFILTRATION; INTRACAUDAL; PERINEURAL
Status: DISPENSED
Start: 2019-03-11

## (undated) RX ORDER — NITROGLYCERIN 5 MG/ML
VIAL (ML) INTRAVENOUS
Status: DISPENSED
Start: 2019-03-11

## (undated) RX ORDER — VERAPAMIL HYDROCHLORIDE 2.5 MG/ML
INJECTION, SOLUTION INTRAVENOUS
Status: DISPENSED
Start: 2022-05-31

## (undated) RX ORDER — VERAPAMIL HYDROCHLORIDE 2.5 MG/ML
INJECTION, SOLUTION INTRAVENOUS
Status: DISPENSED
Start: 2019-03-11

## (undated) RX ORDER — CEFAZOLIN SODIUM/WATER 2 G/20 ML
SYRINGE (ML) INTRAVENOUS
Status: DISPENSED
Start: 2022-05-31

## (undated) RX ORDER — FENTANYL CITRATE 50 UG/ML
INJECTION, SOLUTION INTRAMUSCULAR; INTRAVENOUS
Status: DISPENSED
Start: 2023-11-06

## (undated) RX ORDER — VERAPAMIL HYDROCHLORIDE 2.5 MG/ML
INJECTION, SOLUTION INTRAVENOUS
Status: DISPENSED
Start: 2022-05-09

## (undated) RX ORDER — DEXMEDETOMIDINE HYDROCHLORIDE 4 UG/ML
INJECTION, SOLUTION INTRAVENOUS
Status: DISPENSED
Start: 2022-05-31

## (undated) RX ORDER — FENTANYL CITRATE 50 UG/ML
INJECTION, SOLUTION INTRAMUSCULAR; INTRAVENOUS
Status: DISPENSED
Start: 2019-03-11

## (undated) RX ORDER — POTASSIUM CHLORIDE 1500 MG/1
TABLET, EXTENDED RELEASE ORAL
Status: DISPENSED
Start: 2019-03-11

## (undated) RX ORDER — HEPARIN SODIUM 1000 [USP'U]/ML
INJECTION, SOLUTION INTRAVENOUS; SUBCUTANEOUS
Status: DISPENSED
Start: 2019-03-11